# Patient Record
Sex: FEMALE | Race: WHITE | NOT HISPANIC OR LATINO | Employment: FULL TIME | URBAN - METROPOLITAN AREA
[De-identification: names, ages, dates, MRNs, and addresses within clinical notes are randomized per-mention and may not be internally consistent; named-entity substitution may affect disease eponyms.]

---

## 2017-01-27 ENCOUNTER — TELEPHONE (OUTPATIENT)
Dept: FAMILY MEDICINE | Facility: CLINIC | Age: 57
End: 2017-01-27

## 2017-01-27 DIAGNOSIS — F32.A DEPRESSION: Primary | ICD-10-CM

## 2017-01-27 NOTE — TELEPHONE ENCOUNTER
Patient called and states she is depressed and crying on phone.  States she will not harm herself and has no thought of suicide.  She would like rx for depression.  In the past she had celexa and it did not agree with her.  Talked with Dr. Cornell and she asked for a PHQ-9 and then start sertraline 50 mg and take one half tab po for one week and if doing ok increase to one tab daily and needs appointment to follow up.  Appt. Scheduled 2/3/17.  PHQ-9 score= 18

## 2017-01-28 ASSESSMENT — PATIENT HEALTH QUESTIONNAIRE - PHQ9: SUM OF ALL RESPONSES TO PHQ QUESTIONS 1-9: 18

## 2017-01-30 ENCOUNTER — TELEPHONE (OUTPATIENT)
Dept: FAMILY MEDICINE | Facility: CLINIC | Age: 57
End: 2017-01-30

## 2017-01-30 NOTE — TELEPHONE ENCOUNTER
"Patient calls reporting started sertraline 25mg 1/27. Felt poorly all day 1/28. Took again 1/28 pm and 1/29 pm. Today feeling poor - \"very hung over feeling\", headache, nausea, diarrhea. Requests alternative rx that is more mild. Hx body very sensitive to meds.   Sertraline was rx'd 1/27 via patient's phone request for med to help with depression symptoms and follow up visit scheduled for 2/3/17.   Plan: patient will RTC Wednesday 2/1 and stop med in meantime. She will call us sooner if any questions.  Alicia Brennan RN      "

## 2017-02-01 ENCOUNTER — OFFICE VISIT (OUTPATIENT)
Dept: FAMILY MEDICINE | Facility: CLINIC | Age: 57
End: 2017-02-01

## 2017-02-01 VITALS
DIASTOLIC BLOOD PRESSURE: 82 MMHG | WEIGHT: 293 LBS | HEART RATE: 52 BPM | TEMPERATURE: 98 F | SYSTOLIC BLOOD PRESSURE: 128 MMHG | OXYGEN SATURATION: 98 % | BODY MASS INDEX: 56.36 KG/M2

## 2017-02-01 DIAGNOSIS — E66.01 MORBID OBESITY DUE TO EXCESS CALORIES (H): ICD-10-CM

## 2017-02-01 DIAGNOSIS — R53.83 FATIGUE, UNSPECIFIED TYPE: ICD-10-CM

## 2017-02-01 DIAGNOSIS — R06.83 SNORING: ICD-10-CM

## 2017-02-01 DIAGNOSIS — H91.91 HEARING LOSS, RIGHT: ICD-10-CM

## 2017-02-01 DIAGNOSIS — F33.0 MAJOR DEPRESSIVE DISORDER, RECURRENT EPISODE, MILD (H): Primary | ICD-10-CM

## 2017-02-01 PROCEDURE — 36415 COLL VENOUS BLD VENIPUNCTURE: CPT | Performed by: FAMILY MEDICINE

## 2017-02-01 PROCEDURE — 99214 OFFICE O/P EST MOD 30 MIN: CPT | Performed by: FAMILY MEDICINE

## 2017-02-01 RX ORDER — DESVENLAFAXINE 50 MG/1
50 TABLET, FILM COATED, EXTENDED RELEASE ORAL DAILY
Qty: 30 TABLET | Refills: 1 | Status: SHIPPED | OUTPATIENT
Start: 2017-02-01 | End: 2017-03-23

## 2017-02-01 NOTE — PROGRESS NOTES
"Problem(s) Oriented visit        SUBJECTIVE:                                                    Donna Elmore is a 56 year old female who presents to clinic today for complaint of poor moods and exhaustion. She will commonly be awake for an hour in the middle of the night. She wakes up about 3-4 times nightly and gets up to urinate. She denies having trouble with excessive urination during the daytime hours. She does snore loudly. She is uncertain if this wakes her from sleep. Apparently her  is not aware of her having any apneic spells. She had a sleep study many years ago which was apparently normal. She tried taking sertraline and was \"completely zombie-like.\" She had diarrhea with starting the sertraline, which she stopped after only a few doses. She complains of being \"sore all over.\" She has a lot of stress with her mother who is being placed in a care facility, but has not yet made the transition. She gets support from her sister, but admits that she could benefit from speaking to a counselor.    She is concerned aout her risk for diabetes with her weight and family history, wondering how to test for this.      Problem list, Medication list, Allergies, and Medical/Social/Surgical histories reviewed in [facility] and updated as appropriate.   Additional history: as documented    ROS:  5 point ROS completed and negative except noted above, including Gen, CV, Resp, GI, MS      Histories:   Patient Active Problem List   Diagnosis     Uterine cancer (H)     SVT (supraventricular tachycardia) (H)     Major depressive disorder, recurrent episode, mild (H)     Appendicitis     Osteoarthritis of left knee     HTN (hypertension)     Obese     Past Surgical History   Procedure Laterality Date     Hysterectomy radical  3-2010     Catheter, ablation  1-2010     tachycardia ablation     D & c  3-2010     ------------other-------------  7-2011     cyst removal - back     Laparoscopic appendectomy  9/27/2013     " "Procedure: LAPAROSCOPIC APPENDECTOMY;  LAPAROSCOPIC APPENDECTOMY;  Surgeon: Noreen Brambila MD;  Location:  OR       Social History   Substance Use Topics     Smoking status: Never Smoker      Smokeless tobacco: Never Used     Alcohol Use: No     Family History   Problem Relation Age of Onset     HEART DISEASE Mother      pacemaker, heart failure     Thyroid Disease Mother      Hypertension Mother      CANCER Father      kidney     HEART DISEASE Father      Hypertension Father      Thyroid Disease Sister            OBJECTIVE:                                                    /82 mmHg  Pulse 52  Temp(Src) 98  F (36.7  C) (Oral)  Wt 158.305 kg (349 lb)  SpO2 98%  Body mass index is 56.36 kg/(m^2).   APPEARANCE: = Relaxed and in no distress  APPEARANCE: = morbidly obese  Recent/Remote Memory = Alert and Oriented x 3  Mood/Affect = Cooperative and interested   Labs Resulted Today:   Results for orders placed or performed in visit on 09/13/16   Giardia EIA O and P ([facility])   Result Value Ref Range    Ova + Parasite Exam Final report     Result 1 Comment     Giardia lamblia Ag, EIA Negative Negative    Narrative    Performed at:  [address] [place] [address], [place], [place]  [id number]  : GARRETT Mccarthy MD, Phone:  [phone]   Stool Culture ([employer])   Result Value Ref Range    Salmonella/Shigella Screen Final report     Result 1 Comment     Campylobacter Culture Final report     Result 1 Comment     E. Coli Shiga Toxin EIA Stool Negative Negative    Narrative    Performed at:  [facility] [address], [place], [place]  [id number]  : Paddy Mckeon MD, Phone:  [phone]     ASSESSMENT/PLAN:                                                    BMI:   Estimated body mass index is 56.36 kg/(m^2) as calculated from the following:    Height as of 11/5/15: 1.676 m (5' 6\").    Weight as of this encounter: 158.305 kg (349 lb).   Weight management plan: Patient referred to " endocrine and/or weight management specialty      Donna was seen today for depression and recheck medication.    Diagnoses and all orders for this visit:    Major depressive disorder, recurrent episode, mild (H)  -     desvenlafaxine succinate ER (PRISTIQ) 50 MG 24 hr tablet; Take 1 tablet (50 mg) by mouth daily  Poor tolerance of other SSRIs. Trial of Pristiq. F/U in 4 weeks. Names of counselors given.    Fatigue, unspecified type  Likely due to poor sleep. Recommend sleep study.     Morbid obesity due to excess calories (H)  -     Hemoglobin A1C ([facility])  -     VENOUS COLLECTION  -     Referral to [facility] To learn more about gastric bypass surgery.    Snoring  -     Sleep Study Referral; Future    Hearing loss, right  Refer for audiology evaluation.    35 minutes face to face time spent with patient, >50% in counseling.  There are no Patient Instructions on file for this visit.    The following health maintenance items are reviewed in Epic and correct as of today:  Health Maintenance   Topic Date Due     ADVANCE DIRECTIVE PLANNING Q5 YRS (NO INBASKET)  03/17/1979     HEPATITIS C SCREENING  03/17/1979     PAP SCREENING Q3 YR (SYSTEM ASSIGNED)  03/17/1982     COLON CANCER SCREEN (SYSTEM ASSIGNED)  03/17/2011     MAMMO SCREEN Q2 YR (SYSTEM ASSIGNED)  03/28/2013     INFLUENZA VACCINE (SYSTEM ASSIGNED)  09/01/2016     PHQ-9 Q6 MONTHS (NO INBASKET)  07/27/2017     TETANUS IMMUNIZATION (SYSTEM ASSIGNED)  03/07/2018     LIPID SCREEN Q5 YR FEMALE (SYSTEM ASSIGNED)  11/05/2020       Verito Cornell MD  [facility]  Family [facility]  [phone]    For any issues my office # is [phone]

## 2017-02-01 NOTE — MR AVS SNAPSHOT
After Visit Summary   2/1/2017    Donna Elmore    MRN: [id number]           Patient Information     Date Of Birth          1960        Visit Information        Provider Department      2/1/2017 4:15 PM Verito Cornell MD [facility]        Today's Diagnoses     Major depressive disorder, recurrent episode, mild (H)    -  1     Fatigue, unspecified type         Morbid obesity due to excess calories (H)         Snoring         Hearing loss, right            Follow-ups after your visit        Additional Services     Referral to [facility]       Referral to [facility]  Phone:  [phone]  Reason for Referral:  Bariatric surgery     Please be aware that coverage of these services is subject to the terms and limitations of your health insurance plan.  Call member services at your health plan with any benefit or coverage questions.            Sleep Study Referral       Please be aware that coverage of these services is subject to the terms and limitations of your health insurance plan.  Call member services at your health plan with any benefit or coverage questions.      Please bring the following to your appointment:    >>   List of current medications   >>   This referral request   >>   Any documents/labs given to you for this referral    [facility]  Ph [phone] (Age 15 and up)                  Future tests that were ordered for you today     Open Future Orders        Priority Expected Expires Ordered    Sleep Study Referral Routine  2/1/2018 2/1/2017            Who to contact     If you have questions or need follow up information about today's clinic visit or your schedule please contact [facility] directly at [phone].  Normal or non-critical lab and imaging results will be communicated to you by MyChart, letter or phone within 4 business days after the clinic has received the results. If you do  "not hear from us within 7 days, please contact the clinic through [employer] or phone. If you have a critical or abnormal lab result, we will notify you by phone as soon as possible.  Submit refill requests through [employer]bo or call your pharmacy and they will forward the refill request to us. Please allow 3 business days for your refill to be completed.          Additional Information About Your Visit        [employer] Information     Drobo lets you send messages to your doctor, view your test results, renew your prescriptions, schedule appointments and more. To sign up, go to [email or web address] . Click on \"Log in\" on the left side of the screen, which will take you to the Welcome page. Then click on \"Sign up Now\" on the right side of the page.     You will be asked to enter the access code listed below, as well as some personal information. Please follow the directions to create your username and password.     Your access code is: [email or web address]  Expires: 2017  5:26 PM     Your access code will  in 90 days. If you need help or a new code, please call your [facility] clinic or [phone].        Care EveryWhere ID     This is your Care EveryWhere ID. This could be used by other organizations to access your [facility] medical records  [id number]        Your Vitals Were     Pulse Temperature Pulse Oximetry             52 98  F (36.7  C) (Oral) 98%          Blood Pressure from Last 3 Encounters:   17 128/82   16 122/80   16 122/78    Weight from Last 3 Encounters:   17 158.305 kg (349 lb)   16 158.305 kg (349 lb)   16 163.749 kg (361 lb)              We Performed the Following     Hemoglobin A1C ([facility])     Referral to [facility]     VENOUS COLLECTION          Today's Medication Changes          These changes are accurate as of: 17  5:26 PM.  If you have any questions, ask your nurse or doctor.               Start taking these medicines.        " Dose/Directions    desvenlafaxine succinate ER 50 MG 24 hr tablet   Commonly known as:  PRISTIQ   Used for:  Major depressive disorder, recurrent episode, mild (H)   Started by:  Verito Cornell MD        Dose:  50 mg   Take 1 tablet (50 mg) by mouth daily   Quantity:  30 tablet   Refills:  1         Stop taking these medicines if you haven't already. Please contact your care team if you have questions.     sertraline 50 MG tablet   Commonly known as:  ZOLOFT   Stopped by:  Verito Cornell MD                Where to get your medicines      These medications were sent to [facility] [address] [facility] [address] AT [facility]  [address], [place] [address]     Phone:  [phone]    - desvenlafaxine succinate ER 50 MG 24 hr tablet             Primary Care Provider Office Phone # Fax #    Verito Cornell [phone]       [facility] [address]  [facility] [address]        Thank you!     Thank you for choosing [facility]  for your care. Our goal is always to provide you with excellent care. Hearing back from our patients is one way we can continue to improve our services. Please take a few minutes to complete the written survey that you may receive in the mail after your visit with us. Thank you!             Your Updated Medication List - Protect others around you: Learn how to safely use, store and throw away your medicines at www.disposemymeds.org.          This list is accurate as of: 2/1/17  5:26 PM.  Always use your most recent med list.                   Brand Name Dispense Instructions for use    ADVIL 200 MG tablet   Generic drug:  ibuprofen      Take 200 mg by mouth every 4 hours as needed for mild pain       ascorbic acid 1000 MG Tabs    vitamin C     Take 1,000 mg by mouth daily       desvenlafaxine succinate ER 50 MG 24 hr tablet    PRISTIQ    30 tablet    Take 1 tablet (50  mg) by mouth daily       glucosamine-chondroitin 500-400 MG Caps per capsule      Take 1 capsule by mouth 2 times daily       metoprolol 100 MG 24 hr tablet    TOPROL-XL    90 tablet    TAKE 1 TABLET BY MOUTH EVERY DAY       TUMS PO          VITAMIN D (CHOLECALCIFEROL) PO      Take 5,000 Units by mouth daily 1000units with Vitamin C 500mg - 3 tabs am & 2 tabs pm

## 2017-02-02 LAB — HBA1C MFR BLD: 6.5 % (ref 4.8–5.6)

## 2017-02-03 ENCOUNTER — TELEPHONE (OUTPATIENT)
Dept: FAMILY MEDICINE | Facility: CLINIC | Age: 57
End: 2017-02-03

## 2017-02-03 DIAGNOSIS — R73.09 OTHER ABNORMAL GLUCOSE: Primary | ICD-10-CM

## 2017-02-03 NOTE — TELEPHONE ENCOUNTER
You do indeed have mild diabetes.  There is a good chance that this can be diet controlled. Please make an appointment to meet with Linda our pharmacist, who will do diabetic teaching. I will see you back in 3-4 months to recheck hemoglobin A1c.  Patient informed & agrees & has scheduled with Linda  Future order made for A1C in 4 mo  Danielle Skinner MA February 3, 2017 3:02 PM

## 2017-02-03 NOTE — Clinical Note
[facility] [address] [place]  [address]  Phone: [phone]    February 3, 2017      Donna Elmore  [address]  [facility] [address]              Dear Donna,    The results from your recent visit showed You do indeed have mild diabetes.      There is a good chance that this can be diet controlled.   Please make an appointment to meet with Linda our pharmacist, who will do diabetic teaching.     I will see you back in 3-4 months to recheck hemoglobin A1c.        Sincerely,     Verito Cornell M.D.    Results for orders placed or performed in visit on 02/01/17   Hemoglobin A1C ([facility])   Result Value Ref Range    Hemoglobin A1C 6.5 (H) 4.8 - 5.6 %    Narrative    Performed at:  01 - [facility] [address], [place], [place]  [id number]  : Paddy Mckeon MD, Phone:  [phone]

## 2017-02-04 ASSESSMENT — PATIENT HEALTH QUESTIONNAIRE - PHQ9: SUM OF ALL RESPONSES TO PHQ QUESTIONS 1-9: 17

## 2017-02-20 ENCOUNTER — TELEPHONE (OUTPATIENT)
Dept: FAMILY MEDICINE | Facility: CLINIC | Age: 57
End: 2017-02-20

## 2017-03-23 ENCOUNTER — OFFICE VISIT (OUTPATIENT)
Dept: FAMILY MEDICINE | Facility: CLINIC | Age: 57
End: 2017-03-23

## 2017-03-23 VITALS
HEART RATE: 66 BPM | BODY MASS INDEX: 57.14 KG/M2 | SYSTOLIC BLOOD PRESSURE: 142 MMHG | TEMPERATURE: 98.6 F | DIASTOLIC BLOOD PRESSURE: 90 MMHG | OXYGEN SATURATION: 98 % | WEIGHT: 293 LBS

## 2017-03-23 DIAGNOSIS — L72.3 SEBACEOUS CYST: Primary | ICD-10-CM

## 2017-03-23 PROCEDURE — 99213 OFFICE O/P EST LOW 20 MIN: CPT | Performed by: FAMILY MEDICINE

## 2017-03-23 RX ORDER — SULFAMETHOXAZOLE/TRIMETHOPRIM 800-160 MG
1 TABLET ORAL 2 TIMES DAILY
Qty: 14 TABLET | Refills: 0 | Status: SHIPPED | OUTPATIENT
Start: 2017-03-23 | End: 2017-09-08

## 2017-03-23 NOTE — MR AVS SNAPSHOT
"              After Visit Summary   3/23/2017    oDnna Elmore    MRN: [id number]           Patient Information     Date Of Birth          1960        Visit Information        Provider Department      3/23/2017 2:15 PM Verito Cornell MD [facility]        Today's Diagnoses     Sebaceous cyst    -  1       Follow-ups after your visit        Your next 10 appointments already scheduled     Apr 07, 2017  3:00 PM CDT   Office Visit with Verito Cornell MD, RF PROCEDURE ROOM   [facility]    [address] [place] [address] [phone]           Bring a current list of meds and any records pertaining to this visit.  For Physicals, please bring immunization records and any forms needing to be filled out.  Please arrive 10 minutes early to complete paperwork.              Who to contact     If you have questions or need follow up information about today's clinic visit or your schedule please contact [facility] directly at [phone].  Normal or non-critical lab and imaging results will be communicated to you by AiMeiWe[employer]art, letter or phone within 4 business days after the clinic has received the results. If you do not hear from us within 7 days, please contact the clinic through [employer] or phone. If you have a critical or abnormal lab result, we will notify you by phone as soon as possible.  Submit refill requests through [employer] or call your pharmacy and they will forward the refill request to us. Please allow 3 business days for your refill to be completed.          Additional Information About Your Visit        Zero2IPO Information     Zero2[employer]O lets you send messages to your doctor, view your test results, renew your prescriptions, schedule appointments and more. To sign up, go to [email or web address] . Click on \"Log in\" on the left side of the screen, which will take you to the Welcome page. Then click on \"Sign up Now\" " on the right side of the page.     You will be asked to enter the access code listed below, as well as some personal information. Please follow the directions to create your username and password.     Your access code is: [email or web address]  Expires: 2017  6:26 PM     Your access code will  in 90 days. If you need help or a new code, please call your [facility] or [phone].        Care EveryWhere ID     This is your Care EveryWhere ID. This could be used by other organizations to access your [facility] medical records  [id number]        Your Vitals Were     Pulse Temperature Pulse Oximetry BMI (Body Mass Index)          66 98.6  F (37  C) 98% 57.14 kg/m2         Blood Pressure from Last 3 Encounters:   17 142/90   17 128/82   16 122/80    Weight from Last 3 Encounters:   17 (!) 160.6 kg (354 lb)   17 (!) 158.3 kg (349 lb)   16 (!) 158.3 kg (349 lb)              Today, you had the following     No orders found for display         Today's Medication Changes          These changes are accurate as of: 3/23/17 11:59 PM.  If you have any questions, ask your nurse or doctor.               Start taking these medicines.        Dose/Directions    sulfamethoxazole-trimethoprim 800-160 MG per tablet   Commonly known as:  BACTRIM DS/SEPTRA DS   Used for:  Sebaceous cyst   Started by:  Verito Cornell MD        Dose:  1 tablet   Take 1 tablet by mouth 2 times daily   Quantity:  14 tablet   Refills:  0            Where to get your medicines      These medications were sent to [facility] [address] [place], [place] - [address] AT [facility] [place]  [address], [facility] [address]     Phone:  [phone]     sulfamethoxazole-trimethoprim 800-160 MG per tablet                Primary Care Provider Office Phone # Fax #    Verito Cornell [phone]       [place] [address]  [facility]  [address]        Thank you!     Thank you for choosing [facility]  for your care. Our goal is always to provide you with excellent care. Hearing back from our patients is one way we can continue to improve our services. Please take a few minutes to complete the written survey that you may receive in the mail after your visit with us. Thank you!             Your Updated Medication List - Protect others around you: Learn how to safely use, store and throw away your medicines at www.disposemymeds.org.          This list is accurate as of: 3/23/17 11:59 PM.  Always use your most recent med list.                   Brand Name Dispense Instructions for use    ADVIL 200 MG tablet   Generic drug:  ibuprofen      Take 200 mg by mouth every 4 hours as needed for mild pain       ascorbic acid 1000 MG Tabs    vitamin C     Take 1,000 mg by mouth daily       glucosamine-chondroitin 500-400 MG Caps per capsule      Take 1 capsule by mouth 2 times daily       metoprolol 100 MG 24 hr tablet    TOPROL-XL    90 tablet    TAKE 1 TABLET BY MOUTH EVERY DAY       sulfamethoxazole-trimethoprim 800-160 MG per tablet    BACTRIM DS/SEPTRA DS    14 tablet    Take 1 tablet by mouth 2 times daily       TUMS PO          VITAMIN D (CHOLECALCIFEROL) PO      Take 5,000 Units by mouth daily 1000units with Vitamin C 500mg - 3 tabs am & 2 tabs pm

## 2017-03-23 NOTE — PROGRESS NOTES
Problem(s) Oriented visit        SUBJECTIVE:                                                    Donna Elmore is a 57 year old female who presents to clinic today for sore on her back. It has been hurting for the past few days, especially if she leans against it. She has not done any hot-packing or allowed anyone to squeeze it.       Problem list, Medication list, Allergies, and Medical/Social/Surgical histories reviewed in EPIC and updated as appropriate.   Additional history: as documented    ROS:  5 point ROS completed and negative except noted above, including Gen, CV, Resp, GI, MS      Histories:   Patient Active Problem List   Diagnosis     Uterine cancer (H)     SVT (supraventricular tachycardia) (H)     Major depressive disorder, recurrent episode, mild (H)     Appendicitis     Osteoarthritis of left knee     HTN (hypertension)     Obese     Past Surgical History:   Procedure Laterality Date     ------------OTHER-------------  7-2011    cyst removal - back     CATHETER, ABLATION  1-2010    tachycardia ablation     D & C  3-2010     HYSTERECTOMY RADICAL  3-2010     LAPAROSCOPIC APPENDECTOMY  9/27/2013    Procedure: LAPAROSCOPIC APPENDECTOMY;  LAPAROSCOPIC APPENDECTOMY;  Surgeon: Noreen Brambila MD;  Location:  OR       Social History   Substance Use Topics     Smoking status: Never Smoker     Smokeless tobacco: Never Used     Alcohol use No     Family History   Problem Relation Age of Onset     HEART DISEASE Mother      pacemaker, heart failure     Thyroid Disease Mother      Hypertension Mother      CANCER Father      kidney     HEART DISEASE Father      Hypertension Father      Thyroid Disease Sister            OBJECTIVE:                                                    /90 (BP Location: Left arm)  Pulse 66  Temp 98.6  F (37  C)  Wt (!) 160.6 kg (354 lb)  SpO2 98%  BMI 57.14 kg/m2  Body mass index is 57.14 kg/(m^2).   APPEARANCE: = Relaxed and in no distress  SKIN: midline thoracic back  with scar from previous excision and cystic feeling lesion at the same area measuring 2 cm in diameter with mild warmth and mild fluctuance  Recent/Remote Memory = Alert and Oriented x 3  Mood/Affect = Cooperative and interested   Labs Resulted Today:   Results for orders placed or performed in visit on 02/01/17   Hemoglobin A1C ([facility])   Result Value Ref Range    Hemoglobin A1C 6.5 (H) 4.8 - 5.6 %    Narrative    Performed at:  01 - [facility] [address], [place], [place]  [id number]  : Paddy Mckoen MD, Phone:  [phone]     ASSESSMENT/PLAN:                                                        Donna was seen today for derm problem and uri.    Diagnoses and all orders for this visit:    Sebaceous cyst  -     sulfamethoxazole-trimethoprim (BACTRIM DS/SEPTRA DS) 800-160 MG per tablet; Take 1 tablet by mouth 2 times daily  Recommend hot pack, then return for excision when it cools off.    There are no Patient Instructions on file for this visit.    The following health maintenance items are reviewed in Epic and correct as of today:  Health Maintenance   Topic Date Due     ADVANCE DIRECTIVE PLANNING Q5 YRS (NO INBASKET)  03/17/1979     HEPATITIS C SCREENING  03/17/1979     PAP SCREENING Q3 YR (SYSTEM ASSIGNED)  03/17/1982     COLON CANCER SCREEN (SYSTEM ASSIGNED)  03/17/2011     MAMMO SCREEN Q2 YR (SYSTEM ASSIGNED)  03/28/2013     PHQ-9 Q6 MONTHS (NO INBASKET)  08/01/2017     INFLUENZA VACCINE (SYSTEM ASSIGNED)  09/01/2017     TETANUS IMMUNIZATION (SYSTEM ASSIGNED)  03/07/2018     LIPID SCREEN Q5 YR FEMALE (SYSTEM ASSIGNED)  11/05/2020       Verito Cornell MD  [facility]  Family Practice  [facility]  [phone]    For any issues my office # is [phone]

## 2017-03-24 ENCOUNTER — TELEPHONE (OUTPATIENT)
Dept: FAMILY MEDICINE | Facility: CLINIC | Age: 57
End: 2017-03-24

## 2017-03-24 DIAGNOSIS — R05.9 COUGH: Primary | ICD-10-CM

## 2017-03-24 RX ORDER — ALBUTEROL SULFATE 0.83 MG/ML
1 SOLUTION RESPIRATORY (INHALATION) EVERY 6 HOURS PRN
Qty: 25 VIAL | Refills: 0 | Status: SHIPPED | OUTPATIENT
Start: 2017-03-24 | End: 2019-05-24

## 2017-03-24 NOTE — TELEPHONE ENCOUNTER
Patient called requesting refill on albuterol nebulizer.  She denies any SOB or difficulty breathing.  Per Dr. Kraig MONIQUE for refill, patient told that if SOB or difficulty breathing develops that she is to be seen either in clinic or urgent care.  Laura Medina

## 2017-04-04 ENCOUNTER — TELEPHONE (OUTPATIENT)
Dept: FAMILY MEDICINE | Facility: CLINIC | Age: 57
End: 2017-04-04

## 2017-04-04 DIAGNOSIS — B37.31 YEAST INFECTION OF THE VAGINA: Primary | ICD-10-CM

## 2017-04-04 RX ORDER — FLUCONAZOLE 150 MG/1
TABLET ORAL
Qty: 2 TABLET | Refills: 0 | Status: SHIPPED | OUTPATIENT
Start: 2017-04-04 | End: 2017-09-08

## 2017-04-05 NOTE — TELEPHONE ENCOUNTER
Patient calls reporting vaginal itching x 3 days. Denies any other vaginal symptoms. On Septra DS x 10 days prescribed 3/23/17 for pneumonia. Hx yeast infections with antibiotics - resolve with fluconazole.   Plan: ok per Dr. Lynch (oncall for Dr. Cornell) for fluconazole 150mg #2 tabs sig one now and may repeat in 3-5 days prn. RTC if symptoms worsen or persist. Patient agrees.  Alicia Brennan RN

## 2017-04-07 ENCOUNTER — OFFICE VISIT (OUTPATIENT)
Dept: FAMILY MEDICINE | Facility: CLINIC | Age: 57
End: 2017-04-07

## 2017-04-07 VITALS — HEART RATE: 63 BPM | OXYGEN SATURATION: 97 % | DIASTOLIC BLOOD PRESSURE: 62 MMHG | SYSTOLIC BLOOD PRESSURE: 132 MMHG

## 2017-04-07 DIAGNOSIS — J18.9 COMMUNITY ACQUIRED PNEUMONIA: Primary | ICD-10-CM

## 2017-04-07 PROCEDURE — 71020 XR CHEST 2 VW: CPT | Performed by: FAMILY MEDICINE

## 2017-04-07 PROCEDURE — 99213 OFFICE O/P EST LOW 20 MIN: CPT | Performed by: FAMILY MEDICINE

## 2017-04-07 RX ORDER — LEVOFLOXACIN 750 MG/1
750 TABLET, FILM COATED ORAL DAILY
Qty: 7 TABLET | Refills: 0 | Status: SHIPPED | OUTPATIENT
Start: 2017-04-07 | End: 2017-09-08

## 2017-04-07 NOTE — MR AVS SNAPSHOT
"              After Visit Summary   4/7/2017    Donna Elmore    MRN: [id number]           Patient Information     Date Of Birth          1960        Visit Information        Provider Department      4/7/2017 3:15 PM Verito Cornell MD [facility]        Today's Diagnoses     Community acquired pneumonia    -  1       Follow-ups after your visit        Your next 10 appointments already scheduled     Apr 20, 2017  2:00 PM CDT   New Sleep Patient with Miguel Kiran MD   [facility] ([facility])    [address] [place] [address] [id number] [phone]              Who to contact     If you have questions or need follow up information about today's clinic visit or your schedule please contact [facility] directly at [phone].  Normal or non-critical lab and imaging results will be communicated to you by "[employer]hart, letter or phone within 4 business days after the clinic has received the results. If you do not hear from us within 7 days, please contact the clinic through "[employer]hart or phone. If you have a critical or abnormal lab result, we will notify you by phone as soon as possible.  Submit refill requests through [employer] or call your pharmacy and they will forward the refill request to us. Please allow 3 business days for your refill to be completed.          Additional Information About Your Visit        MyChart Information     [employer] lets you send messages to your doctor, view your test results, renew your prescriptions, schedule appointments and more. To sign up, go to [email or web address] . Click on \"Log in\" on the left side of the screen, which will take you to the Welcome page. Then click on \"Sign up Now\" on the right side of the page.     You will be asked to enter the access code listed below, as well as some personal information. Please follow the directions to create your username and password.   "   Your access code is: [email or web address]  Expires: 2017  6:26 PM     Your access code will  in 90 days. If you need help or a new code, please call your [facility] or [phone].        Care EveryWhere ID     This is your Care EveryWhere ID. This could be used by other organizations to access your [place] medical records  [id number]        Your Vitals Were     Pulse Pulse Oximetry                63 97%           Blood Pressure from Last 3 Encounters:   17 132/62   17 142/90   17 128/82    Weight from Last 3 Encounters:   17 (!) 160.6 kg (354 lb)   17 (!) 158.3 kg (349 lb)   16 (!) 158.3 kg (349 lb)              We Performed the Following     X-ray Chest 2 vws*          Today's Medication Changes          These changes are accurate as of: 17  3:47 PM.  If you have any questions, ask your nurse or doctor.               Start taking these medicines.        Dose/Directions    levofloxacin 750 MG tablet   Commonly known as:  LEVAQUIN   Used for:  Community acquired pneumonia   Started by:  Verito Cornell MD        Dose:  750 mg   Take 1 tablet (750 mg) by mouth daily   Quantity:  7 tablet   Refills:  0            Where to get your medicines      These medications were sent to [facility] [id number] [place], [place] [address] AT [facility] [address] [place]  [address], [facility] [address]     Phone:  [phone]     levofloxacin 750 MG tablet                Primary Care Provider Office Phone # Fax #    Verito Cornell [phone]       [facility] [address]  [facility] [address]        Thank you!     Thank you for choosing [facility]  for your care. Our goal is always to provide you with excellent care. Hearing back from our patients is one way we can continue to improve our services. Please take a few minutes to complete the written survey that you may receive in the  mail after your visit with us. Thank you!             Your Updated Medication List - Protect others around you: Learn how to safely use, store and throw away your medicines at www.disposemymeds.org.          This list is accurate as of: 4/7/17  3:47 PM.  Always use your most recent med list.                   Brand Name Dispense Instructions for use    ADVIL 200 MG tablet   Generic drug:  ibuprofen      Take 200 mg by mouth every 4 hours as needed for mild pain       albuterol (2.5 MG/3ML) 0.083% neb solution     25 vial    Take 1 vial (2.5 mg) by nebulization every 6 hours as needed       ascorbic acid 1000 MG Tabs    vitamin C     Take 1,000 mg by mouth daily       fluconazole 150 MG tablet    DIFLUCAN    2 tablet    Take one tab by mouth now and may repeat in 3-5 days if needed.       glucosamine-chondroitin 500-400 MG Caps per capsule      Take 1 capsule by mouth 2 times daily       levofloxacin 750 MG tablet    LEVAQUIN    7 tablet    Take 1 tablet (750 mg) by mouth daily       metoprolol 100 MG 24 hr tablet    TOPROL-XL    90 tablet    TAKE 1 TABLET BY MOUTH EVERY DAY       sulfamethoxazole-trimethoprim 800-160 MG per tablet    BACTRIM DS/SEPTRA DS    14 tablet    Take 1 tablet by mouth 2 times daily       TUMS PO          VITAMIN D (CHOLECALCIFEROL) PO      Take 5,000 Units by mouth daily 1000units with Vitamin C 500mg - 3 tabs am & 2 tabs pm

## 2017-04-07 NOTE — PROGRESS NOTES
Problem(s) Oriented visit        SUBJECTIVE:                                                    Donna Elmore is a 57 year old female who presents to clinic today for cough. She has been on antibiotic for pneumonia for about 8 days. She was seen at Urgent Care in [place] and chest xray was done. She also had influenza and took Tamiflu. She initially felt better but now is feeling the cough worsen, more fatigue, sleeping poorly. No fever. She feels tight in the chest.       Problem list, Medication list, Allergies, and Medical/Social/Surgical histories reviewed in [facility] and updated as appropriate.   Additional history: as documented    ROS:  5 point ROS completed and negative except noted above, including Gen, CV, Resp, GI, MS      Histories:   Patient Active Problem List   Diagnosis     Uterine cancer (H)     SVT (supraventricular tachycardia) (H)     Major depressive disorder, recurrent episode, mild (H)     Appendicitis     Osteoarthritis of left knee     HTN (hypertension)     Obese     Past Surgical History:   Procedure Laterality Date     ------------OTHER-------------  7-2011    cyst removal - back     CATHETER, ABLATION  1-2010    tachycardia ablation     D & C  3-2010     HYSTERECTOMY RADICAL  3-2010     LAPAROSCOPIC APPENDECTOMY  9/27/2013    Procedure: LAPAROSCOPIC APPENDECTOMY;  LAPAROSCOPIC APPENDECTOMY;  Surgeon: Noreen Brambila MD;  Location:  OR       Social History   Substance Use Topics     Smoking status: Never Smoker     Smokeless tobacco: Never Used     Alcohol use No     Family History   Problem Relation Age of Onset     HEART DISEASE Mother      pacemaker, heart failure     Thyroid Disease Mother      Hypertension Mother      CANCER Father      kidney     HEART DISEASE Father      Hypertension Father      Thyroid Disease Sister            OBJECTIVE:                                                    /62  Pulse 63  SpO2 97%  There is no height or weight on file to calculate BMI.    APPEARANCE: = Relaxed and in no distress  Conj/Eyelids = noninjected and lids and lashes are without inflammation  PERRLA/Irises = Pupils Round Reactive to Light and Irisis without inflammation  Ears/Nose = External structures and Nares have usual shape and form  Ear canals and TM's = Canals are not inflammed and have none or little wax and the drums are not injected and have a light reflex   Lips/Teeth/Gums = No lesions seen, good dentition, and gums seem healthy  Oropharynx = No leukoplakia, No injection to the tissues, Normal Uvula  Neck = No anterior or posterior adenopathy appreciated.  Resp effort = Calm regular breathing  Breath Sounds =  Musical rhonchi in the right upper lung field. No appreciable wheeze.  Mood/Affect = Cooperative and interested  Chest xray with might middle lobe infiltrate   Labs Resulted Today:   Results for orders placed or performed in visit on 02/01/17   Hemoglobin A1C ([facility])   Result Value Ref Range    Hemoglobin A1C 6.5 (H) 4.8 - 5.6 %    Narrative    Performed at:  01 - [facility] [address], [place], [place]  [id number]  : Paddy Mckeon MD, Phone:  [phone]     ASSESSMENT/PLAN:                                                        Donna was seen today for other.    Diagnoses and all orders for this visit:    Community acquired pneumonia  -     X-ray Chest 2 vws*  Stop cefdinir, start Levaquin. Follow up if failure to improve.        The following health maintenance items are reviewed in Epic and correct as of today:  Health Maintenance   Topic Date Due     ADVANCE DIRECTIVE PLANNING Q5 YRS (NO INBASKET)  03/17/1979     HEPATITIS C SCREENING  03/17/1979     PAP SCREENING Q3 YR (SYSTEM ASSIGNED)  03/17/1982     COLON CANCER SCREEN (SYSTEM ASSIGNED)  03/17/2011     MAMMO SCREEN Q2 YR (SYSTEM ASSIGNED)  03/28/2013     PHQ-9 Q6 MONTHS (NO INBASKET)  08/01/2017     INFLUENZA VACCINE (SYSTEM ASSIGNED)  09/01/2017     TETANUS IMMUNIZATION (SYSTEM ASSIGNED)   03/07/2018     LIPID SCREEN Q5 YR FEMALE (SYSTEM ASSIGNED)  11/05/2020       Verito Cornell MD  [facility]  [phone]    For any issues my office # is [phone]

## 2017-04-12 ENCOUNTER — TELEPHONE (OUTPATIENT)
Dept: FAMILY MEDICINE | Facility: CLINIC | Age: 57
End: 2017-04-12

## 2017-04-12 NOTE — TELEPHONE ENCOUNTER
----- Message from Verito Cornell MD sent at 4/10/2017  5:18 PM CDT -----  Need to repeat xray in 2-3 weeks to make sure pneumonia went away.

## 2017-04-27 ENCOUNTER — TRANSFERRED RECORDS (OUTPATIENT)
Dept: FAMILY MEDICINE | Facility: CLINIC | Age: 57
End: 2017-04-27

## 2017-04-27 ENCOUNTER — OFFICE VISIT (OUTPATIENT)
Dept: FAMILY MEDICINE | Facility: CLINIC | Age: 57
End: 2017-04-27

## 2017-04-27 VITALS
HEART RATE: 60 BPM | DIASTOLIC BLOOD PRESSURE: 80 MMHG | BODY MASS INDEX: 57.3 KG/M2 | OXYGEN SATURATION: 99 % | WEIGHT: 293 LBS | SYSTOLIC BLOOD PRESSURE: 148 MMHG

## 2017-04-27 DIAGNOSIS — J18.9 PNEUMONIA OF RIGHT MIDDLE LOBE DUE TO INFECTIOUS ORGANISM: Primary | ICD-10-CM

## 2017-04-27 DIAGNOSIS — H91.91 HEARING LOSS, RIGHT: ICD-10-CM

## 2017-04-27 DIAGNOSIS — R91.8 OPACITY OF LUNG ON IMAGING STUDY: ICD-10-CM

## 2017-04-27 PROCEDURE — 71020 XR CHEST 2 VW: CPT | Performed by: FAMILY MEDICINE

## 2017-04-27 PROCEDURE — 99214 OFFICE O/P EST MOD 30 MIN: CPT | Performed by: FAMILY MEDICINE

## 2017-04-27 NOTE — MR AVS SNAPSHOT
After Visit Summary   4/27/2017    Donna Elmore    MRN: [id number]           Patient Information     Date Of Birth          1960        Visit Information        Provider Department      4/27/2017 8:30 AM Verito Cornell MD [facility]        Today's Diagnoses     Pneumonia of right middle lobe due to infectious organism    -  1    Hearing loss, right        Opacity of lung on imaging study           Follow-ups after your visit        Additional Services     Referral to Audiology - [facility]       Your provider has referred you to the audiology department here at [facility].    Treatment:  Evaluation & Treatment   Specialty Testing:  Hearing Aid Consultation    Please be aware that coverage of these services is subject to the terms and limitations of your health insurance plan.  Call member services at your health plan with any benefit or coverage questions.      Please bring the following to your appointment:  >>   Any x-rays, CTs or MRIs which have been performed.  Contact the facility where they were done to arrange for  prior to your scheduled appointment.  Any new CT, MRI or other procedures ordered by your specialist must be performed at a [facility] facility or coordinated by your clinic's   referral office.   >>   List of current medications  >>   This referral request   >>   Any documents/labs given to you for this referral            [facility]       [facility]  Phone: [phone]  Fax: [phone]  Reason for referral: CT CHEST WITH CONTRAST                  Your next 10 appointments already scheduled     May 26, 2017  8:00 AM CDT   New Sleep Patient with Miguel Kiran MD   [facility] ([facility])    [address] [place] [address] [phone]              Who to contact     If you have questions or need follow up  "information about today's clinic visit or your schedule please contact [facility] directly at [phone].  Normal or non-critical lab and imaging results will be communicated to you by [employer], letter or phone within 4 business days after the clinic has received the results. If you do not hear from us within 7 days, please contact the clinic through [employer] or phone. If you have a critical or abnormal lab result, we will notify you by phone as soon as possible.  Submit refill requests through [employer] or call your pharmacy and they will forward the refill request to us. Please allow 3 business days for your refill to be completed.          Additional Information About Your Visit        MyChart Information     [employer] lets you send messages to your doctor, view your test results, renew your prescriptions, schedule appointments and more. To sign up, go to [email or web address] . Click on \"Log in\" on the left side of the screen, which will take you to the Welcome page. Then click on \"Sign up Now\" on the right side of the page.     You will be asked to enter the access code listed below, as well as some personal information. Please follow the directions to create your username and password.     Your access code is: [email or web address]  Expires: 2017  6:26 PM     Your access code will  in 90 days. If you need help or a new code, please call your [place] clinic or [phone].        Care EveryWhere ID     This is your Care EveryWhere ID. This could be used by other organizations to access your [place] medical records  [id number]        Your Vitals Were     Pulse Pulse Oximetry BMI (Body Mass Index)             60 99% 57.3 kg/m2          Blood Pressure from Last 3 Encounters:   17 148/80   17 132/62   17 142/90    Weight from Last 3 Encounters:   17 (!) 161 kg (355 lb)   17 (!) 160.6 kg (354 lb)   17 (!) 158.3 kg (349 lb)              We Performed the Following  "    Referral to Audiology - [facility]     Referral to [facility]     X-ray Chest 2 vws*        Primary Care Provider Office Phone # Fax #    Verito Elvie Cornell [phone]       [facility] [address]  [facility] [address]        Thank you!     Thank you for choosing [facility]  for your care. Our goal is always to provide you with excellent care. Hearing back from our patients is one way we can continue to improve our services. Please take a few minutes to complete the written survey that you may receive in the mail after your visit with us. Thank you!             Your Updated Medication List - Protect others around you: Learn how to safely use, store and throw away your medicines at www.disposemymeds.org.          This list is accurate as of: 4/27/17 11:56 AM.  Always use your most recent med list.                   Brand Name Dispense Instructions for use    ADVIL 200 MG tablet   Generic drug:  ibuprofen      Take 200 mg by mouth every 4 hours as needed for mild pain       albuterol (2.5 MG/3ML) 0.083% neb solution     25 vial    Take 1 vial (2.5 mg) by nebulization every 6 hours as needed       ascorbic acid 1000 MG Tabs    vitamin C     Take 1,000 mg by mouth daily       fluconazole 150 MG tablet    DIFLUCAN    2 tablet    Take one tab by mouth now and may repeat in 3-5 days if needed.       glucosamine-chondroitin 500-400 MG Caps per capsule      Take 1 capsule by mouth 2 times daily       levofloxacin 750 MG tablet    LEVAQUIN    7 tablet    Take 1 tablet (750 mg) by mouth daily       metoprolol 100 MG 24 hr tablet    TOPROL-XL    90 tablet    TAKE 1 TABLET BY MOUTH EVERY DAY       PROBIOTIC DAILY PO          sulfamethoxazole-trimethoprim 800-160 MG per tablet    BACTRIM DS/SEPTRA DS    14 tablet    Take 1 tablet by mouth 2 times daily       TUMS PO          VITAMIN D (CHOLECALCIFEROL) PO      Take 5,000 Units by mouth daily 1000units with  Vitamin C 500mg - 3 tabs am & 2 tabs pm

## 2017-04-27 NOTE — PROGRESS NOTES
Problem(s) Oriented visit        SUBJECTIVE:                                                    Donna Elmore is a 57 year old female who presents to clinic today for follow up on pneumonia. She had influenza and pneumonia. She had chest xray that showed a 3 cm right mid lung airspace consolidation and is here for chest xray to verify resolution. No fever. Cough is almost entirely resolved. Her energy level is good. She has never been a smoker. History of frequent pneumonias as a child.      Problem list, Medication list, Allergies, and Medical/Social/Surgical histories reviewed in [facility] and updated as appropriate.   Additional history: as documented    ROS:  5 point ROS completed and negative except noted above, including Gen, CV, Resp, GI, MS  She complains of right ear hearing loss.    Histories:   Patient Active Problem List   Diagnosis     Uterine cancer (H)     SVT (supraventricular tachycardia) (H)     Major depressive disorder, recurrent episode, mild (H)     Appendicitis     Osteoarthritis of left knee     HTN (hypertension)     Obese     Past Surgical History:   Procedure Laterality Date     ------------OTHER-------------  7-2011    cyst removal - back     CATHETER, ABLATION  1-2010    tachycardia ablation     D & C  3-2010     HYSTERECTOMY RADICAL  3-2010     LAPAROSCOPIC APPENDECTOMY  9/27/2013    Procedure: LAPAROSCOPIC APPENDECTOMY;  LAPAROSCOPIC APPENDECTOMY;  Surgeon: Noreen Brambila MD;  Location:  OR       Social History   Substance Use Topics     Smoking status: Never Smoker     Smokeless tobacco: Never Used     Alcohol use No     Family History   Problem Relation Age of Onset     HEART DISEASE Mother      pacemaker, heart failure     Thyroid Disease Mother      Hypertension Mother      CANCER Father      kidney     HEART DISEASE Father      Hypertension Father      Thyroid Disease Sister            OBJECTIVE:                                                    /80  Pulse 60  Wt (!)  161 kg (355 lb)  SpO2 99%  BMI 57.3 kg/m2  Body mass index is 57.3 kg/(m^2).   GENERAL APPEARANCE: Alert, no acute distress  EYES: PERRL, EOM normal, conjunctiva and lids normal  HENT: Ears and TMs normal, oral mucosa and posterior oropharynx normal  NECK: No adenopathy,masses or thyromegaly  RESP: lungs clear to auscultation   CV: normal rate, regular rhythm, no murmur or gallop  NEURO: Alert, oriented, speech and mentation normal  PSYCH: mentation appears normal, affect and mood normal   Labs Resulted Today:   Results for orders placed or performed in visit on 02/01/17   Hemoglobin A1C ([facility])   Result Value Ref Range    Hemoglobin A1C 6.5 (H) 4.8 - 5.6 %    Narrative    Performed at:  01 - [facility] [address], [place], [place]  [id number]  : Paddy Mckeon MD, Phone:  [phone]     ASSESSMENT/PLAN:                                                        Donna was seen today for recheck.    Diagnoses and all orders for this visit:    Pneumonia of right middle lobe due to infectious organism/Opacity of lung on imaging study  -     X-ray Chest 2 vws*  -     [facility] to determine etiology of lesion.    Hearing loss, right  -     Referral to Audiology - [facility]    Per verbal report from radiologist, right lung lesion appears to be clusters of calcified granuloma. This was reported to the patient. Per recommendation of the radiologist, she will follow up in a year for chest CT to verify stability.               The following health maintenance items are reviewed in Epic and correct as of today:  Health Maintenance   Topic Date Due     ADVANCE DIRECTIVE PLANNING Q5 YRS (NO INBASKET)  03/17/1979     HEPATITIS C SCREENING  03/17/1979     PAP SCREENING Q3 YR (SYSTEM ASSIGNED)  03/17/1982     COLON CANCER SCREEN (SYSTEM ASSIGNED)  03/17/2011     MAMMO SCREEN Q2 YR (SYSTEM ASSIGNED)  03/28/2013     PHQ-9 Q6 MONTHS (NO INBASKET)  08/01/2017     INFLUENZA  VACCINE (SYSTEM ASSIGNED)  09/01/2017     TETANUS IMMUNIZATION (SYSTEM ASSIGNED)  03/07/2018     LIPID SCREEN Q5 YR FEMALE (SYSTEM ASSIGNED)  11/05/2020       Verito Cornell MD  [facility]  [phone]    For any issues my office # is [phone]

## 2017-07-01 ENCOUNTER — HEALTH MAINTENANCE LETTER (OUTPATIENT)
Age: 57
End: 2017-07-01

## 2017-08-30 ENCOUNTER — TELEPHONE (OUTPATIENT)
Dept: FAMILY MEDICINE | Facility: CLINIC | Age: 57
End: 2017-08-30

## 2017-08-30 ASSESSMENT — PATIENT HEALTH QUESTIONNAIRE - PHQ9: SUM OF ALL RESPONSES TO PHQ QUESTIONS 1-9: 9

## 2017-08-30 NOTE — TELEPHONE ENCOUNTER
Spoke with patient to update PHQ-9.  She will call to schedule depression f/u within next few months.  Laura Medina

## 2017-09-08 ENCOUNTER — OFFICE VISIT (OUTPATIENT)
Dept: FAMILY MEDICINE | Facility: CLINIC | Age: 57
End: 2017-09-08

## 2017-09-08 VITALS
HEART RATE: 56 BPM | BODY MASS INDEX: 56.23 KG/M2 | RESPIRATION RATE: 20 BRPM | TEMPERATURE: 97 F | WEIGHT: 293 LBS | OXYGEN SATURATION: 98 %

## 2017-09-08 DIAGNOSIS — J20.9 ACUTE BRONCHITIS, UNSPECIFIED ORGANISM: ICD-10-CM

## 2017-09-08 DIAGNOSIS — E66.01 MORBID OBESITY DUE TO EXCESS CALORIES (H): Primary | ICD-10-CM

## 2017-09-08 PROCEDURE — 99214 OFFICE O/P EST MOD 30 MIN: CPT | Performed by: FAMILY MEDICINE

## 2017-09-08 RX ORDER — AZITHROMYCIN 250 MG/1
TABLET, FILM COATED ORAL
Qty: 6 TABLET | Refills: 0 | Status: SHIPPED | OUTPATIENT
Start: 2017-09-08 | End: 2018-08-27

## 2017-09-08 NOTE — PATIENT INSTRUCTIONS
Wheat Belly: Lose the Wheat, Lose the Weight, and Find Your Path Back to Health by Johnson Brennan (Aug 30, 2011)

## 2017-09-08 NOTE — MR AVS SNAPSHOT
"              After Visit Summary   9/8/2017    Donna Elmore    MRN: [id number]           Patient Information     Date Of Birth          1960        Visit Information        Provider Department      9/8/2017 10:15 AM Dennis Narayanan MD [facility]        Today's Diagnoses     Morbid obesity due to excess calories (H)    -  1    Acute bronchitis, unspecified organism          Care Instructions      Wheat Belly: Lose the Wheat, Lose the Weight, and Find Your Path Back to Health by Johnson Brennan (Aug 30, 2011)            Follow-ups after your visit        Who to contact     If you have questions or need follow up information about today's clinic visit or your schedule please contact [facility] directly at [phone].  Normal or non-critical lab and imaging results will be communicated to you by LISNRhart, letter or phone within 4 business days after the clinic has received the results. If you do not hear from us within 7 days, please contact the clinic through LISNRhart or phone. If you have a critical or abnormal lab result, we will notify you by phone as soon as possible.  Submit refill requests through [employer] or call your pharmacy and they will forward the refill request to us. Please allow 3 business days for your refill to be completed.          Additional Information About Your Visit        MyChart Information     [employer] lets you send messages to your doctor, view your test results, renew your prescriptions, schedule appointments and more. To sign up, go to [email or web address] . Click on \"Log in\" on the left side of the screen, which will take you to the Welcome page. Then click on \"Sign up Now\" on the right side of the page.     You will be asked to enter the access code listed below, as well as some personal information. Please follow the directions to create your username and password.     Your access code is: [email or web address]  Expires: 12/7/2017 10:57 AM     Your access " code will  in 90 days. If you need help or a new code, please call your [facility] clinic or [phone].        Care EveryWhere ID     This is your Care EveryWhere ID. This could be used by other organizations to access your [facility] medical records  [id number]        Your Vitals Were     Pulse Temperature Respirations Pulse Oximetry BMI (Body Mass Index)       56 97  F (36.1  C) (Oral) 20 98% 56.23 kg/m2        Blood Pressure from Last 3 Encounters:   17 148/80   17 132/62   17 142/90    Weight from Last 3 Encounters:   17 (!) 158 kg (348 lb 6.4 oz)   17 (!) 161 kg (355 lb)   17 (!) 160.6 kg (354 lb)              Today, you had the following     No orders found for display         Today's Medication Changes          These changes are accurate as of: 17 10:57 AM.  If you have any questions, ask your nurse or doctor.               Start taking these medicines.        Dose/Directions    azithromycin 250 MG tablet   Commonly known as:  ZITHROMAX   Used for:  Acute bronchitis, unspecified organism   Started by:  Dennis Narayanan MD        Two tablets first day, then one tablet daily for four days.   Quantity:  6 tablet   Refills:  0            Where to get your medicines      These medications were sent to [facility] [id number] [place], [place] [address] AT [facility] [place] [facility]  [address], [facility] [address]     Phone:  [phone]     azithromycin 250 MG tablet                Primary Care Provider Office Phone # Fax #    Verito Elvie Cornell [phone]       [facility] [address]  [facility] [address]        Equal Access to Services     VERN PEARL AH: Andrés Howard, waezeda luqtee, qaybta kaalmajonnie preciado, dhiraj callejas. So [facility] [phone].    ATENCIÓN: Si habla español, tiene a yoder disposición servicios gratuitos de asistencia  lingüística. Miroslava al [phone].    We comply with applicable federal civil rights laws and [place] laws. We do not discriminate on the basis of race, color, national origin, age, disability sex, sexual orientation or gender identity.            Thank you!     Thank you for choosing [facility]  for your care. Our goal is always to provide you with excellent care. Hearing back from our patients is one way we can continue to improve our services. Please take a few minutes to complete the written survey that you may receive in the mail after your visit with us. Thank you!             Your Updated Medication List - Protect others around you: Learn how to safely use, store and throw away your medicines at www.disposemymeds.org.          This list is accurate as of: 9/8/17 10:57 AM.  Always use your most recent med list.                   Brand Name Dispense Instructions for use Diagnosis    ADVIL 200 MG tablet   Generic drug:  ibuprofen      Take 200 mg by mouth every 4 hours as needed for mild pain        albuterol (2.5 MG/3ML) 0.083% neb solution     25 vial    Take 1 vial (2.5 mg) by nebulization every 6 hours as needed    Cough       ascorbic acid 1000 MG Tabs    vitamin C     Take 1,000 mg by mouth daily        azithromycin 250 MG tablet    ZITHROMAX    6 tablet    Two tablets first day, then one tablet daily for four days.    Acute bronchitis, unspecified organism       glucosamine-chondroitin 500-400 MG Caps per capsule      Take 1 capsule by mouth 2 times daily        metoprolol 100 MG 24 hr tablet    TOPROL-XL    90 tablet    TAKE 1 TABLET BY MOUTH EVERY DAY    Encounter for medication refill, Essential hypertension       PROBIOTIC DAILY PO           TUMS PO      Take by mouth as needed        VITAMIN D (CHOLECALCIFEROL) PO      Take 5,000 Units by mouth daily 1000units with Vitamin C 500mg - 3 tabs am & 2 tabs pm

## 2017-09-08 NOTE — PROGRESS NOTES
"Took plain AMox from [employer] worked for a week and now coming back with a vengenous.  Rag weed is bad,.  Wonders about flonase    Swelling in the legs, was supposed to wear for three years.  Hard to tolerate due to \"fat Legs\" very pain furl around the ankles.    Tuesday saw an accupuncturist on the legs,   Dripped fluid from the needles!    Huge wakeup call around changing my life.    [facility] measured her and the stockings are actually working better.....    Problem(s) Oriented visit      ROS:  General and CV completed and negative except as noted above     HISTORY:   reports that she does not drink alcohol.   reports that she has never smoked. She has never used smokeless tobacco.    Past Medical History:   Diagnosis Date     Abnormal glucose 2/1/17     Appendicitis 9/2015     Depression      Hepatitis A      HTN (hypertension)      Obese      Osteoarthritis of left knee      Plantar fasciitis      Shingles      SVT (supraventricular tachycardia) (H) 8/15/2012     Uterine cancer (H) 3/2010     Venous dermatitis      Past Surgical History:   Procedure Laterality Date     ------------OTHER-------------  7-2011    cyst removal - back     CATHETER, ABLATION  1-2010    tachycardia ablation     D & C  3-2010     HYSTERECTOMY RADICAL  3-2010     LAPAROSCOPIC APPENDECTOMY  9/27/2013    Procedure: LAPAROSCOPIC APPENDECTOMY;  LAPAROSCOPIC APPENDECTOMY;  Surgeon: Noreen Brambila MD;  Location:  OR       EXAM:  BP: Data Unavailable   Pulse: 56    Temp: 97    Wt Readings from Last 2 Encounters:   09/08/17 (!) 158 kg (348 lb 6.4 oz)   04/27/17 (!) 161 kg (355 lb)       BMI= Body mass index is 56.23 kg/(m^2).    EXAM:  APPEARANCE: = Relaxed and in no distress  Conj/Eyelids = noninjected and lids and lashes are without inflammation  PERRLA/Irises = Pupils Round Reactive to Light and Irisis without inflammation  Ears/Nose = External structures and Nares have usual shape and form  Ear canals and TM's = Canals are not " "inflammed and have none or little wax and the drums are not injected and have a light reflex   Lips/Teeth/Gums = No lesions seen, good dentition, and gums seem healthy  Oropharynx = No leukoplakia, No injection to the tissues, Normal Uvula  Neck = No anterior or posterior adenopathy appreciated.  Resp effort = Calm regular breathing  Breath Sounds =  bilateral rhonchi  Mood/Affect = Cooperative and interested      Assessment/Plan:  Donna was seen today for edema and cough.    Diagnoses and all orders for this visit:    Morbid obesity due to excess calories (H)    Acute bronchitis, unspecified organism  -     azithromycin (ZITHROMAX) 250 MG tablet; Two tablets first day, then one tablet daily for four days.        COUNSELING:   reports that she has never smoked. She has never used smokeless tobacco.    Estimated body mass index is 56.23 kg/(m^2) as calculated from the following:    Height as of 11/5/15: 1.676 m (5' 6\").    Weight as of this encounter: 158 kg (348 lb 6.4 oz).   Weight management plan: Specific weight management program called Wheat belly discussed and follow up in 1 month in clinic to re-evaluate.    Appropriate preventive services were discussed with this patient, including applicable screening as appropriate for cardiovascular disease, diabetes, osteopenia/osteoporosis, and glaucoma.  As appropriate for age/gender, discussed screening for colorectal cancer, prostate cancer, breast cancer, and cervical cancer. Checklist reviewing preventive services available has been given to the patient.    Reviewed patients plan of care and provided an AVS. The Basic Care Plan (routine screening as documented in Health Maintenance) for Donna meets the Care Plan requirement. This Care Plan has been established and reviewed with the  Patient.      The following health maintenance items are reviewed in Epic and correct as of today:  Health Maintenance   Topic Date Due     HEPATITIS C SCREENING  03/17/1979     PAP " SCREENING Q3 YR (SYSTEM ASSIGNED)  03/17/1982     COLON CANCER SCREEN (SYSTEM ASSIGNED)  03/17/2011     MAMMO SCREEN Q2 YR (SYSTEM ASSIGNED)  03/28/2013     ADVANCE DIRECTIVE PLANNING Q5 YRS  03/17/2016     INFLUENZA VACCINE (SYSTEM ASSIGNED)  09/01/2017     PHQ-9 Q6 MONTHS  02/28/2018     TETANUS IMMUNIZATION (SYSTEM ASSIGNED)  03/07/2018     LIPID SCREEN Q5 YR FEMALE (SYSTEM ASSIGNED)  11/05/2020       Dennis Narayanan  [facility]  For any issues my office # is [phone]

## 2017-11-22 ENCOUNTER — TELEPHONE (OUTPATIENT)
Dept: FAMILY MEDICINE | Facility: CLINIC | Age: 57
End: 2017-11-22

## 2017-11-27 NOTE — TELEPHONE ENCOUNTER
We have received a refill request from some company.  This was denied with a note to have the patient call our office.  No name of the company was on the form and when tried to call the number there was no answer.

## 2017-12-07 ENCOUNTER — CARE COORDINATION (OUTPATIENT)
Dept: CARE COORDINATION | Facility: CLINIC | Age: 57
End: 2017-12-07

## 2017-12-08 NOTE — PROGRESS NOTES
Clinic Care Coordination Contact    Situation: Patient calling SHEBA CHRISTIANSON with questions about care for her mother.    Background: SHEBA CHRISTIANSON had been working with pt to help coordinate services. 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    Assessment: SHEBA CHRISTIANSON answered various questions and provided reassurance.    Plan/Recommendations: Pt denies any additional needs at this time. SHEBA CHRISTIANSON will be available if needed.    BREONICA Solorio  Clinic Care Coordinator  [phone]  [email or web address]

## 2018-01-04 ENCOUNTER — MEDICAL CORRESPONDENCE (OUTPATIENT)
Dept: FAMILY MEDICINE | Facility: CLINIC | Age: 58
End: 2018-01-04

## 2018-03-19 ENCOUNTER — OFFICE VISIT (OUTPATIENT)
Dept: FAMILY MEDICINE | Facility: CLINIC | Age: 58
End: 2018-03-19

## 2018-03-19 VITALS
WEIGHT: 293 LBS | DIASTOLIC BLOOD PRESSURE: 90 MMHG | RESPIRATION RATE: 20 BRPM | OXYGEN SATURATION: 98 % | BODY MASS INDEX: 56.98 KG/M2 | TEMPERATURE: 98.1 F | HEART RATE: 66 BPM | SYSTOLIC BLOOD PRESSURE: 156 MMHG

## 2018-03-19 DIAGNOSIS — E66.01 MORBID OBESITY (H): Primary | ICD-10-CM

## 2018-03-19 DIAGNOSIS — J98.01 ACUTE BRONCHOSPASM: ICD-10-CM

## 2018-03-19 PROCEDURE — 99213 OFFICE O/P EST LOW 20 MIN: CPT | Performed by: FAMILY MEDICINE

## 2018-03-19 RX ORDER — ALBUTEROL SULFATE 90 UG/1
2 AEROSOL, METERED RESPIRATORY (INHALATION) EVERY 6 HOURS PRN
Qty: 1 INHALER | Refills: 1 | Status: SHIPPED | OUTPATIENT
Start: 2018-03-19 | End: 2019-05-24

## 2018-03-19 RX ORDER — BUDESONIDE AND FORMOTEROL FUMARATE DIHYDRATE 80; 4.5 UG/1; UG/1
2 AEROSOL RESPIRATORY (INHALATION) 2 TIMES DAILY
Qty: 1 INHALER | Refills: 0 | COMMUNITY
Start: 2018-03-19 | End: 2019-05-24

## 2018-03-19 NOTE — MR AVS SNAPSHOT
"              After Visit Summary   3/19/2018    Donna Elmore    MRN: [id number]           Patient Information     Date Of Birth          1960        Visit Information        Provider Department      3/19/2018 3:00 PM Keanu Fitch MD [facility]        Today's Diagnoses     Morbid obesity (H)    -  1    Acute bronchospasm           Follow-ups after your visit        Who to contact     If you have questions or need follow up information about today's clinic visit or your schedule please contact [facility] directly at [phone].  Normal or non-critical lab and imaging results will be communicated to you by My[employer], letter or phone within 4 business days after the clinic has received the results. If you do not hear from us within 7 days, please contact the clinic through [employer] or phone. If you have a critical or abnormal lab result, we will notify you by phone as soon as possible.  Submit refill requests through [employer] or call your pharmacy and they will forward the refill request to us. Please allow 3 business days for your refill to be completed.          Additional Information About Your Visit        MyChart Information     [employer] lets you send messages to your doctor, view your test results, renew your prescriptions, schedule appointments and more. To sign up, go to [email or web address] . Click on \"Log in\" on the left side of the screen, which will take you to the Welcome page. Then click on \"Sign up Now\" on the right side of the page.     You will be asked to enter the access code listed below, as well as some personal information. Please follow the directions to create your username and password.     Your access code is: [email or web address]  Expires: 2018  5:21 PM     Your access code will  in 90 days. If you need help or a new code, please call your [facility] or [phone].        Care EveryWhere ID     This is your Care EveryWhere ID. This could be " used by other organizations to access your [facility] medical records  [id number]        Your Vitals Were     Pulse Temperature Respirations Pulse Oximetry BMI (Body Mass Index)       66 98.1  F (36.7  C) (Oral) 20 98% 56.98 kg/m2        Blood Pressure from Last 3 Encounters:   03/19/18 156/90   04/27/17 148/80   04/07/17 132/62    Weight from Last 3 Encounters:   03/19/18 (!) 160.1 kg (353 lb)   09/08/17 (!) 158 kg (348 lb 6.4 oz)   04/27/17 (!) 161 kg (355 lb)              Today, you had the following     No orders found for display         Today's Medication Changes          These changes are accurate as of 3/19/18  5:21 PM.  If you have any questions, ask your nurse or doctor.               These medicines have changed or have updated prescriptions.        Dose/Directions    * albuterol (2.5 MG/3ML) 0.083% neb solution   This may have changed:  Another medication with the same name was added. Make sure you understand how and when to take each.   Used for:  Cough   Changed by:  Keanu Fitch MD        Dose:  1 vial   Take 1 vial (2.5 mg) by nebulization every 6 hours as needed   Quantity:  25 vial   Refills:  0       * albuterol 108 (90 BASE) MCG/ACT Inhaler   Commonly known as:  PROAIR HFA/PROVENTIL HFA/VENTOLIN HFA   This may have changed:  You were already taking a medication with the same name, and this prescription was added. Make sure you understand how and when to take each.   Used for:  Acute bronchospasm   Changed by:  Keanu Fitch MD        Dose:  2 puff   Inhale 2 puffs into the lungs every 6 hours as needed for shortness of breath / dyspnea or wheezing   Quantity:  1 Inhaler   Refills:  1       * Notice:  This list has 2 medication(s) that are the same as other medications prescribed for you. Read the directions carefully, and ask your doctor or other care provider to review them with you.         Where to get your medicines      These medications were sent to [id number] -  [place], [place] - [address] AT [facility] [place]  [address], [facility] [address]     Phone:  [phone]     albuterol 108 (90 BASE) MCG/ACT Inhaler                Primary Care Provider Office Phone # Fax #    Verito Elvie Cornell [phone]       [facility] [address]  [facility] [address]        Equal Access to Services     NICCI PEARL : Hadii aad ku hadasho Soomaali, waaxda luqadaha, qaybta kaalmada adeegyada, waxay idiin hayaan adeeg kharash la'aan . So [facility] [phone].    ATENCIÓN: Si habla español, tiene a yoder disposición servicios gratuitos de asistencia lingüística. [facility] [phone].    We comply with applicable federal civil rights laws and [place] laws. We do not discriminate on the basis of race, color, national origin, age, disability, sex, sexual orientation, or gender identity.            Thank you!     Thank you for choosing [facility]  for your care. Our goal is always to provide you with excellent care. Hearing back from our patients is one way we can continue to improve our services. Please take a few minutes to complete the written survey that you may receive in the mail after your visit with us. Thank you!             Your Updated Medication List - Protect others around you: Learn how to safely use, store and throw away your medicines at www.disposemymeds.org.          This list is accurate as of 3/19/18  5:21 PM.  Always use your most recent med list.                   Brand Name Dispense Instructions for use Diagnosis    ADVIL 200 MG tablet   Generic drug:  ibuprofen      Take 200 mg by mouth every 4 hours as needed for mild pain        * albuterol (2.5 MG/3ML) 0.083% neb solution     25 vial    Take 1 vial (2.5 mg) by nebulization every 6 hours as needed    Cough       * albuterol 108 (90 BASE) MCG/ACT Inhaler    PROAIR HFA/PROVENTIL HFA/VENTOLIN HFA    1 Inhaler    Inhale 2 puffs into the  lungs every 6 hours as needed for shortness of breath / dyspnea or wheezing    Acute bronchospasm       ascorbic acid 1000 MG Tabs    vitamin C     Take 1,000 mg by mouth daily        azithromycin 250 MG tablet    ZITHROMAX    6 tablet    Two tablets first day, then one tablet daily for four days.    Acute bronchitis, unspecified organism       budesonide-formoterol 80-4.5 MCG/ACT Inhaler    SYMBICORT    1 Inhaler    Inhale 2 puffs into the lungs 2 times daily    Acute bronchospasm       glucosamine-chondroitin 500-400 MG Caps per capsule      Take 1 capsule by mouth 2 times daily        metoprolol succinate 100 MG 24 hr tablet    TOPROL-XL    90 tablet    TAKE 1 TABLET BY MOUTH EVERY DAY    Encounter for medication refill, Essential hypertension       PROBIOTIC DAILY PO           TUMS PO      Take by mouth as needed        VITAMIN D (CHOLECALCIFEROL) PO      Take 5,000 Units by mouth daily 1000units with Vitamin C 500mg - 3 tabs am & 2 tabs pm        * Notice:  This list has 2 medication(s) that are the same as other medications prescribed for you. Read the directions carefully, and ask your doctor or other care provider to review them with you.

## 2018-03-19 NOTE — PROGRESS NOTES
5 days of clear runny nose, cough and slight sore throat. T99 and chills. No SOB or chest pain.  Nonsmoker.  ROS: no nausea or vomiting  OBJECTIVE: /90  Pulse 66  Temp 98.1  F (36.7  C) (Oral)  Resp 20  Wt (!) 160.1 kg (353 lb)  SpO2 98%  BMI 56.98 kg/m2   NAD TM's OK. Turbinates red and swollen. Pharynx injected. No nodes. Lungs are clear, and cor RRR.      (J98.01) Acute bronchospasm  Comment:   Plan: budesonide-formoterol (SYMBICORT) 80-4.5         MCG/ACT Inhaler, albuterol (PROAIR         HFA/PROVENTIL HFA/VENTOLIN HFA) 108 (90 BASE)         MCG/ACT Inhaler

## 2018-03-20 RX ORDER — INHALER, ASSIST DEVICES
1 SPACER (EA) MISCELLANEOUS PRN
Qty: 1 EACH | Refills: 0 | Status: SHIPPED | OUTPATIENT
Start: 2018-03-20 | End: 2019-05-24

## 2018-03-20 ASSESSMENT — PATIENT HEALTH QUESTIONNAIRE - PHQ9: SUM OF ALL RESPONSES TO PHQ QUESTIONS 1-9: 14

## 2018-03-28 DIAGNOSIS — Z76.0 ENCOUNTER FOR MEDICATION REFILL: ICD-10-CM

## 2018-03-28 DIAGNOSIS — I10 ESSENTIAL HYPERTENSION: ICD-10-CM

## 2018-03-28 RX ORDER — METOPROLOL SUCCINATE 100 MG/1
100 TABLET, EXTENDED RELEASE ORAL DAILY
Qty: 90 TABLET | Refills: 0 | Status: SHIPPED | OUTPATIENT
Start: 2018-03-28 | End: 2018-06-28

## 2018-03-28 NOTE — TELEPHONE ENCOUNTER
metoprolol---last seen 3/19/18(not related), last related appointment was on 2/1/17--patient needs appointment ( I will contact)

## 2018-03-30 DIAGNOSIS — J20.9 ACUTE BRONCHITIS, UNSPECIFIED ORGANISM: Primary | ICD-10-CM

## 2018-03-30 RX ORDER — AZITHROMYCIN 250 MG/1
TABLET, FILM COATED ORAL
Qty: 6 TABLET | Refills: 0 | Status: SHIPPED | OUTPATIENT
Start: 2018-03-30 | End: 2018-08-27

## 2018-06-28 DIAGNOSIS — I10 ESSENTIAL HYPERTENSION: ICD-10-CM

## 2018-06-28 DIAGNOSIS — Z76.0 ENCOUNTER FOR MEDICATION REFILL: ICD-10-CM

## 2018-06-28 RX ORDER — METOPROLOL SUCCINATE 100 MG/1
100 TABLET, EXTENDED RELEASE ORAL DAILY
Qty: 90 TABLET | Refills: 0 | Status: SHIPPED | OUTPATIENT
Start: 2018-06-28 | End: 2018-09-27

## 2018-08-27 ENCOUNTER — OFFICE VISIT (OUTPATIENT)
Dept: FAMILY MEDICINE | Facility: CLINIC | Age: 58
End: 2018-08-27

## 2018-08-27 VITALS
SYSTOLIC BLOOD PRESSURE: 152 MMHG | WEIGHT: 293 LBS | OXYGEN SATURATION: 97 % | DIASTOLIC BLOOD PRESSURE: 90 MMHG | HEART RATE: 64 BPM | RESPIRATION RATE: 16 BRPM | BODY MASS INDEX: 56.65 KG/M2

## 2018-08-27 DIAGNOSIS — C55 MALIGNANT NEOPLASM OF UTERUS, UNSPECIFIED SITE (H): ICD-10-CM

## 2018-08-27 DIAGNOSIS — Z23 NEED FOR VACCINATION: ICD-10-CM

## 2018-08-27 DIAGNOSIS — E66.01 MORBID OBESITY (H): ICD-10-CM

## 2018-08-27 DIAGNOSIS — Z11.59 ENCOUNTER FOR HCV SCREENING TEST FOR LOW RISK PATIENT: ICD-10-CM

## 2018-08-27 DIAGNOSIS — E11.9 TYPE 2 DIABETES MELLITUS WITHOUT COMPLICATION, WITHOUT LONG-TERM CURRENT USE OF INSULIN (H): ICD-10-CM

## 2018-08-27 DIAGNOSIS — F33.0 MAJOR DEPRESSIVE DISORDER, RECURRENT EPISODE, MILD (H): ICD-10-CM

## 2018-08-27 DIAGNOSIS — Z12.11 SPECIAL SCREENING FOR MALIGNANT NEOPLASMS, COLON: Primary | ICD-10-CM

## 2018-08-27 LAB
% GRANULOCYTES: 75.2 % (ref 42.2–75.2)
HCT VFR BLD AUTO: 43.5 % (ref 35–46)
HEMOGLOBIN: 14.3 G/DL (ref 11.8–15.5)
LYMPHOCYTES NFR BLD AUTO: 18 % (ref 20.5–51.1)
MCH RBC QN AUTO: 26.9 PG (ref 27–31)
MCHC RBC AUTO-ENTMCNC: 33 G/DL (ref 33–37)
MCV RBC AUTO: 81.5 FL (ref 80–100)
MONOCYTES NFR BLD AUTO: 6.8 % (ref 1.7–9.3)
PLATELET # BLD AUTO: 220 K/UL (ref 140–450)
RBC # BLD AUTO: 5.33 X10/CMM (ref 3.7–5.2)
WBC # BLD AUTO: 6.6 X10/CMM (ref 3.8–11)

## 2018-08-27 PROCEDURE — 90471 IMMUNIZATION ADMIN: CPT | Performed by: FAMILY MEDICINE

## 2018-08-27 PROCEDURE — 36415 COLL VENOUS BLD VENIPUNCTURE: CPT | Performed by: FAMILY MEDICINE

## 2018-08-27 PROCEDURE — 90750 HZV VACC RECOMBINANT IM: CPT | Performed by: FAMILY MEDICINE

## 2018-08-27 PROCEDURE — 90472 IMMUNIZATION ADMIN EACH ADD: CPT | Performed by: FAMILY MEDICINE

## 2018-08-27 PROCEDURE — 85025 COMPLETE CBC W/AUTO DIFF WBC: CPT | Performed by: FAMILY MEDICINE

## 2018-08-27 PROCEDURE — 99214 OFFICE O/P EST MOD 30 MIN: CPT | Mod: 25 | Performed by: FAMILY MEDICINE

## 2018-08-27 PROCEDURE — 90714 TD VACC NO PRESV 7 YRS+ IM: CPT | Performed by: FAMILY MEDICINE

## 2018-08-27 NOTE — PROGRESS NOTES
Problem(s) Oriented visit        SUBJECTIVE:                                                    Donna Elmore is a 58 year old female who presents to clinic today for the following health issues :        1. Special screening for malignant neoplasms, colon  due  - GASTROENTEROLOGY ADULT REF PROCEDURE ONLY  - MAMMO - Diagnostic Digital Bilateral ([facility]); Future    2. Morbid obesity (H)  The patient has had a history of ongoing obesity.  Reviewed the weigth curves.   Their current BMI is:  Body mass index is 56.65 kg/(m^2).  Reviewed previous attempts at weight loss which have not been successful in producing prolonged weigth loss.   Discussed current eating and exercise habits.     Reviewed weights in chart:  Wt Readings from Last 10 Encounters:   08/27/18 (!) 159.2 kg (351 lb)   03/19/18 (!) 160.1 kg (353 lb)   09/08/17 (!) 158 kg (348 lb 6.4 oz)   04/27/17 (!) 161 kg (355 lb)   03/23/17 (!) 160.6 kg (354 lb)   02/01/17 (!) 158.3 kg (349 lb)   09/12/16 (!) 158.3 kg (349 lb)   03/23/16 (!) 163.7 kg (361 lb)   03/10/16 (!) 161.5 kg (356 lb)   03/07/16 (!) 161.5 kg (356 lb)         3.Type 2 diabetes mellitus without complication, without long-term current use of insulin (H)  Diabetes  she  reports no new symptoms.  No significnat or regular episodes of hypo or hyperglycemia  Medication compliance: compliant most of the time  Diabetic diet compliance: compliant most of the time  Diabetic ROS: no polyuria or polydipsia, no chest pain, dyspnea or TIA's, no numbness, tingling or pain in extremities    Home blood sugar monitoring: are performed regularly, Review of patients self blood glucose monitoring shows fasting most always < 130 and post prandial average under 170.  As a direct cause of their history of diabetes they have the following Diabetic complications: none    Most  recent A1C: Smoking: BP:   Lab Results   Component Value Date    A1C 6.5 02/01/2017    History   Smoking Status     Never Smoker    Smokeless Tobacco     Never Used    BP Readings from Last 1 Encounters:   08/27/18 152/90        Kidney studies:  Creatinine   Date Value Ref Range Status   09/12/2016 0.81 0.57 - 1.00 mg/dL Final   10/20/2015 0.69 0.57 - 1.00 mg/dL Final   10/20/2014 0.86 0.57 - 1.00 mg/dL Final   ]    GFR Estimate   Date Value Ref Range Status   09/27/2013 88 >60 mL/min/1.7m2 Final                No components found for: MICORALBUMINLC      GFR Estimate If Black   Date Value Ref Range Status   09/27/2013 >90 >60 mL/min/1.7m2 Final              Statin and ASA:  Current Outpatient Prescriptions   Medication     ascorbic acid (VITAMIN C) 1000 MG TABS     Calcium Carbonate Antacid (TUMS PO)     glucosamine-chondroitin 500-400 MG CAPS     ibuprofen (ADVIL) 200 MG tablet     metoprolol succinate (TOPROL-XL) 100 MG 24 hr tablet     VITAMIN D, CHOLECALCIFEROL, PO     albuterol (2.5 MG/3ML) 0.083% neb solution     albuterol (PROAIR HFA/PROVENTIL HFA/VENTOLIN HFA) 108 (90 BASE) MCG/ACT Inhaler     budesonide-formoterol (SYMBICORT) 80-4.5 MCG/ACT Inhaler     Probiotic Product (PROBIOTIC DAILY PO)     Spacer/Aero-Holding Chambers (AEROCHAMBER MINI CHAMBER) AMANDEEP     No current facility-administered medications for this visit.        4. Major depressive disorder, recurrent episode, mild (H)  Depression:  Has known history of depression.  Has been on medication for this.  The patient does not report any significant side effects of this medication.  The prior symptoms leading to the original diagnosis and decision to start medication therapy are better.     Appetite is stable.  Sleeping patterns are stable.  No reported thoughts of suicide or homicide.    Last 3 PHQ9 results:  PHQ-9 SCORE 2/1/2017 8/30/2017 3/19/2018 8/27/2018   Total Score 17 9 14 10       - Comp. Metabolic Panel (14) ([facility])  - Lipid Panel ([facility])  - CBC with Diff/Plt ([facility])  - TSH ([facility])    5. Malignant neoplasm of uterus, unspecified site (H)  Check cbc    6.  Encounter for HCV screening test for low risk patient  due  - HCV Antibody ([facility])         Problem list, Medication list, Allergies, and Medical/Social/Surgical histories reviewed in EPIC and updated as appropriate.   Additional history: as documented    ROS:  General and Resp. completed and negative except as noted above    Histories:   Patient Active Problem List   Diagnosis     Uterine cancer (H)     SVT (supraventricular tachycardia) (H)     Major depressive disorder, recurrent episode, mild (H)     Osteoarthritis of left knee     HTN (hypertension)     Morbid obesity (H)     Diabetes mellitus, type 2 (H)     Past Surgical History:   Procedure Laterality Date     ------------OTHER-------------  7-2011    cyst removal - back     CATHETER, ABLATION  1-2010    tachycardia ablation     D & C  3-2010     HYSTERECTOMY RADICAL  3-2010     LAPAROSCOPIC APPENDECTOMY  9/27/2013    Procedure: LAPAROSCOPIC APPENDECTOMY;  LAPAROSCOPIC APPENDECTOMY;  Surgeon: Noreen Brambila MD;  Location:  OR       Social History   Substance Use Topics     Smoking status: Never Smoker     Smokeless tobacco: Never Used     Alcohol use No     Family History   Problem Relation Age of Onset     HEART DISEASE Mother      pacemaker, heart failure     Thyroid Disease Mother      Hypertension Mother      Cancer Father      kidney     HEART DISEASE Father      Hypertension Father      Thyroid Disease Sister            OBJECTIVE:                                                    /90  Pulse 64  Resp 16  Wt (!) 159.2 kg (351 lb)  SpO2 97%  BMI 56.65 kg/m2  Body mass index is 56.65 kg/(m^2).   APPEARANCE: = Relaxed and in no distress  Conj/Eyelids = noninjected and lids and lashes are without inflammation  PERRLA/Irises = Pupils Round Reactive to Light and Irisis without inflammation  Neck = No anterior or posterior adenopathy appreciated.  Thyroid = Not enlarged and no masses felt  Resp effort = Calm regular breathing  Breath Sounds =  Good air movement with no rales or rhonchi in any lung fields  Heart Rate, Rhythm, & sounds (no Murm)  = Regular rate and rhythm with no S3, S4, or murmur appreciated.  Carotid Art's = Pulses full and equal and no bruits appreciated  Abdomen = Soft, nontender, no masses, & bowel sounds in all quadrants  Liver/Spleen = Normal span and no splenomegaly noted  Digits and Nails = FROM in all finger joints, no nail dystrophy  Ext (edema) = No pretibial edema noted or elsewhere  Musculsktl =  Strength and ROM of major joints are within normal limits  SKIN = absent significant rashes or lesions   Recent/Remote Memory = Alert and Oriented x 3  Mood/Affect = Cooperative and interested     ASSESSMENT/PLAN:                                                        Donna was seen today for recheck medication.    Diagnoses and all orders for this visit:    Special screening for malignant neoplasms, colon  -     GASTROENTEROLOGY ADULT REF PROCEDURE ONLY  -     MAMMO - Diagnostic Digital Bilateral ([facility]); Future    Morbid obesity (H)    Major depressive disorder, recurrent episode, mild (H)    Type 2 diabetes mellitus without complication, without long-term current use of insulin (H)  -     Comp. Metabolic Panel (14) ([facility])  -     Lipid Panel ([facility])  -     CBC with Diff/Plt ([facility])  -     TSH ([facility])    Malignant neoplasm of uterus, unspecified site (H)    Encounter for HCV screening test for low risk patient  -     HCV Antibody ([facility])    Other orders  -     Cancel: HIV 1/0/2 Rflx ([facility])            The following health maintenance items are reviewed in Epic and correct as of today:  Health Maintenance   Topic Date Due     DEPRESSION ACTION PLAN Q1 YR  03/17/1979     HIV SCREEN (SYSTEM ASSIGNED)  03/17/1979     HEPATITIS C SCREENING  03/17/1979     PAP SCREENING Q3 YR (SYSTEM ASSIGNED)  03/17/1982     COLON CANCER SCREEN (SYSTEM ASSIGNED)  03/17/2011     MAMMO SCREEN Q2 YR (SYSTEM ASSIGNED)  03/28/2013      ADVANCE DIRECTIVE PLANNING Q5 YRS  03/17/2016     TETANUS IMMUNIZATION (SYSTEM ASSIGNED)  03/07/2018     INFLUENZA VACCINE (1) 09/01/2018     PHQ-9 Q6 MONTHS  02/27/2019     LIPID SCREEN Q5 YR FEMALE (SYSTEM ASSIGNED)  11/05/2020       Dennis Narayanan MD  [facility]  [phone]    For any issues my office # is [phone]

## 2018-08-27 NOTE — MR AVS SNAPSHOT
After Visit Summary   8/27/2018    Donna Elmore    MRN: [id number]           Patient Information     Date Of Birth          1960        Visit Information        Provider Department      8/27/2018 9:30 AM Dennis Narayanan MD [facility]        Today's Diagnoses     Special screening for malignant neoplasms, colon    -  1    BMI of 56        Major depressive disorder, recurrent episode, mild (H)        Type 2 diabetes mellitus without complication, without long-term current use of insulin (H)        Malignant neoplasm of uterus, unspecified site (H)        Encounter for HCV screening test for low risk patient        Need for vaccination          Care Instructions    Cooking lessons with friends     Boxing or other exercise.    Finish the wheat belly.          Follow-ups after your visit        Additional Services     GASTROENTEROLOGY ADULT REF PROCEDURE ONLY       Last Lab Result: Creatinine (mg/dL)       Date                     Value                 09/12/2016               0.81             ----------  Body mass index is 56.65 kg/(m^2).     Needed:  No  Language:  English    Patient will be contacted to schedule procedure.     Please be aware that coverage of these services is subject to the terms and limitations of your health insurance plan.  Call member services at your health plan with any benefit or coverage questions.  Any procedures must be performed at a [facility] facility OR coordinated by your clinic's referral office.    Please bring the following with you to your appointment:    (1) Any X-Rays, CTs or MRIs which have been performed.  Contact the facility where they were done to arrange for  prior to your scheduled appointment.    (2) List of current medications   (3) This referral request   (4) Any documents/labs given to you for this referral                  Future tests that were ordered for you today     Open Future Orders        Priority  "Expected Expires Ordered    MAMMO - Diagnostic Digital Bilateral ([facility]) Routine  2019            Who to contact     If you have questions or need follow up information about today's clinic visit or your schedule please contact [facility] directly at [phone].  Normal or non-critical lab and imaging results will be communicated to you by MyChart, letter or phone within 4 business days after the clinic has received the results. If you do not hear from us within 7 days, please contact the clinic through MyChart or phone. If you have a critical or abnormal lab result, we will notify you by phone as soon as possible.  Submit refill requests through [employer] or call your pharmacy and they will forward the refill request to us. Please allow 3 business days for your refill to be completed.          Additional Information About Your Visit        MyChart Information     [employer] lets you send messages to your doctor, view your test results, renew your prescriptions, schedule appointments and more. To sign up, go to [email or web address] . Click on \"Log in\" on the left side of the screen, which will take you to the Welcome page. Then click on \"Sign up Now\" on the right side of the page.     You will be asked to enter the access code listed below, as well as some personal information. Please follow the directions to create your username and password.     Your access code is: [email or web address]  Expires: 2018 10:37 AM     Your access code will  in 90 days. If you need help or a new code, please call your [facility] clinic or [phone].        Care EveryWhere ID     This is your Care EveryWhere ID. This could be used by other organizations to access your [facility] medical records  [id number]        Your Vitals Were     Pulse Respirations Pulse Oximetry BMI (Body Mass Index)          64 16 97% 56.65 kg/m2         Blood Pressure from Last 3 Encounters:   18 152/90 "   03/19/18 156/90   04/27/17 148/80    Weight from Last 3 Encounters:   08/27/18 (!) 159.2 kg (351 lb)   03/19/18 (!) 160.1 kg (353 lb)   09/08/17 (!) 158 kg (348 lb 6.4 oz)              We Performed the Following     CBC with Diff/Plt ([facility])     Comp. Metabolic Panel (14) ([facility])     GASTROENTEROLOGY ADULT REF PROCEDURE ONLY     HCV Antibody ([facility])     Hemoglobin A1C ([facility])     Lipid Panel ([facility])     TDAP VACCINE (BOOSTRIX)     TSH ([facility])     ZOSTER VACCINE RECOMBINANT ADJUVANTED IM NJX        Primary Care Provider Office Phone # Fax #    Dennis Narayanan [phone] [address]  [facility] [address]        Equal Access to Services     [facility] Center[facility] : Hadii aad nolan hadasho Soomaali, waaxda luqadaha, qaybta kaalmada adeegyada, dhiraj britt . So [facility] [phone].    ATENCIÓN: Si habla español, tiene a yoder disposición servicios gratuitos de asistencia lingüística. Llame al [phone].    We comply with applicable federal civil rights laws and [place] laws. We do not discriminate on the basis of race, color, national origin, age, disability, sex, sexual orientation, or gender identity.            Thank you!     Thank you for choosing [facility]  for your care. Our goal is always to provide you with excellent care. Hearing back from our patients is one way we can continue to improve our services. Please take a few minutes to complete the written survey that you may receive in the mail after your visit with us. Thank you!             Your Updated Medication List - Protect others around you: Learn how to safely use, store and throw away your medicines at www.disposemymeds.org.          This list is accurate as of 8/27/18 10:37 AM.  Always use your most recent med list.                   Brand Name Dispense Instructions for use Diagnosis    ADVIL 200 MG tablet   Generic drug:  ibuprofen      Take 200 mg by mouth every 4 hours as  needed for mild pain        AEROCHAMBER MINI CHAMBER Ana     1 each    1 Units as needed    Acute bronchospasm       * albuterol (2.5 MG/3ML) 0.083% neb solution     25 vial    Take 1 vial (2.5 mg) by nebulization every 6 hours as needed    Cough       * albuterol 108 (90 Base) MCG/ACT inhaler    PROAIR HFA/PROVENTIL HFA/VENTOLIN HFA    1 Inhaler    Inhale 2 puffs into the lungs every 6 hours as needed for shortness of breath / dyspnea or wheezing    Acute bronchospasm       ascorbic acid 1000 MG Tabs    vitamin C     Take 1,000 mg by mouth daily        budesonide-formoterol 80-4.5 MCG/ACT Inhaler    SYMBICORT    1 Inhaler    Inhale 2 puffs into the lungs 2 times daily    Acute bronchospasm       glucosamine-chondroitin 500-400 MG Caps per capsule      Take 1 capsule by mouth 2 times daily        metoprolol succinate 100 MG 24 hr tablet    TOPROL-XL    90 tablet    Take 1 tablet (100 mg) by mouth daily    Encounter for medication refill, Essential hypertension       PROBIOTIC DAILY PO           TUMS PO      Take by mouth as needed        VITAMIN D (CHOLECALCIFEROL) PO      Take 5,000 Units by mouth daily 1000units with Vitamin C 500mg - 3 tabs am & 2 tabs pm        * Notice:  This list has 2 medication(s) that are the same as other medications prescribed for you. Read the directions carefully, and ask your doctor or other care provider to review them with you.

## 2018-08-27 NOTE — LETTER
[facility] [address] [place]  [address]  Phone: [phone]    August 28, 2018      Donna Elmore  [address]  [facility] [address]              Dear Donna,    The results from your recent visit showed I am writing to report that your included test results are within expected ranges. I do not suggest that we make any changes at this time.        Sincerely,     Dennis Narayanan M.D.    Results for orders placed or performed in visit on 08/27/18   HCV Antibody ([facility])   Result Value Ref Range    Hep C Virus Ab <0.1 0.0 - 0.9 s/co ratio    Narrative    Performed at:  01 - [facility] [address], [place], [place]  [id number]  : Sai Bailey MD, Phone:  [phone]   Comp. Metabolic Panel (14) ([facility])   Result Value Ref Range    Glucose 122 (H) 65 - 99 mg/dL    Urea Nitrogen 15 6 - 24 mg/dL    Creatinine 0.97 0.57 - 1.00 mg/dL    eGFR If NonAfricn Am 65 >59 mL/min/1.73    eGFR If Africn Am 75 >59 mL/min/1.73    BUN/Creatinine Ratio 15 9 - 23    Sodium 145 (H) 134 - 144 mmol/L    Potassium 4.8 3.5 - 5.2 mmol/L    Chloride 105 96 - 106 mmol/L    Total CO2 26 20 - 29 mmol/L    Calcium 9.3 8.7 - 10.2 mg/dL    Protein Total 7.1 6.0 - 8.5 g/dL    Albumin 4.4 3.5 - 5.5 g/dL    Globulin, Total 2.7 1.5 - 4.5 g/dL    A/G Ratio 1.6 1.2 - 2.2    Bilirubin Total 0.6 0.0 - 1.2 mg/dL    Alkaline Phosphatase 83 39 - 117 IU/L    AST 17 0 - 40 IU/L    ALT 25 0 - 32 IU/L    Narrative    Performed at:  01 - [facility]  [address] [place], [place]  [id number]  : Sai Bailey MD, Phone:  [phone]   Lipid Panel ([facility])   Result Value Ref Range    Cholesterol 146 100 - 199 mg/dL    Triglycerides 59 0 - 149 mg/dL    HDL Cholesterol 56 >39 mg/dL    VLDL Cholesterol Chad 12 5 - 40 mg/dL    LDL Cholesterol Calculated 78 0 - 99 mg/dL    LDL/HDL Ratio 1.4 0.0 - 3.2 ratio    Narrative    Performed at:  01 - [facility]  [address], [place]   [id number]  : Sai Bailey MD, Phone:  [phone]   CBC with Diff/Plt ([facility])   Result Value Ref Range    WBC x10/cmm 6.6 3.8 - 11.0 x10/cmm    % Lymphocytes 18.0 (A) 20.5 - 51.1 %    % Monocytes 6.8 1.7 - 9.3 %    % Granulocytes 75.2 42.2 - 75.2 %    RBC x10/cmm 5.33 (A) 3.7 - 5.2 x10/cmm    Hemoglobin 14.3 11.8 - 15.5 g/dl    Hematocrit 43.5 35 - 46 %    MCV 81.5 80 - 100 fL    MCH 26.9 (A) 27.0 - 31.0 pg    MCHC 33.0 33.0 - 37.0 g/dL    Platelet Count 220 140 - 450 K/uL   TSH ([facility])   Result Value Ref Range    TSH 3.990 0.450 - 4.500 uIU/mL    Narrative    Performed at:  01 - [facility] [address], [place], [place]  [id number]  : Sai Bailey MD, Phone:  [phone]   Hemoglobin A1C ([facility])   Result Value Ref Range    Hemoglobin A1C 6.2 (H) 4.8 - 5.6 %    Narrative    Performed at:  01 - [facility] [address], [place], [place]  [id number]  : Sai Bailey MD, Phone:  [phone]

## 2018-08-28 LAB
ALBUMIN SERPL-MCNC: 4.4 G/DL (ref 3.5–5.5)
ALBUMIN/GLOB SERPL: 1.6 {RATIO} (ref 1.2–2.2)
ALP SERPL-CCNC: 83 IU/L (ref 39–117)
ALT SERPL-CCNC: 25 IU/L (ref 0–32)
AST SERPL-CCNC: 17 IU/L (ref 0–40)
BILIRUB SERPL-MCNC: 0.6 MG/DL (ref 0–1.2)
BUN SERPL-MCNC: 15 MG/DL (ref 6–24)
BUN/CREATININE RATIO: 15 (ref 9–23)
CALCIUM SERPL-MCNC: 9.3 MG/DL (ref 8.7–10.2)
CHLORIDE SERPLBLD-SCNC: 105 MMOL/L (ref 96–106)
CHOLEST SERPL-MCNC: 146 MG/DL (ref 100–199)
CREAT SERPL-MCNC: 0.97 MG/DL (ref 0.57–1)
EGFR IF AFRICN AM: 75 ML/MIN/1.73
EGFR IF NONAFRICN AM: 65 ML/MIN/1.73
GLOBULIN, TOTAL: 2.7 G/DL (ref 1.5–4.5)
GLUCOSE SERPL-MCNC: 122 MG/DL (ref 65–99)
HBA1C MFR BLD: 6.2 % (ref 4.8–5.6)
HCV AB SERPL QL IA: <0.1 S/CO RATIO (ref 0–0.9)
HDLC SERPL-MCNC: 56 MG/DL
LDL/HDL RATIO: 1.4 RATIO (ref 0–3.2)
LDLC SERPL CALC-MCNC: 78 MG/DL (ref 0–99)
POTASSIUM SERPL-SCNC: 4.8 MMOL/L (ref 3.5–5.2)
PROT SERPL-MCNC: 7.1 G/DL (ref 6–8.5)
SODIUM SERPL-SCNC: 145 MMOL/L (ref 134–144)
TOTAL CO2: 26 MMOL/L (ref 20–29)
TRIGL SERPL-MCNC: 59 MG/DL (ref 0–149)
TSH BLD-ACNC: 3.99 UIU/ML (ref 0.45–4.5)
VLDLC SERPL CALC-MCNC: 12 MG/DL (ref 5–40)

## 2018-08-28 ASSESSMENT — PATIENT HEALTH QUESTIONNAIRE - PHQ9: SUM OF ALL RESPONSES TO PHQ QUESTIONS 1-9: 10

## 2018-08-28 NOTE — PROGRESS NOTES
Dear Donna,   I am writing to report that your included test results are within expected ranges. I do not suggest that we make any changes at this time.    Dennis Narayanan M.D.

## 2018-09-27 DIAGNOSIS — I10 ESSENTIAL HYPERTENSION: ICD-10-CM

## 2018-09-27 DIAGNOSIS — Z76.0 ENCOUNTER FOR MEDICATION REFILL: ICD-10-CM

## 2018-09-27 RX ORDER — METOPROLOL SUCCINATE 100 MG/1
100 TABLET, EXTENDED RELEASE ORAL DAILY
Qty: 90 TABLET | Refills: 1 | Status: SHIPPED | OUTPATIENT
Start: 2018-09-27 | End: 2019-03-26

## 2019-03-26 DIAGNOSIS — Z76.0 ENCOUNTER FOR MEDICATION REFILL: ICD-10-CM

## 2019-03-26 DIAGNOSIS — I10 ESSENTIAL HYPERTENSION: ICD-10-CM

## 2019-03-26 RX ORDER — METOPROLOL SUCCINATE 100 MG/1
TABLET, EXTENDED RELEASE ORAL
Qty: 90 TABLET | Refills: 0 | Status: SHIPPED | OUTPATIENT
Start: 2019-03-26 | End: 2019-06-25

## 2019-03-26 NOTE — TELEPHONE ENCOUNTER
METOPROLOL  Last OV & Labs 8/27/18    BP Readings from Last 3 Encounters:   08/27/18 152/90   03/19/18 156/90   04/27/17 148/80     Last Comprehensive Metabolic Panel:  Sodium   Date Value Ref Range Status   08/27/2018 145 (H) 134 - 144 mmol/L Final     Potassium   Date Value Ref Range Status   08/27/2018 4.8 3.5 - 5.2 mmol/L Final     Chloride   Date Value Ref Range Status   08/27/2018 105 96 - 106 mmol/L Final     Carbon Dioxide   Date Value Ref Range Status   09/27/2013 29 20 - 32 mmol/L Final     Anion Gap   Date Value Ref Range Status   09/27/2013 3 (L) 6 - 17 mmol/L Final     Glucose   Date Value Ref Range Status   08/27/2018 122 (H) 65 - 99 mg/dL Final     Urea Nitrogen   Date Value Ref Range Status   08/27/2018 15 6 - 24 mg/dL Final     BUN/Creatinine Ratio   Date Value Ref Range Status   08/27/2018 15 9 - 23 Final     Creatinine   Date Value Ref Range Status   08/27/2018 0.97 0.57 - 1.00 mg/dL Final     GFR Estimate   Date Value Ref Range Status   09/27/2013 88 >60 mL/min/1.7m2 Final     Calcium   Date Value Ref Range Status   08/27/2018 9.3 8.7 - 10.2 mg/dL Final

## 2019-05-24 ENCOUNTER — OFFICE VISIT (OUTPATIENT)
Dept: FAMILY MEDICINE | Facility: CLINIC | Age: 59
End: 2019-05-24

## 2019-05-24 VITALS
SYSTOLIC BLOOD PRESSURE: 125 MMHG | DIASTOLIC BLOOD PRESSURE: 85 MMHG | HEART RATE: 65 BPM | WEIGHT: 293 LBS | HEIGHT: 66 IN | BODY MASS INDEX: 47.09 KG/M2

## 2019-05-24 DIAGNOSIS — S60.561A INSECT BITE OF RIGHT HAND, INITIAL ENCOUNTER: Primary | ICD-10-CM
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
## 2019-05-24 PROBLEM — E11.9 DIABETES MELLITUS, TYPE 2 (H): Status: RESOLVED | Noted: 2018-08-27 | Resolved: 2019-05-24

## 2019-05-24 PROCEDURE — 99213 OFFICE O/P EST LOW 20 MIN: CPT | Performed by: FAMILY MEDICINE

## 2019-05-24 RX ORDER — SULFAMETHOXAZOLE AND TRIMETHOPRIM 400; 80 MG/1; MG/1
1 TABLET ORAL 2 TIMES DAILY
Qty: 14 TABLET | Refills: 0 | Status: SHIPPED | OUTPATIENT
Start: 2019-05-24 | End: 2019-08-09

## 2019-05-24 ASSESSMENT — PATIENT HEALTH QUESTIONNAIRE - PHQ9: SUM OF ALL RESPONSES TO PHQ QUESTIONS 1-9: 0

## 2019-05-24 ASSESSMENT — MIFFLIN-ST. JEOR: SCORE: 2207.02

## 2019-05-24 NOTE — PROGRESS NOTES
"SUBJECTIVE:   Chief Complaint   Patient presents with     Derm Problem     Donna Elmore is a 59 year old female who presents for evaluation of and area of redness, tenderness, swelling and warmth of the skin that developed on the  right, dorsal hand.  Patient has had pain, red streaks and skin erythema (reddened skin) for >1 week.    Precipitating event was bug bite, .    Therapies tried: topical antibiotic.    Past Medical History:   Diagnosis Date     Abnormal glucose 2/1/17    \"mild DM\" per 2/1/17 lab note-Dr Cornell     Appendicitis 9/2015     Depression      Hepatitis A      HTN (hypertension)      Obese      Osteoarthritis of left knee      Plantar fasciitis      Shingles      SVT (supraventricular tachycardia) (H) 8/15/2012    1/20/2010 SVT ablation     Uterine cancer (H) 3/2010    + washings     Venous dermatitis        ALLERGIES:  Penicillins and Percocet [oxycodone-acetaminophen]      Current Outpatient Medications on File Prior to Visit:  albuterol (2.5 MG/3ML) 0.083% neb solution Take 1 vial (2.5 mg) by nebulization every 6 hours as needed (Patient not taking: Reported on 3/19/2018)   albuterol (PROAIR HFA/PROVENTIL HFA/VENTOLIN HFA) 108 (90 BASE) MCG/ACT Inhaler Inhale 2 puffs into the lungs every 6 hours as needed for shortness of breath / dyspnea or wheezing (Patient not taking: Reported on 8/27/2018)   ascorbic acid (VITAMIN C) 1000 MG TABS Take 1,000 mg by mouth daily   budesonide-formoterol (SYMBICORT) 80-4.5 MCG/ACT Inhaler Inhale 2 puffs into the lungs 2 times daily   Calcium Carbonate Antacid (TUMS PO) Take by mouth as needed    glucosamine-chondroitin 500-400 MG CAPS Take 1 capsule by mouth 2 times daily   ibuprofen (ADVIL) 200 MG tablet Take 200 mg by mouth every 4 hours as needed for mild pain   metoprolol succinate ER (TOPROL-XL) 100 MG 24 hr tablet TAKE 1 TABLET(100 MG) BY MOUTH DAILY   Probiotic Product (PROBIOTIC DAILY PO)    Spacer/Aero-Holding Chambers (AEROCHAMBER MINI CHAMBER) " "AMANDEEP 1 Units as needed (Patient not taking: Reported on 8/27/2018)   VITAMIN D, CHOLECALCIFEROL, PO Take 5,000 Units by mouth daily 1000units with Vitamin C 500mg - 3 tabs am & 2 tabs pm     No current facility-administered medications on file prior to visit.     Social History     Tobacco Use     Smoking status: Never Smoker     Smokeless tobacco: Never Used   Substance Use Topics     Alcohol use: No     Family History   Problem Relation Age of Onset     Heart Disease Mother         pacemaker, heart failure     Thyroid Disease Mother      Hypertension Mother      Cancer Father         kidney     Heart Disease Father      Hypertension Father      Thyroid Disease Sister      ROS:  CONSTITUTIONAL:NEGATIVE for fever, chills, change in weight  MUSCULOSKELETAL: NEGATIVE for significant arthralgias or myalgia    OBJECTIVE:  /85 (BP Location: Left arm, Patient Position: Sitting, Cuff Size: Adult Large)   Pulse 65   Ht 1.676 m (5' 6\")   Wt (!) 161 kg (355 lb)   BMI 57.30 kg/m      Skin area involved is 1 cm by 1 cm on the right,  Dorsal  wrist.   The skin appear erythematous, moderately swollen, with tenderness and warmth to the touch. Central ulceration with surrounding erythema  GENERAL APPEARANCE: healthy, alert and no distress  NECK: supple, non-tender to palpation, no adenopathy noted     ASSESSMENT:  Insect bite of right hand, initial encounter    - sulfamethoxazole-trimethoprim (BACTRIM/SEPTRA) 400-80 MG tablet; Take 1 tablet by mouth 2 times daily  Dispense: 14 tablet; Refill: 0     Use Tylenol or ibuprofen for pain or fever.    Call me in one week for progress report    "

## 2019-06-25 DIAGNOSIS — I10 ESSENTIAL HYPERTENSION: ICD-10-CM

## 2019-06-25 DIAGNOSIS — Z76.0 ENCOUNTER FOR MEDICATION REFILL: ICD-10-CM

## 2019-06-25 RX ORDER — METOPROLOL SUCCINATE 100 MG/1
TABLET, EXTENDED RELEASE ORAL
Qty: 90 TABLET | Refills: 0 | Status: SHIPPED | OUTPATIENT
Start: 2019-06-25 | End: 2019-08-09

## 2019-06-25 NOTE — TELEPHONE ENCOUNTER
Refill medication  Toporol XL 100mg  LOV 03/26/2019  Labs 08/27/2018  Elizabeth La MA on 6/25/2019 at 8:30 AM

## 2019-08-09 ENCOUNTER — OFFICE VISIT (OUTPATIENT)
Dept: FAMILY MEDICINE | Facility: CLINIC | Age: 59
End: 2019-08-09

## 2019-08-09 VITALS
WEIGHT: 293 LBS | SYSTOLIC BLOOD PRESSURE: 120 MMHG | DIASTOLIC BLOOD PRESSURE: 62 MMHG | HEART RATE: 52 BPM | OXYGEN SATURATION: 98 % | BODY MASS INDEX: 54.23 KG/M2

## 2019-08-09 DIAGNOSIS — I10 ESSENTIAL HYPERTENSION: ICD-10-CM

## 2019-08-09 DIAGNOSIS — E11.9 TYPE 2 DIABETES MELLITUS WITHOUT COMPLICATION, WITHOUT LONG-TERM CURRENT USE OF INSULIN (H): Primary | ICD-10-CM

## 2019-08-09 PROCEDURE — 99213 OFFICE O/P EST LOW 20 MIN: CPT | Performed by: FAMILY MEDICINE

## 2019-08-09 RX ORDER — METOPROLOL SUCCINATE 100 MG/1
100 TABLET, EXTENDED RELEASE ORAL DAILY
Qty: 90 TABLET | Refills: 3 | Status: SHIPPED | OUTPATIENT
Start: 2019-08-09 | End: 2020-09-22

## 2019-08-26 NOTE — PROGRESS NOTES
CHIEF COMPLAINT:   Chief Complaint   Patient presents with     Forms     FMLA        Patient with stable hx of dm2, psvt, htn and needing to have medications reviewed, renewed and some work-related paperwork completed.   Feeling well    ROS otherwise negative including sleep, neuro, CV, skin or GI     /62   Pulse 52   Wt (!) 152.4 kg (336 lb)   SpO2 98%   BMI 54.23 kg/m      GENERAL: healthy, alert and no distress  EYES: Eyes grossly normal to inspection, PERRL and conjunctivae and sclerae normal  RESP: lungs clear to auscultation - no rales, rhonchi or wheezes  CV: regular rate and rhythm, normal S1 S2, no S3 or S4, no murmur, click or rub, no peripheral edema and peripheral pulses strong  MS: no gross musculoskeletal defects noted, no edema  SKIN: no suspicious lesions or rashes  NEURO: Normal strength and tone, mentation intact and speech normal  PSYCH: mentation appears normal, affect normal/bright    ASSESSMENT:  1. Essential hypertension  bp at goal   Paperwork filled out  - metoprolol succinate ER (TOPROL-XL) 100 MG 24 hr tablet; Take 1 tablet (100 mg) by mouth daily  Dispense: 90 tablet; Refill: 3    2. Type 2 diabetes mellitus without complication, without long-term current use of insulin (H)  Labs updated for future  - Lipid Panel ([facility]); Future  - Basic Metabolic Panel (8) ([facility]); Future  - Hemoglobin A1C ([facility]); Future

## 2019-11-05 ENCOUNTER — TELEPHONE (OUTPATIENT)
Dept: FAMILY MEDICINE | Facility: CLINIC | Age: 59
End: 2019-11-05

## 2019-12-19 DIAGNOSIS — Z13.6 SCREENING FOR CARDIOVASCULAR CONDITION: ICD-10-CM

## 2019-12-19 DIAGNOSIS — I10 ESSENTIAL HYPERTENSION: Primary | ICD-10-CM

## 2019-12-19 DIAGNOSIS — E11.9 TYPE 2 DIABETES MELLITUS WITHOUT COMPLICATION, WITHOUT LONG-TERM CURRENT USE OF INSULIN (H): ICD-10-CM

## 2019-12-19 DIAGNOSIS — R73.09 ABNORMAL GLUCOSE TOLERANCE TEST: ICD-10-CM

## 2019-12-19 DIAGNOSIS — Z23 FLU VACCINE NEED: ICD-10-CM

## 2019-12-19 LAB
% GRANULOCYTES: 74.1 % (ref 42.2–75.2)
HCT VFR BLD AUTO: 43.8 % (ref 35–46)
HEMOGLOBIN: 14 G/DL (ref 11.8–15.5)
LYMPHOCYTES NFR BLD AUTO: 19.8 % (ref 20.5–51.1)
MCH RBC QN AUTO: 26.8 PG (ref 27–31)
MCHC RBC AUTO-ENTMCNC: 32 G/DL (ref 33–37)
MCV RBC AUTO: 83.9 FL (ref 80–100)
MONOCYTES NFR BLD AUTO: 6.1 % (ref 1.7–9.3)
PLATELET # BLD AUTO: 234 K/UL (ref 140–450)
RBC # BLD AUTO: 5.22 X10/CMM (ref 3.7–5.2)
WBC # BLD AUTO: 6.1 X10/CMM (ref 3.8–11)

## 2019-12-19 PROCEDURE — 82570 ASSAY OF URINE CREATININE: CPT | Performed by: FAMILY MEDICINE

## 2019-12-19 PROCEDURE — 82044 UR ALBUMIN SEMIQUANTITATIVE: CPT | Performed by: FAMILY MEDICINE

## 2019-12-19 PROCEDURE — 85025 COMPLETE CBC W/AUTO DIFF WBC: CPT | Performed by: FAMILY MEDICINE

## 2019-12-19 PROCEDURE — 90471 IMMUNIZATION ADMIN: CPT | Performed by: FAMILY MEDICINE

## 2019-12-19 PROCEDURE — 36415 COLL VENOUS BLD VENIPUNCTURE: CPT | Performed by: FAMILY MEDICINE

## 2019-12-19 PROCEDURE — 90674 CCIIV4 VAC NO PRSV 0.5 ML IM: CPT | Performed by: FAMILY MEDICINE

## 2019-12-19 NOTE — PROGRESS NOTES
fasting labs- needs cpx soon with pap & pt states she is not Diabetic- comp, lipid, cbc, a1c, BOSSMAN Skinner MA December 19, 2019 9:17 AM              Donna Elmore      1.  Has the patient received the information for the influenza vaccine? YES    2.  Does the patient have any of the following contraindications?     Allergy to eggs? No     Allergic reaction to previous influenza vaccines? No     Any other problems to previous influenza vaccines? No     Paralyzed by Guillain-Sharon syndrome? No     Currently pregnant? NO     Current moderate or severe illness? No     Allergy to contact lens solution? No    3.  The vaccine has been administered in the usual fashion and the patient was instructed to wait 20 minutes before leaving the building in the event of an allergic reaction: YES    Vaccination given by Danielle Skinner MA December 19, 2019 9:17 AM  .  Recorded by Danielle Skinner MA

## 2019-12-19 NOTE — LETTER
[facility] [address] [place]  [address]  Phone: [phone]    December 23, 2019      Donna Elmore  [address]  [facility] [address]              Dear Donna,    I am writing to report that your included test results are within expected ranges. I do not suggest that we make any changes at this time.        Sincerely,     Dennis Narayanan M.D.    Results for orders placed or performed in visit on 12/19/19   Microalbumin ([facility])     Status: None   Result Value Ref Range    Albumin mg/L 10 mg/L    Urine Creatinine Mg/Dl 100 mg/dL    A/C Ratio mg/g <30 mg/g    Interpretation normal    Hemoglobin A1C ([facility])     Status: Abnormal   Result Value Ref Range    Hemoglobin A1C 5.8 (H) 4.8 - 5.6 %   Comp. Metabolic Panel (14) ([facility])     Status: Abnormal   Result Value Ref Range    Glucose 98 65 - 99 mg/dL    Urea Nitrogen 20 6 - 24 mg/dL    Creatinine 0.79 0.57 - 1.00 mg/dL    eGFR If NonAfricn Am 83 >59 mL/min/1.73    eGFR If Africn Am 95 >59 mL/min/1.73    BUN/Creatinine Ratio 25 (H) 9 - 23    Sodium 141 134 - 144 mmol/L    Potassium 4.7 3.5 - 5.2 mmol/L    Chloride 102 96 - 106 mmol/L    Total CO2 27 20 - 29 mmol/L    Calcium 9.0 8.7 - 10.2 mg/dL    Protein Total 6.3 6.0 - 8.5 g/dL    Albumin 4.1 3.5 - 5.5 g/dL    Globulin, Total 2.2 1.5 - 4.5 g/dL    A/G Ratio 1.9 1.2 - 2.2    Bilirubin Total 0.5 0.0 - 1.2 mg/dL    Alkaline Phosphatase 69 39 - 117 IU/L    AST 11 0 - 40 IU/L    ALT 18 0 - 32 IU/L   Lipid Panel ([facility])     Status: None   Result Value Ref Range    Cholesterol 142 100 - 199 mg/dL    Triglycerides 42 0 - 149 mg/dL    HDL Cholesterol 59 >39 mg/dL    VLDL Cholesterol Chad 8 5 - 40 mg/dL    LDL Cholesterol Calculated 75 0 - 99 mg/dL    LDL/HDL Ratio 1.3 0.0 - 3.2 ratio   CBC with Diff/Plt ([facility])     Status: Abnormal   Result Value Ref Range    WBC x10/cmm 6.1 3.8 - 11.0 x10/cmm    % Lymphocytes 19.8 (A) 20.5 - 51.1 %    % Monocytes 6.1 1.7 - 9.3 %    % Granulocytes  74.1 42.2 - 75.2 %    RBC x10/cmm 5.22 (A) 3.7 - 5.2 x10/cmm    Hemoglobin 14.0 11.8 - 15.5 g/dl    Hematocrit 43.8 35 - 46 %    MCV 83.9 80 - 100 fL    MCH 26.8 (A) 27.0 - 31.0 pg    MCHC 32.0 (A) 33.0 - 37.0 g/dL    Platelet Count 234 140 - 450 K/uL

## 2019-12-20 LAB
A/C RATIO MG/G: <30 MG/G
ALBUMIN (URINE) MG/L: 10 MG/L
ALBUMIN SERPL-MCNC: 4.1 G/DL (ref 3.5–5.5)
ALBUMIN/GLOB SERPL: 1.9 {RATIO} (ref 1.2–2.2)
ALP SERPL-CCNC: 69 IU/L (ref 39–117)
ALT SERPL-CCNC: 18 IU/L (ref 0–32)
AST SERPL-CCNC: 11 IU/L (ref 0–40)
BILIRUB SERPL-MCNC: 0.5 MG/DL (ref 0–1.2)
BUN SERPL-MCNC: 20 MG/DL (ref 6–24)
BUN/CREATININE RATIO: 25 (ref 9–23)
CALCIUM SERPL-MCNC: 9 MG/DL (ref 8.7–10.2)
CHLORIDE SERPLBLD-SCNC: 102 MMOL/L (ref 96–106)
CHOLEST SERPL-MCNC: 142 MG/DL (ref 100–199)
CREAT SERPL-MCNC: 0.79 MG/DL (ref 0.57–1)
EGFR IF AFRICN AM: 95 ML/MIN/1.73
EGFR IF NONAFRICN AM: 83 ML/MIN/1.73
GLOBULIN, TOTAL: 2.2 G/DL (ref 1.5–4.5)
GLUCOSE SERPL-MCNC: 98 MG/DL (ref 65–99)
HDLC SERPL-MCNC: 59 MG/DL
INTERPRETATION: NORMAL
LDL/HDL RATIO: 1.3 RATIO (ref 0–3.2)
LDLC SERPL CALC-MCNC: 75 MG/DL (ref 0–99)
POTASSIUM SERPL-SCNC: 4.7 MMOL/L (ref 3.5–5.2)
PROT SERPL-MCNC: 6.3 G/DL (ref 6–8.5)
SODIUM SERPL-SCNC: 141 MMOL/L (ref 134–144)
TOTAL CO2: 27 MMOL/L (ref 20–29)
TRIGL SERPL-MCNC: 42 MG/DL (ref 0–149)
URINE CREATININE MG/DL - QUEST: 100 MG/DL
VLDLC SERPL CALC-MCNC: 8 MG/DL (ref 5–40)

## 2019-12-21 LAB — HBA1C MFR BLD: 5.8 % (ref 4.8–5.6)

## 2020-02-26 ENCOUNTER — OFFICE VISIT (OUTPATIENT)
Dept: CARDIOLOGY | Facility: CLINIC | Age: 60
End: 2020-02-26
Payer: COMMERCIAL

## 2020-02-26 VITALS
WEIGHT: 293 LBS | DIASTOLIC BLOOD PRESSURE: 84 MMHG | HEIGHT: 66 IN | HEART RATE: 52 BPM | SYSTOLIC BLOOD PRESSURE: 131 MMHG | OXYGEN SATURATION: 97 % | BODY MASS INDEX: 47.09 KG/M2

## 2020-02-26 DIAGNOSIS — I47.10 SVT (SUPRAVENTRICULAR TACHYCARDIA) (H): ICD-10-CM

## 2020-02-26 DIAGNOSIS — R60.0 LOCALIZED EDEMA: ICD-10-CM

## 2020-02-26 DIAGNOSIS — I10 ESSENTIAL HYPERTENSION: ICD-10-CM

## 2020-02-26 DIAGNOSIS — I10 BENIGN ESSENTIAL HYPERTENSION: Primary | ICD-10-CM

## 2020-02-26 PROCEDURE — 93000 ELECTROCARDIOGRAM COMPLETE: CPT | Performed by: INTERNAL MEDICINE

## 2020-02-26 PROCEDURE — 99204 OFFICE O/P NEW MOD 45 MIN: CPT | Performed by: INTERNAL MEDICINE

## 2020-02-26 RX ORDER — MULTIVITAMIN WITH IRON
1 TABLET ORAL DAILY
COMMUNITY

## 2020-02-26 RX ORDER — FUROSEMIDE 20 MG
20 TABLET ORAL DAILY
Qty: 30 TABLET | Refills: 1 | Status: SHIPPED | OUTPATIENT
Start: 2020-02-26 | End: 2020-10-06

## 2020-02-26 ASSESSMENT — MIFFLIN-ST. JEOR: SCORE: 2098.16

## 2020-02-26 NOTE — PROGRESS NOTES
"HPI and Plan:   See dictation    Orders Placed This Encounter   Procedures     US Venous Competency Bilateral     EKG 12-lead complete w/read - Clinics (performed today)       Orders Placed This Encounter   Medications     magnesium 250 MG tablet     Sig: Take 1 tablet by mouth daily     furosemide (LASIX) 20 MG tablet     Sig: Take 1 tablet (20 mg) by mouth daily     Dispense:  30 tablet     Refill:  1       There are no discontinued medications.      Encounter Diagnoses   Name Primary?     Benign essential hypertension Yes     Essential hypertension      SVT (supraventricular tachycardia) (H)      Localized edema        CURRENT MEDICATIONS:  Current Outpatient Medications   Medication Sig Dispense Refill     ascorbic acid (VITAMIN C) 1000 MG TABS Take 1,000 mg by mouth daily       furosemide (LASIX) 20 MG tablet Take 1 tablet (20 mg) by mouth daily 30 tablet 1     glucosamine-chondroitin 500-400 MG CAPS Take 1 capsule by mouth 2 times daily       ibuprofen (ADVIL) 200 MG tablet Take 200 mg by mouth every 4 hours as needed for mild pain       magnesium 250 MG tablet Take 1 tablet by mouth daily       metoprolol succinate ER (TOPROL-XL) 100 MG 24 hr tablet Take 1 tablet (100 mg) by mouth daily 90 tablet 3     Probiotic Product (PROBIOTIC DAILY PO)        VITAMIN D, CHOLECALCIFEROL, PO Take 5,000 Units by mouth daily 1000units with Vitamin C 500mg - 3 tabs am & 2 tabs pm       Calcium Carbonate Antacid (TUMS PO) Take by mouth as needed          ALLERGIES     Allergies   Allergen Reactions     Penicillins GI Disturbance     Percocet [Oxycodone-Acetaminophen]        PAST MEDICAL HISTORY:  Past Medical History:   Diagnosis Date     Abnormal glucose 2/1/17    \"mild DM\" per 2/1/17 lab note-Dr Cornell     Appendicitis 9/2015     Depression      Hepatitis A      HTN (hypertension)      Obese      Osteoarthritis of left knee      Plantar fasciitis      Shingles      SVT (supraventricular tachycardia) (H) 8/15/2012    " 1/20/2010 SVT ablation     Uterine cancer (H) 3/2010    + washings     Venous dermatitis        PAST SURGICAL HISTORY:  Past Surgical History:   Procedure Laterality Date     ------------OTHER-------------  7-2011    cyst removal - back     CATHETER, ABLATION  1-2010    tachycardia ablation     D & C  3-2010     HYSTERECTOMY RADICAL  3-2010     LAPAROSCOPIC APPENDECTOMY  9/27/2013    Procedure: LAPAROSCOPIC APPENDECTOMY;  LAPAROSCOPIC APPENDECTOMY;  Surgeon: Noreen Brambila MD;  Location:  OR       FAMILY HISTORY:  Family History   Problem Relation Age of Onset     Heart Disease Mother         pacemaker, heart failure     Thyroid Disease Mother      Hypertension Mother      Cancer Father         kidney     Heart Disease Father      Hypertension Father      Thyroid Disease Sister        SOCIAL HISTORY:  Social History     Socioeconomic History     Marital status:      Spouse name: None     Number of children: None     Years of education: None     Highest education level: None   Occupational History     None   Social Needs     Financial resource strain: None     Food insecurity:     Worry: None     Inability: None     Transportation needs:     Medical: None     Non-medical: None   Tobacco Use     Smoking status: Never Smoker     Smokeless tobacco: Never Used   Substance and Sexual Activity     Alcohol use: No     Drug use: No     Sexual activity: None   Lifestyle     Physical activity:     Days per week: None     Minutes per session: None     Stress: None   Relationships     Social connections:     Talks on phone: None     Gets together: None     Attends Sabianism service: None     Active member of club or organization: None     Attends meetings of clubs or organizations: None     Relationship status: None     Intimate partner violence:     Fear of current or ex partner: None     Emotionally abused: None     Physically abused: None     Forced sexual activity: None   Other Topics Concern      Service  "Not Asked     Blood Transfusions Not Asked     Caffeine Concern Yes     Comment: 2-3 Diet Coke      Occupational Exposure Not Asked     Hobby Hazards Not Asked     Sleep Concern Yes     Stress Concern Yes     Weight Concern Yes     Special Diet Not Asked     Back Care No     Exercise No     Bike Helmet Not Asked     Seat Belt Yes     Self-Exams Not Asked     Parent/sibling w/ CABG, MI or angioplasty before 65F 55M? Not Asked   Social History Narrative     None       Review of Systems:  Skin:  Negative       Eyes:  Positive for glasses    ENT:  Positive for tinnitus;hearing loss    Respiratory:  Negative       Cardiovascular:  chest pain;cyanosis;syncope or near-syncope;fatigue;lightheadedness;dizziness;exercise intolerance heaviness;edema;Positive for;palpitations    Gastroenterology: Negative      Genitourinary:  Positive for urinary frequency    Musculoskeletal:  Positive for joint pain    Neurologic:  Negative      Psychiatric:  Positive for excessive stress;sleep disturbances;anxiety;depression    Heme/Lymph/Imm:  Negative      Endocrine:  Negative        Physical Exam:  Vitals: /84 (BP Location: Left arm, Patient Position: Sitting, Cuff Size: Adult Large)   Pulse 52   Ht 1.676 m (5' 6\")   Wt (!) 150.1 kg (331 lb)   SpO2 97%   BMI 53.42 kg/m      Constitutional:  cooperative;in no acute distress morbidly obese      Skin:  warm and dry to the touch, no apparent skin lesions or masses noted          Head:  normocephalic        Eyes:           Lymph:      ENT:  no pallor or cyanosis, dentition good        Neck:  carotid pulses are full and equal bilaterally        Respiratory:  clear to auscultation;normal symmetry         Cardiac: regular rhythm;no murmurs, gallops or rubs detected                  pulses below the femoral arteries are diminished                                      GI:  abdomen soft;no bruits obese      Extremities and Muscular Skeletal:      1+;bilateral LE edema     varicose " veins    Neurological:  no gross motor deficits;affect appropriate        Psych:  Alert and Oriented x 3          CC  Dennis Narayanan MD  [address] [place] [address]

## 2020-02-26 NOTE — LETTER
2/26/2020      Dennis Narayanan MD  [address]  [facility] [place] [id number]      RE: Donna Elmore       Dear Colleague,    I had the pleasure of seeing Donna Elmore in the [facility].    Service Date: 02/26/2020      HISTORY OF PRESENT ILLNESS:  Ms. Elmore is a very pleasant 59-year-old female who I last saw back in 2015.  She has a history of hypertension, SVT and morbid obesity.  She is coming into clinic today because she has been experiencing significant swelling of her lower extremities.  She has been actively working on weight loss ever since I have met her, but it looks like she has had some fluctuations, but for the most part she is down about 20 pounds since I have seen her.  She has been having a lot of issues with her knees that have limited her activity, but she is attending therapy and pool therapy for this and is seeing some progress.        The swelling has been an issue now that has been kind of chronic and progressive for her.  She is currently not on any diuretic.  She has tried compression socks in the past, but they have been very difficult to get on and tight around the ankle.  She is researching a [facility] facility that may have another option for custom compression socks for her, but she was told to follow up with Cardiology to assess the etiology of her lower extremity edema.        Ms. Elmore has been tested for sleep apnea in the past when she was heavier and she did not have this.  Her last echocardiogram was from 2013 and she had normal LV and RV function.  She again had issues with SVT but nothing really since I saw her last.  She is maintained on Toprol 100 mg.        We did perform an electrocardiogram in office today.  This demonstrates a sinus bradycardia.  There is some sinus arrhythmia noted.  Her QT corrected interval is normal in ST segments are normal.      PHYSICAL EXAMINATION:   VITAL:  On exam, her blood pressure  is 131/84, pulse of 52, weight is 331 with a body mass index of 53.   CARDIOVASCULAR:  Tones are regular today with distant heart tones.  I do not appreciate a murmur, gallop or rub.   LUNGS:  Clear but diminished posteriorly, especially in the base.   ABDOMEN:  Obese and soft.   EXTREMITIES:  Demonstrate 1 to 2+ pitting edema to mid shin with some erythema stasis changes noted to both lower extremities.  She also has varicose veins noted, right greater than left.      SUMMARY:  Ms. Elmore is a very pleasant 59-year-old female with an underlying history of hypertension, supraventricular tachycardia and morbid obesity.  She has lower extremity edema that is likely multifactorial.  I would recommend that we do a venous insufficiency study on her given evidence of varicose veins and I would also encourage her to follow up with the [facility] for custom-made compression socks.  An alternative if this is unavailable to her would be ACE wraps if she cannot tolerate the compression socks and/or traveler socks, which are of lighter compression.        She is following and has been following a low-sodium diet, which she instituted when she was caregiving for her mother who had heart failure.        She will continue with physical therapy and exercise.  I also offered her low-dose Lasix to potentiate the fluid retention loss from her lower extremities.  She does have normal kidney function which was evaluated in 12/2018.   She is requesting this, so I gave her a prescription for furosemide 20 mg to take in the a.m.  I cautioned her about the increased diuresis and I also would like her to have a basic metabolic panel within 7-10 days after starting to ensure her electrolytes remain within normal range.  She will have this done at her clinic in [place].  I did write her a lab prescription for this.        We will follow up with results of the venous insufficiency studies and I may have her follow  up with one of our vascular specialists, Dr. Rich Vega, if this is abnormal.        Please feel free to contact me with any questions you have in regards to her care.      cc:      Dennis Narayanan MD    [facility]   [address] [place] [address]         RADHA RICHARDSON DO             D: 2020   T: 2020   MT:       Name:     DAYLIN CARTER   MRN:      [id number]        Account:      [id number]   :      1960           Service Date: 2020      Document: [id number]         Outpatient Encounter Medications as of 2020   Medication Sig Dispense Refill     ascorbic acid (VITAMIN C) 1000 MG TABS Take 1,000 mg by mouth daily       furosemide (LASIX) 20 MG tablet Take 1 tablet (20 mg) by mouth daily 30 tablet 1     glucosamine-chondroitin 500-400 MG CAPS Take 1 capsule by mouth 2 times daily       ibuprofen (ADVIL) 200 MG tablet Take 200 mg by mouth every 4 hours as needed for mild pain       magnesium 250 MG tablet Take 1 tablet by mouth daily       metoprolol succinate ER (TOPROL-XL) 100 MG 24 hr tablet Take 1 tablet (100 mg) by mouth daily 90 tablet 3     Probiotic Product (PROBIOTIC DAILY PO)        VITAMIN D, CHOLECALCIFEROL, PO Take 5,000 Units by mouth daily 1000units with Vitamin C 500mg - 3 tabs am & 2 tabs pm       Calcium Carbonate Antacid (TUMS PO) Take by mouth as needed        No facility-administered encounter medications on file as of 2020.        Again, thank you for allowing me to participate in the care of your patient.      Sincerely,    Radha Richardson DO     [facility]

## 2020-02-26 NOTE — PROGRESS NOTES
Service Date: 02/26/2020      HISTORY OF PRESENT ILLNESS:  Ms. Elmore is a very pleasant 59-year-old female who I last saw back in 2015.  She has a history of hypertension, SVT and morbid obesity.  She is coming into clinic today because she has been experiencing significant swelling of her lower extremities.  She has been actively working on weight loss ever since I have met her, but it looks like she has had some fluctuations, but for the most part she is down about 20 pounds since I have seen her.  She has been having a lot of issues with her knees that have limited her activity, but she is attending therapy and pool therapy for this and is seeing some progress.        The swelling has been an issue now that has been kind of chronic and progressive for her.  She is currently not on any diuretic.  She has tried compression socks in the past, but they have been very difficult to get on and tight around the ankle.  She is researching a [facility] facility that may have another option for custom compression socks for her, but she was told to follow up with Cardiology to assess the etiology of her lower extremity edema.        Ms. Elmore has been tested for sleep apnea in the past when she was heavier and she did not have this.  Her last echocardiogram was from 2013 and she had normal LV and RV function.  She again had issues with SVT but nothing really since I saw her last.  She is maintained on Toprol 100 mg.        We did perform an electrocardiogram in office today.  This demonstrates a sinus bradycardia.  There is some sinus arrhythmia noted.  Her QT corrected interval is normal in ST segments are normal.      PHYSICAL EXAMINATION:   VITAL:  On exam, her blood pressure is 131/84, pulse of 52, weight is 331 with a body mass index of 53.   CARDIOVASCULAR:  Tones are regular today with distant heart tones.  I do not appreciate a murmur, gallop or rub.   LUNGS:  Clear but diminished posteriorly,  especially in the base.   ABDOMEN:  Obese and soft.   EXTREMITIES:  Demonstrate 1 to 2+ pitting edema to mid shin with some erythema stasis changes noted to both lower extremities.  She also has varicose veins noted, right greater than left.      SUMMARY:  Ms. Elmore is a very pleasant 59-year-old female with an underlying history of hypertension, supraventricular tachycardia and morbid obesity.  She has lower extremity edema that is likely multifactorial.  I would recommend that we do a venous insufficiency study on her given evidence of varicose veins and I would also encourage her to follow up with the [facility] for custom-made compression socks.  An alternative if this is unavailable to her would be ACE wraps if she cannot tolerate the compression socks and/or traveler socks, which are of lighter compression.        She is following and has been following a low-sodium diet, which she instituted when she was caregiving for her mother who had heart failure.        She will continue with physical therapy and exercise.  I also offered her low-dose Lasix to potentiate the fluid retention loss from her lower extremities.  She does have normal kidney function which was evaluated in 12/2018.   She is requesting this, so I gave her a prescription for furosemide 20 mg to take in the a.m.  I cautioned her about the increased diuresis and I also would like her to have a basic metabolic panel within 7-10 days after starting to ensure her electrolytes remain within normal range.  She will have this done at her clinic in [place].  I did write her a lab prescription for this.        We will follow up with results of the venous insufficiency studies and I may have her follow up with one of our vascular specialists, Dr. Rich Vega, if this is abnormal.        Please feel free to contact me with any questions you have in regards to her care.      cc:      Dennis Narayanan MD    [facility]    [address] [place] [address]         ALEIDA RADER DO             D: 2020   T: 2020   MT: JO      Name:     DAYLIN CARTER   MRN:      [id number]        Account:      [id number]   :      1960           Service Date: 2020      Document: [id number]

## 2020-02-26 NOTE — LETTER
"2/26/2020    Dennis Narayanan MD  [address] [place] [address]    RE: Donna Elmore       Dear Colleague,    I had the pleasure of seeing Donna Elmore in the [facility].    HPI and Plan:   See dictation    Orders Placed This Encounter   Procedures     US Venous Competency Bilateral     EKG 12-lead complete w/read - Clinics (performed today)       Orders Placed This Encounter   Medications     magnesium 250 MG tablet     Sig: Take 1 tablet by mouth daily     furosemide (LASIX) 20 MG tablet     Sig: Take 1 tablet (20 mg) by mouth daily     Dispense:  30 tablet     Refill:  1       There are no discontinued medications.      Encounter Diagnoses   Name Primary?     Benign essential hypertension Yes     Essential hypertension      SVT (supraventricular tachycardia) (H)      Localized edema        CURRENT MEDICATIONS:  Current Outpatient Medications   Medication Sig Dispense Refill     ascorbic acid (VITAMIN C) 1000 MG TABS Take 1,000 mg by mouth daily       furosemide (LASIX) 20 MG tablet Take 1 tablet (20 mg) by mouth daily 30 tablet 1     glucosamine-chondroitin 500-400 MG CAPS Take 1 capsule by mouth 2 times daily       ibuprofen (ADVIL) 200 MG tablet Take 200 mg by mouth every 4 hours as needed for mild pain       magnesium 250 MG tablet Take 1 tablet by mouth daily       metoprolol succinate ER (TOPROL-XL) 100 MG 24 hr tablet Take 1 tablet (100 mg) by mouth daily 90 tablet 3     Probiotic Product (PROBIOTIC DAILY PO)        VITAMIN D, CHOLECALCIFEROL, PO Take 5,000 Units by mouth daily 1000units with Vitamin C 500mg - 3 tabs am & 2 tabs pm       Calcium Carbonate Antacid (TUMS PO) Take by mouth as needed          ALLERGIES     Allergies   Allergen Reactions     Penicillins GI Disturbance     Percocet [Oxycodone-Acetaminophen]        PAST MEDICAL HISTORY:  Past Medical History:   Diagnosis Date     Abnormal glucose 2/1/17    \"mild DM\" per 2/1/17 lab note- " [facility]     Appendicitis 9/2015     Depression      Hepatitis A      HTN (hypertension)      Obese      Osteoarthritis of left knee      Plantar fasciitis      Shingles      SVT (supraventricular tachycardia) (H) 8/15/2012    1/20/2010 SVT ablation     Uterine cancer (H) 3/2010    + washings     Venous dermatitis        PAST SURGICAL HISTORY:  Past Surgical History:   Procedure Laterality Date     ------------OTHER-------------  7-2011    cyst removal - back     CATHETER, ABLATION  1-2010    tachycardia ablation     D & C  3-2010     HYSTERECTOMY RADICAL  3-2010     LAPAROSCOPIC APPENDECTOMY  9/27/2013    Procedure: LAPAROSCOPIC APPENDECTOMY;  LAPAROSCOPIC APPENDECTOMY;  Surgeon: Noreen Brambila MD;  Location: [facility] OR       FAMILY HISTORY:  Family History   Problem Relation Age of Onset     Heart Disease Mother         pacemaker, heart failure     Thyroid Disease Mother      Hypertension Mother      Cancer Father         kidney     Heart Disease Father      Hypertension Father      Thyroid Disease Sister        SOCIAL HISTORY:  Social History     Socioeconomic History     Marital status:      Spouse name: None     Number of children: None     Years of education: None     Highest education level: None   Occupational History     None   Social Needs     Financial resource strain: None     Food insecurity:     Worry: None     Inability: None     Transportation needs:     Medical: None     Non-medical: None   Tobacco Use     Smoking status: Never Smoker     Smokeless tobacco: Never Used   Substance and Sexual Activity     Alcohol use: No     Drug use: No     Sexual activity: None   Lifestyle     Physical activity:     Days per week: None     Minutes per session: None     Stress: None   Relationships     Social connections:     Talks on phone: None     Gets together: None     Attends Worship service: None     Active member of club or organization: None     Attends meetings of clubs or organizations: None      "Relationship status: None     Intimate partner violence:     Fear of current or ex partner: None     Emotionally abused: None     Physically abused: None     Forced sexual activity: None   Other Topics Concern      Service Not Asked     Blood Transfusions Not Asked     Caffeine Concern Yes     Comment: 2-3 Diet Coke      Occupational Exposure Not Asked     Hobby Hazards Not Asked     Sleep Concern Yes     Stress Concern Yes     Weight Concern Yes     Special Diet Not Asked     Back Care No     Exercise No     Bike Helmet Not Asked     Seat Belt Yes     Self-Exams Not Asked     Parent/sibling w/ CABG, MI or angioplasty before 65F 55M? Not Asked   Social History Narrative     None       Review of Systems:  Skin:  Negative       Eyes:  Positive for glasses    ENT:  Positive for tinnitus;hearing loss    Respiratory:  Negative       Cardiovascular:  chest pain;cyanosis;syncope or near-syncope;fatigue;lightheadedness;dizziness;exercise intolerance heaviness;edema;Positive for;palpitations    Gastroenterology: Negative      Genitourinary:  Positive for urinary frequency    Musculoskeletal:  Positive for joint pain    Neurologic:  Negative      Psychiatric:  Positive for excessive stress;sleep disturbances;anxiety;depression    Heme/Lymph/Imm:  Negative      Endocrine:  Negative        Physical Exam:  Vitals: /84 (BP Location: Left arm, Patient Position: Sitting, Cuff Size: Adult Large)   Pulse 52   Ht 1.676 m (5' 6\")   Wt (!) 150.1 kg (331 lb)   SpO2 97%   BMI 53.42 kg/m       Constitutional:  cooperative;in no acute distress morbidly obese      Skin:  warm and dry to the touch, no apparent skin lesions or masses noted          Head:  normocephalic        Eyes:           Lymph:      ENT:  no pallor or cyanosis, dentition good        Neck:  carotid pulses are full and equal bilaterally        Respiratory:  clear to auscultation;normal symmetry         Cardiac: regular rhythm;no murmurs, gallops or rubs " detected                  pulses below the femoral arteries are diminished                                      GI:  abdomen soft;no bruits obese      Extremities and Muscular Skeletal:      1+;bilateral LE edema     varicose veins    Neurological:  no gross motor deficits;affect appropriate        Psych:  Alert and Oriented x 3          CC  Dennis Narayanan MD  [address] [place] [address]                    Thank you for allowing me to participate in the care of your patient.      Sincerely,     Radha Richardson, [facility]    cc:   Dennis Narayanan MD  [address] [place] [address]

## 2020-07-15 ENCOUNTER — OFFICE VISIT (OUTPATIENT)
Dept: FAMILY MEDICINE | Facility: CLINIC | Age: 60
End: 2020-07-15

## 2020-07-15 VITALS
WEIGHT: 293 LBS | HEIGHT: 66 IN | SYSTOLIC BLOOD PRESSURE: 128 MMHG | HEART RATE: 74 BPM | OXYGEN SATURATION: 94 % | BODY MASS INDEX: 47.09 KG/M2 | RESPIRATION RATE: 16 BRPM | DIASTOLIC BLOOD PRESSURE: 78 MMHG | TEMPERATURE: 98.4 F

## 2020-07-15 DIAGNOSIS — F33.0 MAJOR DEPRESSIVE DISORDER, RECURRENT EPISODE, MILD (H): Primary | ICD-10-CM

## 2020-07-15 PROCEDURE — 36415 COLL VENOUS BLD VENIPUNCTURE: CPT | Performed by: NURSE PRACTITIONER

## 2020-07-15 PROCEDURE — 84479 ASSAY OF THYROID (T3 OR T4): CPT | Mod: 90 | Performed by: NURSE PRACTITIONER

## 2020-07-15 PROCEDURE — 84443 ASSAY THYROID STIM HORMONE: CPT | Mod: 90 | Performed by: NURSE PRACTITIONER

## 2020-07-15 PROCEDURE — 99214 OFFICE O/P EST MOD 30 MIN: CPT | Performed by: NURSE PRACTITIONER

## 2020-07-15 PROCEDURE — 84436 ASSAY OF TOTAL THYROXINE: CPT | Mod: 90 | Performed by: NURSE PRACTITIONER

## 2020-07-15 RX ORDER — ESCITALOPRAM OXALATE 10 MG/1
TABLET ORAL
Qty: 60 TABLET | Refills: 0 | Status: SHIPPED | OUTPATIENT
Start: 2020-07-15 | End: 2020-07-29 | Stop reason: ALTCHOICE

## 2020-07-15 ASSESSMENT — ANXIETY QUESTIONNAIRES
GAD7 TOTAL SCORE: 15
5. BEING SO RESTLESS THAT IT IS HARD TO SIT STILL: MORE THAN HALF THE DAYS
IF YOU CHECKED OFF ANY PROBLEMS ON THIS QUESTIONNAIRE, HOW DIFFICULT HAVE THESE PROBLEMS MADE IT FOR YOU TO DO YOUR WORK, TAKE CARE OF THINGS AT HOME, OR GET ALONG WITH OTHER PEOPLE: EXTREMELY DIFFICULT
1. FEELING NERVOUS, ANXIOUS, OR ON EDGE: NEARLY EVERY DAY
2. NOT BEING ABLE TO STOP OR CONTROL WORRYING: MORE THAN HALF THE DAYS
6. BECOMING EASILY ANNOYED OR IRRITABLE: MORE THAN HALF THE DAYS
3. WORRYING TOO MUCH ABOUT DIFFERENT THINGS: MORE THAN HALF THE DAYS
7. FEELING AFRAID AS IF SOMETHING AWFUL MIGHT HAPPEN: SEVERAL DAYS

## 2020-07-15 ASSESSMENT — PATIENT HEALTH QUESTIONNAIRE - PHQ9
5. POOR APPETITE OR OVEREATING: NEARLY EVERY DAY
SUM OF ALL RESPONSES TO PHQ QUESTIONS 1-9: 16

## 2020-07-15 ASSESSMENT — MIFFLIN-ST. JEOR: SCORE: 2183.88

## 2020-07-15 NOTE — LETTER
July 20, 2020      Donna Elmore  [address] [facility] [address]        Angelique Thompson,       Your thyroid labs came back within normal range. Have you had the opportunity to make an appointment with a therapist? Please let me know if you need assistance with this process or if you have any questions for me. [phone].     Resulted Orders   Thyroid Panel With TSH ([facility])   Result Value Ref Range    TSH 3.490 0.450 - 4.500 uIU/mL    T4 Total 8.8 4.5 - 12.0 ug/dL    T3 Uptake 27 24 - 39 %    Free Thyroxine Index 2.4 1.2 - 4.9    Narrative    Performed at:  01 - [facility] [address], [place], [place]  [id number]  : Sai Bailey MD, Phone:  [phone]       If you have any questions or concerns, please call the clinic at the number listed above.       Sincerely,        Chrissy Viera NP
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
## 2020-07-15 NOTE — PATIENT INSTRUCTIONS
"[facility]   [facility]  [address] [place] [address]  [address]  [place] [address]    [phone]    Patient Education     Tips for Sleep Hygiene  \"Sleep hygiene\" means having good sleep habits.Follow these tips to sleep better at night:     Get on a schedule. Go to bed and get up at about the same time every day.    Listen to your body. Only try to sleep when you actually feel tired or sleepy.    Be patient. If you haven't been able to get to sleep after about 30 minutes or more, get up and do something calming or boring until you feel sleepy. Then return to bed and try again.    Don't have caffeine (coffee, tea, cola drinks, chocolate and some medicines), alcohol or nicotine (cigarettes). These can make it harder for you to fall asleep and stay asleep.    Use your bed for sleeping only. That means no TV, computer or homework in bed, especially during the evening and before bedtime.    Don't nap during the day. If you must nap, make sure it is for less than 20 minutes.    Create sleep rituals that remind your body it is time to sleep. Examples include breathing exercises, stretching or reading a book.    Avoid all electronic media (smart phone, computer, tablet) within 2 hours of bed time. The \"blue light\" in these devices activates the part of the brain that keeps you awake.    Dim the lights at night.    Get early morning sources of light (walk in the sunshine) to help set sleep patterns at night.    Try a bath or shower before bed. Having a warm bath 1 to 2 hours before bedtime can help you feel sleepy. Hot baths can make you alert, so be mindful of the temperature.    Don't watch the clock. Checking the clock during the night can wake you up. It can also lead to negative thoughts such as, \"I will never fall asleep,\" which can increase anxiety and sleeplessness.    Use a sleep diary. Track your sleep schedule to know your sleep patterns and to see where you can improve.    Get regular exercise " every day. Try not to do heavy exercise in the 4 hours before bedtime.    Eat a healthy, balanced diet.    Try eating a light, healthy snack before bed, but avoid eating a heavy meal.    Create the right sleeping area. A cool, dark, quiet room is best. If needed, try earplugs, fans and blackout curtains.    Keep your daytime routine the same even if you have a bad night sleep. Avoiding activities the next day can make it harder to sleep.  For informational purposes only. Not to replace the advice of your health care provider.   Copyright   2013 [employer]. All rights reserved. [employer] [id number] - 01/16.  For informational purposes only. Not to replace the advice of your health care provider.  Copyright   2018 [employer]. All rights reserved.

## 2020-07-16 LAB
FT4I SERPL CALC-MCNC: 2.4 (ref 1.2–4.9)
T3RU NFR SERPL: 27 % (ref 24–39)
T4 TOTAL: 8.8 UG/DL (ref 4.5–12)
TSH BLD-ACNC: 3.49 UIU/ML (ref 0.45–4.5)

## 2020-07-16 ASSESSMENT — ANXIETY QUESTIONNAIRES: GAD7 TOTAL SCORE: 15

## 2020-07-20 ENCOUNTER — TELEPHONE (OUTPATIENT)
Dept: FAMILY MEDICINE | Facility: CLINIC | Age: 60
End: 2020-07-20

## 2020-07-20 NOTE — TELEPHONE ENCOUNTER
Called patient with lab results. Informed her of normal thyroid labs. Asked patient if she has made an appointment yet with therapist and she says no she has not tried yet.   Informed her to call if she has any problems in scheduling.

## 2020-07-29 ENCOUNTER — VIRTUAL VISIT (OUTPATIENT)
Dept: FAMILY MEDICINE | Facility: CLINIC | Age: 60
End: 2020-07-29

## 2020-07-29 DIAGNOSIS — F33.0 MAJOR DEPRESSIVE DISORDER, RECURRENT EPISODE, MILD (H): Primary | ICD-10-CM

## 2020-07-29 PROCEDURE — 99214 OFFICE O/P EST MOD 30 MIN: CPT | Mod: 95 | Performed by: NURSE PRACTITIONER

## 2020-07-29 NOTE — LETTER
My Depression Action Plan  Name: Donna Elmore   Date of Birth 1960  Date: 7/29/2020    My doctor: Dennis Narayanan   My clinic: [facility] [facility] [address] [place] [address] [phone]          GREEN    ZONE   Good Control    What it looks like:     Things are going generally well. You have normal ups and downs. You may even feel depressed from time to time, but bad moods usually last less than a day.   What you need to do:  1. Continue to care for yourself (see self care plan)  2. Check your depression survival kit and update it as needed  3. Follow your physician s recommendations including any medication.  4. Do not stop taking medication unless you consult with your physician first.           YELLOW         ZONE Getting Worse    What it looks like:     Depression is starting to interfere with your life.     It may be hard to get out of bed; you may be starting to isolate yourself from others.    Symptoms of depression are starting to last most all day and this has happened for several days.     You may have suicidal thoughts but they are not constant.   What you need to do:     1. Call your care team. Your response to treatment will improve if you keep your care team informed of your progress. Yellow periods are signs an adjustment may need to be made.     2. Continue your self-care.  Just get dressed and ready for the day.  Don't give yourself time to talk yourself out of it.    3. Talk to someone in your support network.    4. Open up your Depression Self-Care Plan/Wellness Kit.           RED    ZONE Medical Alert - Get Help    What it looks like:     Depression is seriously interfering with your life.     You may experience these or other symptoms: You can t get out of bed most days, can t work or engage in other necessary activities, you have trouble taking care of basic hygiene, or basic responsibilities, thoughts of suicide or death that will not go  away, self-injurious behavior.     What you need to do:  1. Call your care team and request a same-day appointment. If they are not available (weekends or after hours) call your local crisis line, emergency room or 911.            Depression Self-Care Plan / Wellness Kit    Self-Care for Depression  Here s the deal. Your body and mind are really not as separate as most people think.  What you do and think affects how you feel and how you feel influences what you do and think. This means if you do things that people who feel good do, it will help you feel better.  Sometimes this is all it takes.  There is also a place for medication and therapy depending on how severe your depression is, so be sure to consult with your medical provider and/ or Behavioral Health Consultant if your symptoms are worsening or not improving.     In order to better manage my stress, I will:    Exercise  Get some form of exercise, every day. This will help reduce pain and release endorphins, the  feel good  chemicals in your brain. This is almost as good as taking antidepressants!  This is not the same as joining a gym and then never going! (they count on that by the way ) It can be as simple as just going for a walk or doing some gardening, anything that will get you moving.      Hygiene   Maintain good hygiene (get out of bed in the morning, make your bed, brush your teeth, take a shower, and get dressed like you were going to work, even if you are unemployed).  If your clothes don't fit try to get ones that do.    Diet  Strive to eat foods that are good for me, drink plenty of water, and avoid excessive sugar, caffeine, alcohol, and other mood-altering substances.  Some foods that are helpful in depression are: complex carbohydrates, B vitamins, flaxseed, fish or fish oil, fresh fruits and vegetables.    Psychotherapy  Agree to participate in Individual Therapy (if recommended).    Medication  If prescribed medications, I agree to take  them.  Missing doses can result in serious side effects.  I understand that drinking alcohol, or other illicit drug use, may cause potential side effects.  I will not stop my medication abruptly without first discussing it with my provider.    Staying Connected With Others  Stay in touch with my friends, family members, and my primary care provider/team.    Use your imagination  Be creative.  We all have a creative side; it doesn t matter if it s oil painting, sand castles, or mud pies! This will also kick up the endorphins.    Witness Beauty  (AKA stop and smell the roses) Take a look outside, even in mid-winter. Notice colors, textures. Watch the squirrels and birds.     Service to others  Be of service to others.  There is always someone else in need.  By helping others we can  get out of ourselves  and remember the really important things.  This also provides opportunities for practicing all the other parts of the program.    Humor  Laugh and be silly!  Adjust your TV habits for less news and crime-drama and more comedy.    Control your stress  Try breathing deep, massage therapy, biofeedback, and meditation. Find time to relax each day.     Crisis Text Line  http://www.crisistextline.org    The Crisis Text Line serves anyone, in any type of crisis, providing access to free, 24/7 support and information via the medium people already use and trust:    Here's how it works:  1.  Text 172-681 from anywhere in the [place], anytime, about any type of crisis.  2.  A live, trained Crisis Counselor receives the text and responds quickly.  3.  The volunteer Crisis Counselor will help you move from a 'hot moment to a cool moment'.    My support system    Clinic Contact:  Phone number:    Contact 1:  Phone number:    Contact 2:  Phone number:    Confucianist/:  Phone number:    Therapist:  Phone number:    Local crisis center:    Phone number:    Other community support:  Phone number:

## 2020-07-29 NOTE — PROGRESS NOTES
Problem(s) Oriented visit        SUBJECTIVE:                                                    Donna Elmore is a 60 year old female who presents to clinic today for the following health issues :    Patient was seen on 07/15/2020 for depression, prescribed 5 mg or escitalopram with the plan to increase to 10 mg. Pt reports she is still taking half a tablet (5 mg) of the medication because it has been causing extreme fatigue. She reports increasing to 10 mg caused too much sedation. She describes the side effect as intolerable and would like to try another medication.     Donna continues to have swelling in her lower legs she reports she is addressing this with her cardiologist. She reports having a visits scheduled with her MD.       Problem list, Medication list, Allergies, and Medical/Social/Surgical histories reviewed in EPIC and updated as appropriate.   Additional history: as documented    ROS:  5 point ROS completed and negative except noted above, including Gen, CV, Resp, GI, MS    Histories:   Patient Active Problem List   Diagnosis     Uterine cancer (H)     SVT (supraventricular tachycardia) (H)     Major depressive disorder, recurrent episode, mild (H)     Osteoarthritis of left knee     HTN (hypertension)     Morbid obesity (H)     Past Surgical History:   Procedure Laterality Date     ------------OTHER-------------  7-2011    cyst removal - back     CATHETER, ABLATION  1-2010    tachycardia ablation     D & C  3-2010     HYSTERECTOMY RADICAL  3-2010     LAPAROSCOPIC APPENDECTOMY  9/27/2013    Procedure: LAPAROSCOPIC APPENDECTOMY;  LAPAROSCOPIC APPENDECTOMY;  Surgeon: Noreen Brambila MD;  Location:  OR       Social History     Tobacco Use     Smoking status: Never Smoker     Smokeless tobacco: Never Used   Substance Use Topics     Alcohol use: No     Family History   Problem Relation Age of Onset     Heart Disease Mother         pacemaker, heart failure     Thyroid Disease Mother      Hypertension  Mother      Cancer Father         kidney     Heart Disease Father      Hypertension Father      Thyroid Disease Sister          Current Outpatient Medications   Medication Sig Dispense Refill     ascorbic acid (VITAMIN C) 1000 MG TABS Take 1,000 mg by mouth daily       Calcium Carbonate Antacid (TUMS PO) Take by mouth as needed        ELDERBERRY PO        glucosamine-chondroitin 500-400 MG CAPS Take 1 capsule by mouth 2 times daily       ibuprofen (ADVIL) 200 MG tablet Take 200 mg by mouth every 4 hours as needed for mild pain       magnesium 250 MG tablet Take 1 tablet by mouth daily       metoprolol succinate ER (TOPROL-XL) 100 MG 24 hr tablet Take 1 tablet (100 mg) by mouth daily 90 tablet 3     Multiple Vitamins-Minerals (AIRBORNE PO)        Probiotic Product (PROBIOTIC DAILY PO)        sertraline (ZOLOFT) 50 MG tablet Take 1 tablet (50 mg) by mouth daily 30 tablet 3     VITAMIN D, CHOLECALCIFEROL, PO Take 5,000 Units by mouth daily 1000units with Vitamin C 500mg - 3 tabs am & 2 tabs pm       Zinc 50 MG CAPS        furosemide (LASIX) 20 MG tablet Take 1 tablet (20 mg) by mouth daily (Patient not taking: Reported on 7/15/2020) 30 tablet 1       OBJECTIVE:                                                    There were no vitals taken for this visit.  There is no height or weight on file to calculate BMI.   APPEARANCE: = healthy, alert and mild distress  Conj/Eyelids = noninjected and lids and lashes are without inflammation  Ears/Nose = External structures and Nares have usual shape and form  Resp effort = Calm regular breathing  SKIN = absent significant rashes or lesions   Recent/Remote Memory = Alert and Oriented x 3  Mood/Affect = Cooperative and interested     ASSESSMENT/PLAN:                                                        Donna was seen today for depression.    Diagnoses and all orders for this visit:    Major depressive disorder, recurrent episode, mild (H)  -     DEPRESSION ACTION PLAN (DAP)  -      sertraline (ZOLOFT) 50 MG tablet; Take 1 tablet (50 mg) by mouth daily    We discussed the length of time for a proper drug trial and discussed she is likely to experience some side-effects for the first three to four weeks, after this time period most side effects resolve, please call with intolerable side effects or side effects which have lasted for five weeks or longer. Provided education about risks, benefits, common side effects, and alternative treatment options, this plan was created with patient using shared decision making and patient has given informed consent for treatment.       Patient needs assistance with ADLs: none identified today  Patient needs assistance with iADLs: none identified today    The following health maintenance items are reviewed in Epic and correct as of today:  Health Maintenance   Topic Date Due     PREVENTIVE CARE VISIT  1960     DIABETIC FOOT EXAM  1960     ADVANCE CARE PLANNING  1960     EYE EXAM  1960     COLORECTAL CANCER SCREENING  03/17/1971     HIV SCREENING  03/17/1976     PNEUMOCOCCAL IMMUNIZATION 19-64 MEDIUM RISK (1 of 1 - PPSV23) 03/17/1980     PAP  03/17/1982     MAMMO SCREENING  03/28/2013     ZOSTER IMMUNIZATION (2 of 2) 10/22/2018     A1C  06/19/2020     INFLUENZA VACCINE (1) 09/01/2020     LIPID  12/19/2020     MICROALBUMIN  12/19/2020     PHQ-9  01/15/2021     BMP  08/13/2021     DTAP/TDAP/TD IMMUNIZATION (3 - Td) 08/27/2028     HEPATITIS C SCREENING  Completed     DEPRESSION ACTION PLAN  Completed     HEPATITIS B IMMUNIZATION  Completed     IPV IMMUNIZATION  Aged Out     MENINGITIS IMMUNIZATION  Aged Out       CONSULTATION/REFERRAL to Cardiologist   Regular exercise    Chrissy Viera NP  [facility]  Family Practice  [facility]  [phone]    For any issues my office # is [phone]

## 2020-07-29 NOTE — TELEPHONE ENCOUNTER
"Patient left message with update on escitalopram ordered at 7/15/20 visit with Chrissy Viera CNP. Reports struggling with sedation on this med. Did not titrate up on dose yet, still taking 5mg (1/2 tab) before bed and it is \"knocking me out\". Hard time getting out of be in AM - some days in bed till noon. States was already very tired with the depression. Wonders if needs different med.   Plan: routed to Chrissy Viera CNP for review.  Alicia Brennan RN       "

## 2020-08-03 ENCOUNTER — TELEPHONE (OUTPATIENT)
Dept: CARDIOLOGY | Facility: CLINIC | Age: 60
End: 2020-08-03

## 2020-08-03 DIAGNOSIS — R60.0 LOCALIZED EDEMA: ICD-10-CM

## 2020-08-03 DIAGNOSIS — I10 BENIGN ESSENTIAL HYPERTENSION: ICD-10-CM

## 2020-08-03 DIAGNOSIS — R06.02 SOB (SHORTNESS OF BREATH): Primary | ICD-10-CM

## 2020-08-03 NOTE — TELEPHONE ENCOUNTER
Pt called to report that she has been experiencing some SOB, a cough, and some swelling in her ankles.  Pt has appt scheduled with Dr. Richardson on 8/26/20.  Pt's mother also was a pt of Dr. Richardson and had CHF.   Pt reports that she thinks her cough may be more of an allergy because it comes and goes.   Pt reports that she slept in her recliner over the weekend because she was having issues with SOB.   Pt reports that she was prescribed lasix 20mg daily when she last saw Dr. Richardson on 2/26/20, but she never started taking that medication.   Pt said that she just had the lasix filled. Informed pt that she should take the lasix today.  Pt last had an echo in 2013 and last BMP was on 12/19/19.   Pt's symptoms will be reviewed with Dr. Richardson and see what her recommendations are.

## 2020-08-04 NOTE — TELEPHONE ENCOUNTER
Reviewed pt's symptoms with Dr. Richardson and she recommended that pt have an echo and a BMP done. Pt should have BMP done 1 week after starting the lasix 20mg daily.   Dr. Richardson would also like pt to have the bilateral lower extremity US for venous competency completed- pt is checking with insurance about coverage.   Pt reports that she weighed herself this morning and she realized that she has gained 14 lbs since the end of June to the end of July.   Pt will continue taking the lasix daily and will update us if her weight does not decrease or if her symptoms worsen.   Scheduling will call pt to get echo and BMP set up for sometime next week.   Pt gave verbal understanding.

## 2020-08-05 ENCOUNTER — VIRTUAL VISIT (OUTPATIENT)
Dept: FAMILY MEDICINE | Facility: OTHER | Age: 60
End: 2020-08-05

## 2020-08-05 DIAGNOSIS — Z20.822 SUSPECTED 2019 NOVEL CORONAVIRUS INFECTION: Primary | ICD-10-CM

## 2020-08-05 NOTE — PROGRESS NOTES
"Date: 2020 13:06:39  Clinician: Eliel Carson  Clinician NPI: [id number]  Patient: Donna Elmore  Patient : 1960  Patient Address: [address] [place] [id number]  Patient Phone: [phone]  Visit Protocol: URI  Patient Summary:  Donna is a 60 year old ( : 1960 ) female who initiated a Visit for COVID-19 (Coronavirus) evaluation and screening. When asked the question \"Please sign me up to receive news, health information and promotions. \", Donna responded \"No\".    Donna states her symptoms started gradually 2-3 weeks ago.   Her symptoms consist of a cough, rhinitis, malaise, a headache, myalgia, facial pain or pressure, and wheezing. She is experiencing mild difficulty breathing with activities but can speak normally in full sentences.   Symptom details     Nasal secretions: The color of her mucus is clear.    Cough: Donna coughs every 5-10 minutes and her cough is more bothersome at night. Phlegm comes into her throat when she coughs. She believes her cough is caused by post-nasal drip. The color of the phlegm is clear.     Wheezing: Donna has not ever been diagnosed with asthma. Additional wheezing details as reported by the patient (free text): I have wheezing that causes me to cough.       Facial pain or pressure: The facial pain or pressure feels worse when bending over or leaning forward.     Headache: She states the headache is mild (1-3 on a 10 point pain scale).      Donna denies having nasal congestion, vomiting, chills, nausea, sore throat, teeth pain, ageusia, diarrhea, ear pain, anosmia, and fever. She also denies taking antibiotic medication in the past month, double sickening (worsening symptoms after initial improvement), and having recent facial or sinus surgery in the past 60 days.   Precipitating events  She has not recently been exposed to someone with influenza. Donna has been in close contact with the following high risk individuals: people with asthma, heart " disease or diabetes.   Pertinent COVID-19 (Coronavirus) information  In the past 14 days, Donna has not worked in a congregate living setting.   She does not work or volunteer as healthcare worker or a  and does not work or volunteer in a healthcare facility.   Donna also has not lived in a congregate living setting in the past 14 days. She does not live with a healthcare worker.   Donna has not had a close contact with a laboratory-confirmed COVID-19 patient within 14 days of symptom onset.   Since December 2019, Donna and has not had upper respiratory infection or influenza-like illness. Has not been diagnosed with lab-confirmed COVID-19 test   Pertinent medical history  Donna typically gets a yeast infection when she takes antibiotics. She has used fluconazole (Diflucan) to treat previous yeast infections. 2 doses of fluconazole (Diflucan) has typically been needed for symptoms to resolve in the past.  Donna does not need a return to work/school note.   Weight: 351 lbs   Donna does not smoke or use smokeless tobacco.   Additional information as reported by the patient (free text): I have had no fevers, but been excessively tired and sleeping lots which I thought was from depression and sleep issues. My cough is triggered by the noise from wheezing. It's not like bronchitis or flu or pneumonia, which I've had all of these during the past 4 years.   My sister is  and we have been together often. My  has diabetes and is cancer survivor from 5 years ago. I'm concerned about possibly exposing them if what I have is more than an allergy or cold.   Weight: 351 lbs    MEDICATIONS: sertraline oral, furosemide oral, metoprolol succinate oral, ALLERGIES: NKDA  Clinician Response:  Dear Donna,  Based on the information provided, you have viral bronchitis, also known as a chest cold. This is a cough that occurs when a cold or other virus settles into your chest. The cough may be dry or you could  notice you are coughing up some phlegm.  It is not unusual for a cough to last 3 weeks or more. Treatment focuses on controlling your symptoms as much as possible while you recover.  Medication information  Because you have a viral infection, antibiotics will not help you get better. Treating a viral infection with antibiotics could actually make you feel worse.  I am prescribing:       Benzonatate (Tessalon Perles) 100 mg oral capsule. Take 1-2 capsules by mouth 3 times per day as needed for your cough. There are no refills with this prescription.      Atrovent HFA 17 mcg/actuation aerosol inhaler. Inhale 2 puffs 4 times per day for 5 days (maximum dose: 12 inhalations in 24 hours). There are no refills with this prescription.     Self care  Steps you can take to be as comfortable as possible:     Rest.    Drink plenty of fluids.    Take a warm shower to loosen congestion    Use a cool-mist humidifier.    Take a spoonful of honey to reduce your cough.     When to seek care  Please be seen in a clinic or urgent care if any of the following occur:   New symptoms develop, or symptoms become worse   Call ahead before going to the clinic or urgent care.  Additional treatment plan   Your symptoms show that you may have coronavirus (COVID-19). This illness can cause fever, cough and trouble breathing. Many people get a mild case and get better on their own. Some people can get very sick.  What should I do?  We would like to test you for this virus.   1. Please call [phone] to schedule your visit. Explain that you were referred by [facility] to have a COVID-19 test. Be ready to share your [facility] visit ID number.  The following will serve as your written order for this COVID Test, ordered by me, for the indication of suspected COVID [Z20.828]: The test will be ordered in [employer]Box, our electronic health record, after you are scheduled. It will show as ordered and authorized by Holden Hardin MD.  Order: COVID-19 (Coronavirus) PCR  "for SYMPTOMATIC testing from [facility].      2. When it's time for your COVID test:  Stay at least 6 feet away from others. (If someone will drive you to your test, stay in the backseat, as far away from the  as you can.)   Cover your mouth and nose with a mask, tissue or washcloth.  Go straight to the testing site. Don't make any stops on the way there or back.      3.Starting now: Stay home and away from others (self-isolate) until:   You've had no fever---and no medicine that reduces fever---for one full day (24 hours). And...   Your other symptoms have gotten better. For example, your cough or breathing has improved. And...   At least 10 days have passed since your symptoms started.       During this time, don't leave the house except for testing or medical care.   Stay in your own room, even for meals. Use your own bathroom if you can.   Stay away from others in your home. No hugging, kissing or shaking hands. No visitors.  Don't go to work, school or anywhere else.    Clean \"high touch\" surfaces often (doorknobs, counters, handles, etc.). Use a household cleaning spray or wipes. You'll find a full list of  on the EPA website: www.epa.gov/pesticide-registration/list-n-disinfectants-use-against-sars-cov-2.   Cover your mouth and nose with a mask, tissue or washcloth to avoid spreading germs.  Wash your hands and face often. Use soap and water.  Caregivers in these groups are at risk for severe illness due to COVID-19:  o People 65 years and older  o People who live in a nursing home or long-term care facility  o People with chronic disease (lung, heart, cancer, diabetes, kidney, liver, immunologic)  o People who have a weakened immune system, including those who:   Are in cancer treatment  Take medicine that weakens the immune system, such as corticosteroids  Had a bone marrow or organ transplant  Have an immune deficiency  Have poorly controlled HIV or AIDS  Are obese (body mass index of 40 or " higher)  Smoke regularly   o Caregivers should wear gloves while washing dishes, handling laundry and cleaning bedrooms and bathrooms.  o Use caution when washing and drying laundry: Don't shake dirty laundry, and use the warmest water setting that you can.  o For more tips, go to www.cdc.gov/coronavirus/2019-ncov/downloads/10Things.pdf.    4.Sign up for [employer]. We know it's scary to hear that you might have COVID-19. We want to track your symptoms to make sure you're okay over the next 2 weeks. Please look for an email from [employer]---this is a free, online program that we'll use to keep in touch. To sign up, follow the link in the email. Learn more at [email or web address]  How can I take care of myself?   Get lots of rest. Drink extra fluids (unless a doctor has told you not to).   Take Tylenol (acetaminophen) for fever or pain. If you have liver or kidney problems, ask your family doctor if it's okay to take Tylenol.   Adults can take either:    650 mg (two 325 mg pills) every 4 to 6 hours, or...   1,000 mg (two 500 mg pills) every 8 hours as needed.    Note: Don't take more than 3,000 mg in one day. Acetaminophen is found in many medicines (both prescribed and over-the-counter medicines). Read all labels to be sure you don't take too much.   For children, check the Tylenol bottle for the right dose. The dose is based on the child's age or weight.    If you have other health problems (like cancer, heart failure, an organ transplant or severe kidney disease): Call your specialty clinic if you don't feel better in the next 2 days.       Know when to call 911. Emergency warning signs include:    Trouble breathing or shortness of breath Pain or pressure in the chest that doesn't go away Feeling confused like you haven't felt before, or not being able to wake up Bluish-colored lips or face.  Where can I get more information?    [facility] -- About COVID-19: [email or web address]    CDC -- What to Do If You're Sick: www.cdc.gov/coronavirus/2019-ncov/about/steps-when-sick.html   CDC -- Ending Home Isolation: www.cdc.gov/coronavirus/2019-ncov/hcp/disposition-in-home-patients.html   CDC -- Caring for Someone: www.cdc.gov/coronavirus/2019-ncov/if-you-are-sick/care-for-someone.html   [facility] -- Interim Guidance for Hospital Discharge to Home: www.health.[facility].[place]./diseases/coronavirus/hcp/hospdischarge.pdf   [facility] clinical trials (COVID-19 research studies): clinicalaffairs.[facility]-clinical-trials    Below are the COVID-19 hotlines at the [place] Department of Health ([facility]). Interpreters are available.    For health questions: Call 281-109-0024 or 1-381.690.4003 (7 a.m. to 7 p.m.) For questions about schools and childcare: Call 217-396-8666 or 1-176.461.5743 (7 a.m. to 7 p.m.)    COVID-19 (Coronavirus) General Information  Because there is currently no vaccine to prevent infection, the best way to protect yourself is to avoid being exposed to this virus. Common symptoms of COVID-19 include but are not limited to fever, cough, and shortness of breath. These symptoms appear 2-14 days after you are exposed to the virus that causes COVID-19. Click here for more information from the CDC on how to protect yourself.  If you are sick with COVID-19 or suspect you are infected with the virus that causes COVID-19, follow the steps here from the CDC to help prevent the disease from spreading to people in your home and community.  Click here for general information from the CDC on testing.  If you develop any of these emergency warning signs for COVID-19, get medical attention immediately:     Trouble breathing    Persistent pain or pressure in the chest    New confusion or inability to arouse    Bluish lips or face      Call your doctor or clinic before going in. Call [phone] if you have a medical emergency and notify the  you have or think you may have COVID-19.  For more detailed and  up to date information on COVID-19 (Coronavirus), please visit the CDC website.   Diagnosis: COVID-19  Diagnosis ICD: U07.1  Prescription: Atrovent HFA 17 mcg/actuation inhalation HFA aerosol inhaler 1 200 inhalation aerosol with adapter, 5 days supply. Inhale 2 puffs 4 times per day for 5 days. Refills: 0, Refill as needed: no, Allow substitutions: yes  Prescription: benzonatate (Tessalon Perles) 100 mg oral capsule 30 capsule, 5 days supply. Take 1-2 capsules by mouth 3 times per day as needed. Refills: 0, Refill as needed: no, Allow substitutions: yes  Pharmacy: [id number] - [phone] - [address],  [place] [address]

## 2020-08-13 ENCOUNTER — HOSPITAL ENCOUNTER (OUTPATIENT)
Dept: CARDIOLOGY | Facility: CLINIC | Age: 60
Discharge: HOME | End: 2020-08-13
Attending: INTERNAL MEDICINE | Admitting: INTERNAL MEDICINE
Payer: COMMERCIAL

## 2020-08-13 DIAGNOSIS — R06.02 SOB (SHORTNESS OF BREATH): ICD-10-CM

## 2020-08-13 DIAGNOSIS — I10 BENIGN ESSENTIAL HYPERTENSION: ICD-10-CM

## 2020-08-13 DIAGNOSIS — R60.0 LOCALIZED EDEMA: ICD-10-CM

## 2020-08-13 LAB
ANION GAP SERPL CALCULATED.3IONS-SCNC: 2 MMOL/L (ref 3–14)
BUN SERPL-MCNC: 17 MG/DL (ref 7–30)
CALCIUM SERPL-MCNC: 8.3 MG/DL (ref 8.5–10.1)
CHLORIDE SERPL-SCNC: 108 MMOL/L (ref 94–109)
CO2 SERPL-SCNC: 28 MMOL/L (ref 20–32)
CREAT SERPL-MCNC: 0.84 MG/DL (ref 0.52–1.04)
GFR SERPL CREATININE-BSD FRML MDRD: 76 ML/MIN/{1.73_M2}
GLUCOSE SERPL-MCNC: 115 MG/DL (ref 70–99)
POTASSIUM SERPL-SCNC: 4 MMOL/L (ref 3.4–5.3)
SODIUM SERPL-SCNC: 138 MMOL/L (ref 133–144)

## 2020-08-13 PROCEDURE — 40000264 ECHOCARDIOGRAM COMPLETE

## 2020-08-13 PROCEDURE — 80048 BASIC METABOLIC PNL TOTAL CA: CPT | Performed by: INTERNAL MEDICINE

## 2020-08-13 PROCEDURE — 93306 TTE W/DOPPLER COMPLETE: CPT | Mod: 26 | Performed by: INTERNAL MEDICINE

## 2020-08-13 PROCEDURE — 25500064 ZZH RX 255 OP 636: Performed by: INTERNAL MEDICINE

## 2020-08-13 PROCEDURE — 36415 COLL VENOUS BLD VENIPUNCTURE: CPT | Performed by: INTERNAL MEDICINE

## 2020-08-13 RX ADMIN — HUMAN ALBUMIN MICROSPHERES AND PERFLUTREN 3 ML: 10; .22 INJECTION, SOLUTION INTRAVENOUS at 14:04

## 2020-08-25 ENCOUNTER — TELEPHONE (OUTPATIENT)
Dept: CARDIOLOGY | Facility: CLINIC | Age: 60
End: 2020-08-25

## 2020-08-25 NOTE — TELEPHONE ENCOUNTER
Wellness Screening Tool    Symptom Screening:    Do you have one of the following NEW symptoms:      Fever (subjective or >100.0)?  No    New cough? No    Shortness of breath? No    Chills? No    New loss of taste or smell? No    Generalized body aches? No    New persistent headache? No    New sore throat? No    Nausea, vomiting or diarrhea? No    Within the past 3 weeks, have you been exposed to someone with a known positive illness below?      COVID - 19 (known or suspected) No    Chicken pox?  No    Measles? No    Pertussis? No    Have you had a positive COVID-19 diagnostic test (nasal swab test) in the last 14 days or are you currently   on self-quarantine restrictions (i.e.travel restriction, exposure, etc?) No        Patient notified of visitor restriction: Yes  Patient informed to wear a mask: Yes    Patient's appointment status: Patient will be seen in clinic as scheduled on 8/26

## 2020-08-26 ENCOUNTER — OFFICE VISIT (OUTPATIENT)
Dept: CARDIOLOGY | Facility: CLINIC | Age: 60
End: 2020-08-26
Payer: COMMERCIAL

## 2020-08-26 VITALS
HEIGHT: 66 IN | SYSTOLIC BLOOD PRESSURE: 125 MMHG | OXYGEN SATURATION: 95 % | BODY MASS INDEX: 47.09 KG/M2 | HEART RATE: 67 BPM | DIASTOLIC BLOOD PRESSURE: 80 MMHG | WEIGHT: 293 LBS

## 2020-08-26 DIAGNOSIS — R60.0 LOCALIZED EDEMA: Primary | ICD-10-CM

## 2020-08-26 DIAGNOSIS — I10 BENIGN ESSENTIAL HYPERTENSION: ICD-10-CM

## 2020-08-26 PROCEDURE — 99213 OFFICE O/P EST LOW 20 MIN: CPT | Performed by: INTERNAL MEDICINE

## 2020-08-26 ASSESSMENT — MIFFLIN-ST. JEOR: SCORE: 2224.25

## 2020-08-26 NOTE — LETTER
8/26/2020    Dennis Narayanan MD  [address] [facility] [address]    RE: Donna Elmore       Dear Colleague,    I had the pleasure of seeing Donna Elmore in the [facility].    HPI and Plan:   See dictation    No orders of the defined types were placed in this encounter.      Orders Placed This Encounter   Medications     ipratropium (ATROVENT HFA) 17 MCG/ACT inhaler     Sig: Inhale 2 puffs into the lungs every 6 hours PRN       There are no discontinued medications.      Encounter Diagnoses   Name Primary?     Localized edema Yes     Benign essential hypertension        CURRENT MEDICATIONS:  Current Outpatient Medications   Medication Sig Dispense Refill     ascorbic acid (VITAMIN C) 1000 MG TABS Take 1,000 mg by mouth daily       Calcium Carbonate Antacid (TUMS PO) Take by mouth as needed        ELDERBERRY PO        furosemide (LASIX) 20 MG tablet Take 1 tablet (20 mg) by mouth daily 30 tablet 1     glucosamine-chondroitin 500-400 MG CAPS Take 1 capsule by mouth daily        ibuprofen (ADVIL) 200 MG tablet Take 200 mg by mouth every 4 hours as needed for mild pain       ipratropium (ATROVENT HFA) 17 MCG/ACT inhaler Inhale 2 puffs into the lungs every 6 hours PRN       magnesium 250 MG tablet Take 1 tablet by mouth daily       metoprolol succinate ER (TOPROL-XL) 100 MG 24 hr tablet Take 1 tablet (100 mg) by mouth daily 90 tablet 3     Multiple Vitamins-Minerals (AIRBORNE PO)        Probiotic Product (PROBIOTIC DAILY PO)        sertraline (ZOLOFT) 50 MG tablet Take 1 tablet (50 mg) by mouth daily (Patient taking differently: Take 25 mg by mouth daily ) 30 tablet 3     VITAMIN D, CHOLECALCIFEROL, PO Take 5,000 Units by mouth daily 1000units with Vitamin C 500mg - 3 tabs am & 2 tabs pm       Zinc 50 MG CAPS          ALLERGIES     Allergies   Allergen Reactions     Penicillins GI Disturbance     Percocet [Oxycodone-Acetaminophen]        PAST MEDICAL HISTORY:  Past  "Medical History:   Diagnosis Date     Abnormal glucose 2/1/17    \"mild DM\" per 2/1/17 lab note-Dr Cornell     Appendicitis 9/2015     Depression      Hepatitis A      HTN (hypertension)      Obese      Osteoarthritis of left knee      Plantar fasciitis      Shingles      SVT (supraventricular tachycardia) (H) 8/15/2012    1/20/2010 SVT ablation     Uterine cancer (H) 3/2010    + washings     Venous dermatitis        PAST SURGICAL HISTORY:  Past Surgical History:   Procedure Laterality Date     ------------OTHER-------------  7-2011    cyst removal - back     CATHETER, ABLATION  1-2010    tachycardia ablation     D & C  3-2010     HYSTERECTOMY RADICAL  3-2010     LAPAROSCOPIC APPENDECTOMY  9/27/2013    Procedure: LAPAROSCOPIC APPENDECTOMY;  LAPAROSCOPIC APPENDECTOMY;  Surgeon: Noreen Brambila MD;  Location:  OR       FAMILY HISTORY:  Family History   Problem Relation Age of Onset     Heart Disease Mother         pacemaker, heart failure     Thyroid Disease Mother      Hypertension Mother      Cancer Father         kidney     Heart Disease Father      Hypertension Father      Thyroid Disease Sister        SOCIAL HISTORY:  Social History     Socioeconomic History     Marital status:      Spouse name: None     Number of children: None     Years of education: None     Highest education level: None   Occupational History     None   Social Needs     Financial resource strain: None     Food insecurity     Worry: None     Inability: None     Transportation needs     Medical: None     Non-medical: None   Tobacco Use     Smoking status: Never Smoker     Smokeless tobacco: Never Used   Substance and Sexual Activity     Alcohol use: No     Drug use: No     Sexual activity: None   Lifestyle     Physical activity     Days per week: None     Minutes per session: None     Stress: None   Relationships     Social connections     Talks on phone: None     Gets together: None     Attends Jewish service: None     Active member " "of club or organization: None     Attends meetings of clubs or organizations: None     Relationship status: None     Intimate partner violence     Fear of current or ex partner: None     Emotionally abused: None     Physically abused: None     Forced sexual activity: None   Other Topics Concern      Service Not Asked     Blood Transfusions Not Asked     Caffeine Concern Yes     Comment: 2-3 Diet Coke      Occupational Exposure Not Asked     Hobby Hazards Not Asked     Sleep Concern Yes     Stress Concern Yes     Weight Concern Yes     Special Diet Not Asked     Back Care No     Exercise No     Bike Helmet Not Asked     Seat Belt Yes     Self-Exams Not Asked     Parent/sibling w/ CABG, MI or angioplasty before 65F 55M? Not Asked   Social History Narrative     None       Review of Systems:  Skin:  Negative       Eyes:  Positive for glasses    ENT:  Positive for hearing loss    Respiratory:  Positive for dyspnea on exertion viral bronchitis a month ago   Cardiovascular:  Negative;palpitations;syncope or near-syncope;cyanosis;dizziness;lightheadedness Positive for;edema;fatigue chest pain, comes and goes  Gastroenterology: Negative      Genitourinary:  Positive for urinary frequency;nocturia    Musculoskeletal:         Neurologic:  Negative      Psychiatric:  Positive for depression    Heme/Lymph/Imm:  Negative easy bruising    Endocrine:  Negative        Physical Exam:  Vitals: /80 (BP Location: Right arm)   Pulse 67   Ht 1.676 m (5' 6\")   Wt (!) 163.2 kg (359 lb 14.4 oz)   SpO2 95%   BMI 58.09 kg/m      Constitutional:  cooperative;in no acute distress morbidly obese      Skin:  warm and dry to the touch, no apparent skin lesions or masses noted          Head:  normocephalic        Eyes:           Lymph:      ENT:  no pallor or cyanosis, dentition good        Neck:  carotid pulses are full and equal bilaterally        Respiratory:  clear to auscultation;normal symmetry         Cardiac: regular " rhythm;no murmurs, gallops or rubs detected                  pulses below the femoral arteries are diminished                                      GI:  abdomen soft;no bruits obese      Extremities and Muscular Skeletal:      1+;bilateral LE edema     varicose veins    Neurological:  no gross motor deficits;affect appropriate        Psych:  Alert and Oriented x 3          CC  Dennis Narayanan MD  [address] [place] [address]                    Thank you for allowing me to participate in the care of your patient.      Sincerely,     Radha Richardson,      [facility] [place]    cc:   Dennis Narayanan MD  [address] [place] [address]

## 2020-08-26 NOTE — PROGRESS NOTES
Service Date: 08/26/2020      REFERRING PHYSICIAN:  Dennis Narayanan MD       HISTORY OF PRESENT ILLNESS:  Ms. Elmore is a very pleasant, 60-year-old female with a history of hypertension, SVT and morbid obesity.  She also suffers from varicose veins and lower extremity swelling.  She comes back to clinic today with concerns about persistent lower extremity swelling.  I had recommended back in February that she undergo venous insufficiency studies and possibly see one of our Vascular doctors due to the high likelihood that she has venous insufficiency.  Unfortunately, her insurance would not cover even the diagnostic study for this for some reason, and so she never ended up getting the venous ultrasound.  She did, however, go and purchase compression socks, which she has been wearing.  She struggles with them because of the tight crease around her ankles.  She did have custom compression socks made, but still struggles with keeping them up due to her proportionate leg issues.  She has a pair of compression socks on today, which she feels like is working somewhat for her, but again struggles with the crease at the bottom of her ankles and some blueness and pain associated with that when she wears them too long.  She did undergo an echocardiogram on 08/13, and I reviewed this with her.  I reassured her her heart function looks very normal, including her right heart function, making the likelihood of her lower extremity swelling heart related very, very low.  She seems to be doing well from an arrhythmia perspective on her current regimen.  We talked a little bit about her weight issues and her increased stress levels with her mother passing, her sister who had a house fire and then, of course, the COVID era pandemic all contributing to her increasing anxiety and stress levels.  Lastly, she had some complaints about some twinges in her chest that occurred at rest and were fleeting in nature.  It did not sound exertion  related, and after her description, I did not feel this was concerning at all for heart disease, but we did talk about things to look out for that would suggest possibly evaluating for this.      PHYSICAL EXAMINATION:  On exam today, her blood pressure is 125/80, pulse of 67.  Her weight today is 359 with a body mass index of 58.  She is wearing compression socks today.  She has about 1+ edema around her ankles.  Her physical exam is otherwise unremarkable and unchanged.      In summary, Ms. Elmore is a very pleasant, 60-year-old female with a history of hypertension, SVT, morbid obesity, venous insufficiency with chronic lower extremity edema.  From a heart perspective, she seems to be doing well.  Her echocardiogram looks completely normal.  Low likelihood of a cardiac etiology to her lower extremity edema.  She has visible varicose veins and likely venous insufficiency.  Her insurance company is not covering her diagnostic test to confirm venous insufficiency.  She is wearing compression socks that have partially alleviated her symptoms.  I gave her some other ideas to help to reduce the swelling as well.  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  Utilizing an underneath-the-desk either cycle or foot pump may be helpful to utilize her muscles to improve her vascular return.  Also, we talked about daily exercise, and a 10 minute walk to start to establish the habit and eventually increasing the duration of her exercise would be useful.  She will continue to use Lasix as well to help to alleviate some of the lower extremity swelling.  She did have a basic metabolic panel it looks like on 08/13 after having used Lasix on a daily basis.  Her electrolyte panel continues to be normal.  She did not need any medication refills today.  I will be happy to continue to follow her annually or as needed.        Please feel free to contact me with any questions you have in regard to her care.      cc:   Dennis  ANNALEE Narayanan MD   [facility] [address] [place] [address]         ALEIDA RADER DO             D: 2020   T: 2020   MT: joel      Name:     DAYLIN CARTER   MRN:      [id number]        Account:      [id number]   :      1960           Service Date: 2020      Document: [id number]

## 2020-08-26 NOTE — LETTER
8/26/2020      Dennis Narayanan MD  [address]  [facility] [address]      RE: Donna Elmore       Dear Colleague,    I had the pleasure of seeing Donna Elmore in the [facility].    Service Date: 08/26/2020      REFERRING PHYSICIAN:  Dennis Narayanan MD       HISTORY OF PRESENT ILLNESS:  Ms. Elmore is a very pleasant, 60-year-old female with a history of hypertension, SVT and morbid obesity.  She also suffers from varicose veins and lower extremity swelling.  She comes back to clinic today with concerns about persistent lower extremity swelling.  I had recommended back in February that she undergo venous insufficiency studies and possibly see one of our Vascular doctors due to the high likelihood that she has venous insufficiency.  Unfortunately, her insurance would not cover even the diagnostic study for this for some reason, and so she never ended up getting the venous ultrasound.  She did, however, go and purchase compression socks, which she has been wearing.  She struggles with them because of the tight crease around her ankles.  She did have custom compression socks made, but still struggles with keeping them up due to her proportionate leg issues.  She has a pair of compression socks on today, which she feels like is working somewhat for her, but again struggles with the crease at the bottom of her ankles and some blueness and pain associated with that when she wears them too long.  She did undergo an echocardiogram on 08/13, and I reviewed this with her.  I reassured her her heart function looks very normal, including her right heart function, making the likelihood of her lower extremity swelling heart related very, very low.  She seems to be doing well from an arrhythmia perspective on her current regimen.  We talked a little bit about her weight issues and her increased stress levels with her mother passing, her sister who had a house fire and then, of  course, the COVID era pandemic all contributing to her increasing anxiety and stress levels.  Lastly, she had some complaints about some twinges in her chest that occurred at rest and were fleeting in nature.  It did not sound exertion related, and after her description, I did not feel this was concerning at all for heart disease, but we did talk about things to look out for that would suggest possibly evaluating for this.      PHYSICAL EXAMINATION:  On exam today, her blood pressure is 125/80, pulse of 67.  Her weight today is 359 with a body mass index of 58.  She is wearing compression socks today.  She has about 1+ edema around her ankles.  Her physical exam is otherwise unremarkable and unchanged.      In summary, Ms. Elmore is a very pleasant, 60-year-old female with a history of hypertension, SVT, morbid obesity, venous insufficiency with chronic lower extremity edema.  From a heart perspective, she seems to be doing well.  Her echocardiogram looks completely normal.  Low likelihood of a cardiac etiology to her lower extremity edema.  She has visible varicose veins and likely venous insufficiency.  Her insurance company is not covering her diagnostic test to confirm venous insufficiency.  She is wearing compression socks that have partially alleviated her symptoms.  I gave her some other ideas to help to reduce the swelling as well.  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  Utilizing an underneath-the-desk either cycle or foot pump may be helpful to utilize her muscles to improve her vascular return.  Also, we talked about daily exercise, and a 10 minute walk to start to establish the habit and eventually increasing the duration of her exercise would be useful.  She will continue to use Lasix as well to help to alleviate some of the lower extremity swelling.  She did have a basic metabolic panel it looks like on 08/13 after having used Lasix on a daily basis.  Her electrolyte panel  continues to be normal.  She did not need any medication refills today.  I will be happy to continue to follow her annually or as needed.        Please feel free to contact me with any questions you have in regard to her care.      cc:   Dennis Narayanan MD   [facility]    [address] [place] [address]         RADHA RADER DO             D: 2020   T: 2020   MT: joel      Name:     DAYLIN CARTER   MRN:      [id number]        Account:      [id number]   :      1960           Service Date: 2020      Document: [id number]         Outpatient Encounter Medications as of 2020   Medication Sig Dispense Refill     ascorbic acid (VITAMIN C) 1000 MG TABS Take 1,000 mg by mouth daily       Calcium Carbonate Antacid (TUMS PO) Take by mouth as needed        ELDERBERRY PO        furosemide (LASIX) 20 MG tablet Take 1 tablet (20 mg) by mouth daily 30 tablet 1     glucosamine-chondroitin 500-400 MG CAPS Take 1 capsule by mouth daily        ibuprofen (ADVIL) 200 MG tablet Take 200 mg by mouth every 4 hours as needed for mild pain       ipratropium (ATROVENT HFA) 17 MCG/ACT inhaler Inhale 2 puffs into the lungs every 6 hours PRN       magnesium 250 MG tablet Take 1 tablet by mouth daily       metoprolol succinate ER (TOPROL-XL) 100 MG 24 hr tablet Take 1 tablet (100 mg) by mouth daily 90 tablet 3     Multiple Vitamins-Minerals (AIRBORNE PO)        Probiotic Product (PROBIOTIC DAILY PO)        sertraline (ZOLOFT) 50 MG tablet Take 1 tablet (50 mg) by mouth daily (Patient taking differently: Take 25 mg by mouth daily ) 30 tablet 3     VITAMIN D, CHOLECALCIFEROL, PO Take 5,000 Units by mouth daily 1000units with Vitamin C 500mg - 3 tabs am & 2 tabs pm       Zinc 50 MG CAPS        No facility-administered encounter medications on file as of 2020.        Again, thank you for allowing me to participate in the care of your patient.      Sincerely,    Radha  Antonietta Richardson,      [facility]

## 2020-08-26 NOTE — PROGRESS NOTES
"HPI and Plan:   See dictation    No orders of the defined types were placed in this encounter.      Orders Placed This Encounter   Medications     ipratropium (ATROVENT HFA) 17 MCG/ACT inhaler     Sig: Inhale 2 puffs into the lungs every 6 hours PRN       There are no discontinued medications.      Encounter Diagnoses   Name Primary?     Localized edema Yes     Benign essential hypertension        CURRENT MEDICATIONS:  Current Outpatient Medications   Medication Sig Dispense Refill     ascorbic acid (VITAMIN C) 1000 MG TABS Take 1,000 mg by mouth daily       Calcium Carbonate Antacid (TUMS PO) Take by mouth as needed        ELDERBERRY PO        furosemide (LASIX) 20 MG tablet Take 1 tablet (20 mg) by mouth daily 30 tablet 1     glucosamine-chondroitin 500-400 MG CAPS Take 1 capsule by mouth daily        ibuprofen (ADVIL) 200 MG tablet Take 200 mg by mouth every 4 hours as needed for mild pain       ipratropium (ATROVENT HFA) 17 MCG/ACT inhaler Inhale 2 puffs into the lungs every 6 hours PRN       magnesium 250 MG tablet Take 1 tablet by mouth daily       metoprolol succinate ER (TOPROL-XL) 100 MG 24 hr tablet Take 1 tablet (100 mg) by mouth daily 90 tablet 3     Multiple Vitamins-Minerals (AIRBORNE PO)        Probiotic Product (PROBIOTIC DAILY PO)        sertraline (ZOLOFT) 50 MG tablet Take 1 tablet (50 mg) by mouth daily (Patient taking differently: Take 25 mg by mouth daily ) 30 tablet 3     VITAMIN D, CHOLECALCIFEROL, PO Take 5,000 Units by mouth daily 1000units with Vitamin C 500mg - 3 tabs am & 2 tabs pm       Zinc 50 MG CAPS          ALLERGIES     Allergies   Allergen Reactions     Penicillins GI Disturbance     Percocet [Oxycodone-Acetaminophen]        PAST MEDICAL HISTORY:  Past Medical History:   Diagnosis Date     Abnormal glucose 2/1/17    \"mild DM\" per 2/1/17 lab note-Dr Cornell     Appendicitis 9/2015     Depression      Hepatitis A      HTN (hypertension)      Obese      Osteoarthritis of left knee  "     Plantar fasciitis      Shingles      SVT (supraventricular tachycardia) (H) 8/15/2012    1/20/2010 SVT ablation     Uterine cancer (H) 3/2010    + washings     Venous dermatitis        PAST SURGICAL HISTORY:  Past Surgical History:   Procedure Laterality Date     ------------OTHER-------------  7-2011    cyst removal - back     CATHETER, ABLATION  1-2010    tachycardia ablation     D & C  3-2010     HYSTERECTOMY RADICAL  3-2010     LAPAROSCOPIC APPENDECTOMY  9/27/2013    Procedure: LAPAROSCOPIC APPENDECTOMY;  LAPAROSCOPIC APPENDECTOMY;  Surgeon: Noreen Brambila MD;  Location:  OR       FAMILY HISTORY:  Family History   Problem Relation Age of Onset     Heart Disease Mother         pacemaker, heart failure     Thyroid Disease Mother      Hypertension Mother      Cancer Father         kidney     Heart Disease Father      Hypertension Father      Thyroid Disease Sister        SOCIAL HISTORY:  Social History     Socioeconomic History     Marital status:      Spouse name: None     Number of children: None     Years of education: None     Highest education level: None   Occupational History     None   Social Needs     Financial resource strain: None     Food insecurity     Worry: None     Inability: None     Transportation needs     Medical: None     Non-medical: None   Tobacco Use     Smoking status: Never Smoker     Smokeless tobacco: Never Used   Substance and Sexual Activity     Alcohol use: No     Drug use: No     Sexual activity: None   Lifestyle     Physical activity     Days per week: None     Minutes per session: None     Stress: None   Relationships     Social connections     Talks on phone: None     Gets together: None     Attends Uats[employer] service: None     Active member of club or organization: None     Attends meetings of clubs or organizations: None     Relationship status: None     Intimate partner violence     Fear of current or ex partner: None     Emotionally abused: None     Physically  "abused: None     Forced sexual activity: None   Other Topics Concern      Service Not Asked     Blood Transfusions Not Asked     Caffeine Concern Yes     Comment: 2-3 Diet Coke      Occupational Exposure Not Asked     Hobby Hazards Not Asked     Sleep Concern Yes     Stress Concern Yes     Weight Concern Yes     Special Diet Not Asked     Back Care No     Exercise No     Bike Helmet Not Asked     Seat Belt Yes     Self-Exams Not Asked     Parent/sibling w/ CABG, MI or angioplasty before 65F 55M? Not Asked   Social History Narrative     None       Review of Systems:  Skin:  Negative       Eyes:  Positive for glasses    ENT:  Positive for hearing loss    Respiratory:  Positive for dyspnea on exertion viral bronchitis a month ago   Cardiovascular:  Negative;palpitations;syncope or near-syncope;cyanosis;dizziness;lightheadedness Positive for;edema;fatigue chest pain, comes and goes  Gastroenterology: Negative      Genitourinary:  Positive for urinary frequency;nocturia    Musculoskeletal:         Neurologic:  Negative      Psychiatric:  Positive for depression    Heme/Lymph/Imm:  Negative easy bruising    Endocrine:  Negative        Physical Exam:  Vitals: /80 (BP Location: Right arm)   Pulse 67   Ht 1.676 m (5' 6\")   Wt (!) 163.2 kg (359 lb 14.4 oz)   SpO2 95%   BMI 58.09 kg/m      Constitutional:  cooperative;in no acute distress morbidly obese      Skin:  warm and dry to the touch, no apparent skin lesions or masses noted          Head:  normocephalic        Eyes:           Lymph:      ENT:  no pallor or cyanosis, dentition good        Neck:  carotid pulses are full and equal bilaterally        Respiratory:  clear to auscultation;normal symmetry         Cardiac: regular rhythm;no murmurs, gallops or rubs detected                  pulses below the femoral arteries are diminished                                      GI:  abdomen soft;no bruits obese      Extremities and Muscular Skeletal:      " 1+;bilateral LE edema     varicose veins    Neurological:  no gross motor deficits;affect appropriate        Psych:  Alert and Oriented x 3          CC  Dennis Narayanan MD  [address] [place] [address]

## 2020-09-22 DIAGNOSIS — I10 ESSENTIAL HYPERTENSION: ICD-10-CM

## 2020-09-22 RX ORDER — METOPROLOL SUCCINATE 100 MG/1
TABLET, EXTENDED RELEASE ORAL
Qty: 90 TABLET | Refills: 3 | Status: SHIPPED | OUTPATIENT
Start: 2020-09-22 | End: 2021-08-23

## 2020-09-22 NOTE — TELEPHONE ENCOUNTER
METOPROLOL  LOV (DEPRESSION) 7/15/2020  LOV (RELATED) 8/9/19  LAST LABS (CARDIOLOGY) 8/13/2020    DUE FOR MED CHECK    BP Readings from Last 3 Encounters:   08/26/20 125/80   07/15/20 128/78   02/26/20 131/84       Last Comprehensive Metabolic Panel:  Sodium   Date Value Ref Range Status   08/13/2020 138 133 - 144 mmol/L Final     Potassium   Date Value Ref Range Status   08/13/2020 4.0 3.4 - 5.3 mmol/L Final     Chloride   Date Value Ref Range Status   08/13/2020 108 94 - 109 mmol/L Final     Carbon Dioxide   Date Value Ref Range Status   08/13/2020 28 20 - 32 mmol/L Final     Anion Gap   Date Value Ref Range Status   08/13/2020 2 (L) 3 - 14 mmol/L Final     Glucose   Date Value Ref Range Status   08/13/2020 115 (H) 70 - 99 mg/dL Final     Urea Nitrogen   Date Value Ref Range Status   08/13/2020 17 7 - 30 mg/dL Final     Creatinine   Date Value Ref Range Status   08/13/2020 0.84 0.52 - 1.04 mg/dL Final     GFR Estimate   Date Value Ref Range Status   08/13/2020 76 >60 mL/min/[1.73_m2] Final     Comment:     Non  GFR Calc  Starting 12/18/2018, serum creatinine based estimated GFR (eGFR) will be   calculated using the Chronic Kidney Disease Epidemiology Collaboration   (CKD-EPI) equation.       Calcium   Date Value Ref Range Status   08/13/2020 8.3 (L) 8.5 - 10.1 mg/dL Final

## 2020-10-06 DIAGNOSIS — I10 ESSENTIAL HYPERTENSION: ICD-10-CM

## 2020-10-06 DIAGNOSIS — I47.10 SVT (SUPRAVENTRICULAR TACHYCARDIA) (H): ICD-10-CM

## 2020-10-06 DIAGNOSIS — I10 BENIGN ESSENTIAL HYPERTENSION: ICD-10-CM

## 2020-10-06 DIAGNOSIS — R60.0 LOCALIZED EDEMA: ICD-10-CM

## 2020-10-06 RX ORDER — FUROSEMIDE 20 MG
20 TABLET ORAL DAILY
Qty: 90 TABLET | Refills: 3 | Status: SHIPPED | OUTPATIENT
Start: 2020-10-06 | End: 2021-11-17

## 2020-12-01 ENCOUNTER — TELEPHONE (OUTPATIENT)
Dept: CARDIOLOGY | Facility: CLINIC | Age: 60
End: 2020-12-01

## 2020-12-01 DIAGNOSIS — I10 ESSENTIAL HYPERTENSION: Primary | ICD-10-CM

## 2020-12-01 NOTE — TELEPHONE ENCOUNTER
Received call from pt regarding swelling. Pt reports that she has experienced more swelling in her legs and she is wondering if she can increase her lasix to 40 mg daily. Pt does not think the 20mg daily is doing enough for her fluid overload.   --------------------------------------------------------------------------------------------------------------------------------------------------------    Spoke with Dr. Richardson about this and she recommended that pt go up to 40mg daily and see how that goes.   Dr. Richardson would like pt to have a BMP in 1 week after starting the increased dose. Informed pt that she should increase potassium in her diet while she is on the increased dose.     Informed pt about the increase and she will have labs done at her PCP's office.   Pt gave verbal understanding.

## 2020-12-09 DIAGNOSIS — F33.0 MAJOR DEPRESSIVE DISORDER, RECURRENT EPISODE, MILD (H): ICD-10-CM

## 2020-12-14 ENCOUNTER — OFFICE VISIT (OUTPATIENT)
Dept: FAMILY MEDICINE | Facility: CLINIC | Age: 60
End: 2020-12-14

## 2020-12-14 VITALS
WEIGHT: 293 LBS | DIASTOLIC BLOOD PRESSURE: 86 MMHG | TEMPERATURE: 97.9 F | BODY MASS INDEX: 61.43 KG/M2 | SYSTOLIC BLOOD PRESSURE: 134 MMHG | HEART RATE: 73 BPM | OXYGEN SATURATION: 96 %

## 2020-12-14 DIAGNOSIS — Z12.11 SCREENING FOR COLON CANCER: ICD-10-CM

## 2020-12-14 DIAGNOSIS — Z12.31 ENCOUNTER FOR SCREENING MAMMOGRAM FOR MALIGNANT NEOPLASM OF BREAST: ICD-10-CM

## 2020-12-14 DIAGNOSIS — I10 ESSENTIAL HYPERTENSION: ICD-10-CM

## 2020-12-14 DIAGNOSIS — E11.9 TYPE 2 DIABETES MELLITUS WITHOUT COMPLICATION, WITHOUT LONG-TERM CURRENT USE OF INSULIN (H): Primary | ICD-10-CM

## 2020-12-14 DIAGNOSIS — E66.01 MORBID OBESITY (H): ICD-10-CM

## 2020-12-14 DIAGNOSIS — F33.0 MAJOR DEPRESSIVE DISORDER, RECURRENT EPISODE, MILD (H): ICD-10-CM

## 2020-12-14 DIAGNOSIS — Z23 ENCOUNTER FOR IMMUNIZATION: ICD-10-CM

## 2020-12-14 DIAGNOSIS — R60.0 BILATERAL LOWER EXTREMITY EDEMA: ICD-10-CM

## 2020-12-14 PROCEDURE — 90472 IMMUNIZATION ADMIN EACH ADD: CPT | Performed by: FAMILY MEDICINE

## 2020-12-14 PROCEDURE — 93050 ART PRESSURE WAVEFORM ANALYS: CPT | Performed by: FAMILY MEDICINE

## 2020-12-14 PROCEDURE — 90686 IIV4 VACC NO PRSV 0.5 ML IM: CPT | Performed by: FAMILY MEDICINE

## 2020-12-14 PROCEDURE — 90732 PPSV23 VACC 2 YRS+ SUBQ/IM: CPT | Performed by: FAMILY MEDICINE

## 2020-12-14 PROCEDURE — 36415 COLL VENOUS BLD VENIPUNCTURE: CPT | Performed by: FAMILY MEDICINE

## 2020-12-14 PROCEDURE — 90471 IMMUNIZATION ADMIN: CPT | Mod: 59 | Performed by: FAMILY MEDICINE

## 2020-12-14 PROCEDURE — 99214 OFFICE O/P EST MOD 30 MIN: CPT | Mod: 25 | Performed by: FAMILY MEDICINE

## 2020-12-14 PROCEDURE — 82044 UR ALBUMIN SEMIQUANTITATIVE: CPT | Performed by: FAMILY MEDICINE

## 2020-12-14 PROCEDURE — 99207 PR FOOT EXAM NO CHARGE: CPT | Performed by: FAMILY MEDICINE

## 2020-12-14 RX ORDER — GABAPENTIN 100 MG/1
CAPSULE ORAL
COMMUNITY
Start: 2020-11-12 | End: 2021-09-03

## 2020-12-14 RX ORDER — CHLORTHALIDONE 25 MG/1
25 TABLET ORAL DAILY
Qty: 30 TABLET | Refills: 1 | Status: SHIPPED | OUTPATIENT
Start: 2020-12-14 | End: 2021-09-03

## 2020-12-14 NOTE — LETTER
[facility] [address] [place]  [address]  Phone: [phone]    December 16, 2020      Donna Elmore  [address]  [facility] [address]              Dear Donna,     The only significant finding on your labs is that the A1c has crossed back into the diabetes range.  We discussed this possibility the other day.  Let's go over options in more detail at your upcoming follow up appointment.  No other changes are needed at this time and we will keep the current plan in place.  I would like to discuss Metformin at your appointment and the potential benefit it may have with respect to blood sugar control, weight and insulin resistance.     It was great meeting you.       Sincerely,     Sreedhar Parks MD     Results for orders placed or performed in visit on 12/14/20   Thyroid [place] Profile ([facility])     Status: None   Result Value Ref Range    TSH 3.030 0.450 - 4.500 uIU/mL    Narrative    Performed at:  [id number]  [employer] [place] [address]  [id number]  : Sai Bailey MD, Phone:  [phone]   Comp. Metabolic Panel (14) ([facility])     Status: Abnormal   Result Value Ref Range    Glucose 136 (H) 65 - 99 mg/dL    Urea Nitrogen 21 6 - 24 mg/dL    Creatinine 0.66 0.57 - 1.00 mg/dL    eGFR If NonAfricn Am 97 >59 mL/min/1.73    eGFR If Africn Am 112 >59 mL/min/1.73    BUN/Creatinine Ratio 32 (H) 9 - 23    Sodium 142 134 - 144 mmol/L    Potassium 4.8 3.5 - 5.2 mmol/L    Chloride 103 96 - 106 mmol/L    Total CO2 29 20 - 29 mmol/L    Calcium 9.0 8.7 - 10.2 mg/dL    Protein Total 6.2 6.0 - 8.5 g/dL    Albumin 4.0 3.8 - 4.9 g/dL    Globulin, Total 2.2 1.5 - 4.5 g/dL    A/G Ratio 1.8 1.2 - 2.2    Bilirubin Total 0.4 0.0 - 1.2 mg/dL    Alkaline Phosphatase 75 39 - 117 IU/L    AST 11 0 - 40 IU/L    ALT 17 0 - 32 IU/L    Narrative    Performed at:  01 - [facility]  [address], [place]  [id number]  : Sai Bailey MD, Phone:  [phone]    Hemoglobin A1C ([facility])     Status: Abnormal   Result Value Ref Range    Hemoglobin A1C 6.8 (H) 4.8 - 5.6 %    Narrative    Performed at:  01 - [facility] [address], [place], [place]  [id number]  : Sai Bailey MD, Phone:  [phone]   Microalbumin ([facility])     Status: None   Result Value Ref Range    Albumin mg/L 30 mg/L    Urine Creatinine Mg/Dl 200 mg/dL    A/C Ratio mg/g <30 mg/g    Interpretation Normal

## 2020-12-14 NOTE — PROGRESS NOTES
SUBJECTIVE:                                                    Donna Elmore is a 60 year old female who presents to clinic today for evaluation.  She has had a difficult year with multiple stressors.  Previously she had lost about 40 pounds with weight watchers but weight has significantly increased again.    Wt Readings from Last 4 Encounters:   12/14/20 (!) 172.6 kg (380 lb 9.6 oz)   08/26/20 (!) 163.2 kg (359 lb 14.4 oz)   07/15/20 (!) 159.2 kg (351 lb)   02/26/20 (!) 150.1 kg (331 lb)     She saw Dr Richardson recently for follow up.  Leg edema has significantly worsened.  She had a normal echo.  Was started on furosemide without improvement.  Dose then doubled, still no significant improvement.  Was out on steroid burst for presumed lumbar radiculopathy and gabapentin.  Still on gabapentin.  Back pain has resolved.      Mood is lower than usual due to weight and concerns about her health.  No suicidal thoughts.  On sertraline    A1c 6.5 a few years ago and had improved.    Lab Results   Component Value Date    A1C 5.8 12/19/2019    A1C 6.2 08/27/2018    A1C 6.5 02/01/2017     HTN: very elevated today but improved on recheck.        Problem list, Medication list, Allergies, and Medical/Social/Surgical histories reviewed in [facility] and updated as appropriate.   Additional history: as documented    ROS:    A 10 system review was completed and is as noted in HPI and otherwise negative.      Histories:   Patient Active Problem List   Diagnosis     Uterine cancer (H)     SVT (supraventricular tachycardia) (H)     Major depressive disorder, recurrent episode, mild (H)     Osteoarthritis of left knee     HTN (hypertension)     Morbid obesity (H)     Type 2 diabetes mellitus without complication, without long-term current use of insulin (H)     Past Surgical History:   Procedure Laterality Date     ------------OTHER-------------  7-2011    cyst removal - back     CATHETER, ABLATION  1-2010    tachycardia ablation     D &  C  3-2010     HYSTERECTOMY RADICAL  3-2010     LAPAROSCOPIC APPENDECTOMY  9/27/2013    Procedure: LAPAROSCOPIC APPENDECTOMY;  LAPAROSCOPIC APPENDECTOMY;  Surgeon: Noreen Brambila MD;  Location:  OR       Social History     Tobacco Use     Smoking status: Never Smoker     Smokeless tobacco: Never Used   Substance Use Topics     Alcohol use: No     Family History   Problem Relation Age of Onset     Heart Disease Mother         pacemaker, heart failure     Thyroid Disease Mother      Hypertension Mother      Cancer Father         kidney     Heart Disease Father      Hypertension Father      Thyroid Disease Sister            OBJECTIVE:                                                    /86   Pulse 73   Temp 97.9  F (36.6  C)   Wt (!) 172.6 kg (380 lb 9.6 oz)   SpO2 96%   BMI 61.43 kg/m    Body mass index is 61.43 kg/m .     General: Obese, NAD  Oropharynx: Clear  Ext: 3+ LE edema bilaterally to knees  Skin: Clear without lesions or rash  Psych: Normal mood and affect         ASSESSMENT/PLAN:                                                        Type 2 diabetes mellitus without complication, without long-term current use of insulin (H): aggressive LSCs, nutrition consult, close follow up  -     FOOT EXAM  -     Hemoglobin A1C ([facility])  -     Microalbumin ([facility])  -     OPTOMETRY REFERRAL  -     VENOUS COLLECTION    Essential hypertension: add CTD.  Recheck BMP 2 weeks  -     [place] ART PRESS WAVEFORM ANALYS CENTRAL ART PRESSURE  -     chlorthalidone (HYGROTON) 25 MG tablet; Take 1 tablet (25 mg) by mouth daily    Major depressive disorder, recurrent episode, mild (H): suboptimal control but stable.  Close follow up    Bilateral lower extremity edema: check TSH, protein levels, hepatic and kidney fxn.  Stop gabapentin.  Add compression stockings.  Increase activity.  Recheck 2 weeks  -     Thyroid [place] Profile ([facility])  -     Comp. Metabolic Panel (14) ([facility])  -     Compression Sleeve/Stocking Order for  DME - ONLY FOR DME    Morbid obesity (H)  -     NUTRITION REFERRAL  - Not interested in bariatric consultation at this time    Encounter for immunization  -     ADMIN 1st VACCINE  -     EA ADD'L VACCINE    Screening for colon cancer  -     GASTROENTEROLOGY ADULT REF PROCEDURE ONLY; Future    Encounter for screening mammogram for malignant neoplasm of breast  -     MAMMO -  Screening Digital Bilateral ([facility]); Future    Other orders  -     PPSV23, IM/SUBQ (2+ YRS) - [id number]  -     HC FLU VAC PRESRV FREE QUAD SPLIT VIR > 6 MONTHS IM        The following health maintenance items are reviewed in Epic and correct as of today:  Health Maintenance   Topic Date Due     PREVENTIVE CARE VISIT  1960     ADVANCE CARE PLANNING  1960     EYE EXAM  1960     COLORECTAL CANCER SCREENING  03/17/1971     HIV SCREENING  03/17/1976     PAP  03/17/1982     MAMMO SCREENING  03/28/2013     ZOSTER IMMUNIZATION (2 of 2) 10/22/2018     A1C  06/19/2020     LIPID  12/19/2020     MICROALBUMIN  12/19/2020     PHQ-9  01/15/2021     BMP  08/13/2021     DIABETIC FOOT EXAM  12/14/2021     DTAP/TDAP/TD IMMUNIZATION (3 - Td) 08/27/2028     HEPATITIS C SCREENING  Completed     DEPRESSION ACTION PLAN  Completed     INFLUENZA VACCINE  Completed     Pneumococcal Vaccine: Pediatrics (0 to 5 Years) and At-Risk Patients (6 to 64 Years)  Completed     HEPATITIS B IMMUNIZATION  Completed     IPV IMMUNIZATION  Aged Out     MENINGITIS IMMUNIZATION  Aged Out         Sreedhar Parks MD  [facility]

## 2020-12-15 LAB — HBA1C MFR BLD: 6.8 % (ref 4.8–5.6)

## 2020-12-16 LAB
A/C RATIO MG/G: <30 MG/G
ALBUMIN (URINE) MG/L: 30 MG/L
ALBUMIN SERPL-MCNC: 4 G/DL (ref 3.8–4.9)
ALBUMIN/GLOB SERPL: 1.8 {RATIO} (ref 1.2–2.2)
ALP SERPL-CCNC: 75 IU/L (ref 39–117)
ALT SERPL-CCNC: 17 IU/L (ref 0–32)
AST SERPL-CCNC: 11 IU/L (ref 0–40)
BILIRUB SERPL-MCNC: 0.4 MG/DL (ref 0–1.2)
BUN SERPL-MCNC: 21 MG/DL (ref 6–24)
BUN/CREATININE RATIO: 32 (ref 9–23)
CALCIUM SERPL-MCNC: 9 MG/DL (ref 8.7–10.2)
CHLORIDE SERPLBLD-SCNC: 103 MMOL/L (ref 96–106)
CREAT SERPL-MCNC: 0.66 MG/DL (ref 0.57–1)
EGFR IF AFRICN AM: 112 ML/MIN/1.73
EGFR IF NONAFRICN AM: 97 ML/MIN/1.73
GLOBULIN, TOTAL: 2.2 G/DL (ref 1.5–4.5)
GLUCOSE SERPL-MCNC: 136 MG/DL (ref 65–99)
INTERPRETATION: NORMAL
POTASSIUM SERPL-SCNC: 4.8 MMOL/L (ref 3.5–5.2)
PROT SERPL-MCNC: 6.2 G/DL (ref 6–8.5)
SODIUM SERPL-SCNC: 142 MMOL/L (ref 134–144)
TOTAL CO2: 29 MMOL/L (ref 20–29)
TSH BLD-ACNC: 3.03 UIU/ML (ref 0.45–4.5)
URINE CREATININE MG/DL - QUEST: 200 MG/DL

## 2020-12-28 ENCOUNTER — OFFICE VISIT (OUTPATIENT)
Dept: FAMILY MEDICINE | Facility: CLINIC | Age: 60
End: 2020-12-28

## 2020-12-28 VITALS
WEIGHT: 293 LBS | HEART RATE: 60 BPM | SYSTOLIC BLOOD PRESSURE: 107 MMHG | BODY MASS INDEX: 61.37 KG/M2 | DIASTOLIC BLOOD PRESSURE: 64 MMHG | TEMPERATURE: 98.1 F | RESPIRATION RATE: 18 BRPM | OXYGEN SATURATION: 97 %

## 2020-12-28 DIAGNOSIS — E66.01 MORBID OBESITY (H): ICD-10-CM

## 2020-12-28 DIAGNOSIS — I10 ESSENTIAL HYPERTENSION: Primary | ICD-10-CM

## 2020-12-28 DIAGNOSIS — E11.9 TYPE 2 DIABETES MELLITUS WITHOUT COMPLICATION, WITHOUT LONG-TERM CURRENT USE OF INSULIN (H): ICD-10-CM

## 2020-12-28 DIAGNOSIS — M79.622 AXILLARY PAIN, LEFT: ICD-10-CM

## 2020-12-28 DIAGNOSIS — F33.0 MAJOR DEPRESSIVE DISORDER, RECURRENT EPISODE, MILD (H): ICD-10-CM

## 2020-12-28 PROCEDURE — 93050 ART PRESSURE WAVEFORM ANALYS: CPT | Performed by: FAMILY MEDICINE

## 2020-12-28 PROCEDURE — 99214 OFFICE O/P EST MOD 30 MIN: CPT | Performed by: FAMILY MEDICINE

## 2020-12-28 RX ORDER — METFORMIN HCL 500 MG
2000 TABLET, EXTENDED RELEASE 24 HR ORAL
Qty: 120 TABLET | Refills: 2 | Status: CANCELLED | OUTPATIENT
Start: 2020-12-28

## 2020-12-28 ASSESSMENT — ANXIETY QUESTIONNAIRES
6. BECOMING EASILY ANNOYED OR IRRITABLE: MORE THAN HALF THE DAYS
5. BEING SO RESTLESS THAT IT IS HARD TO SIT STILL: MORE THAN HALF THE DAYS
3. WORRYING TOO MUCH ABOUT DIFFERENT THINGS: SEVERAL DAYS
1. FEELING NERVOUS, ANXIOUS, OR ON EDGE: NEARLY EVERY DAY
IF YOU CHECKED OFF ANY PROBLEMS ON THIS QUESTIONNAIRE, HOW DIFFICULT HAVE THESE PROBLEMS MADE IT FOR YOU TO DO YOUR WORK, TAKE CARE OF THINGS AT HOME, OR GET ALONG WITH OTHER PEOPLE: VERY DIFFICULT
2. NOT BEING ABLE TO STOP OR CONTROL WORRYING: SEVERAL DAYS
GAD7 TOTAL SCORE: 12
7. FEELING AFRAID AS IF SOMETHING AWFUL MIGHT HAPPEN: NOT AT ALL

## 2020-12-28 ASSESSMENT — PATIENT HEALTH QUESTIONNAIRE - PHQ9
SUM OF ALL RESPONSES TO PHQ QUESTIONS 1-9: 15
5. POOR APPETITE OR OVEREATING: NEARLY EVERY DAY

## 2020-12-28 NOTE — PATIENT INSTRUCTIONS
Ludmila Day,   [email or web address]    Jacqueline Bobby PhD  [email or web address]

## 2020-12-28 NOTE — PROGRESS NOTES
SUBJECTIVE:                                                    Donna Elmore is a 60 year old female who presents to clinic today for follow up.  She did not end up starting Chlorthalidone yet and her BP is actually low today.  Unsure what it has been at home.  Legs feel better off gabapentin.      Mood remains an issue.  Only taking sertraline 25 mg.  Feels this is at the root of her eating.  interested in seeing nutrition (has not made appointment) and psychologist    DM2: A1c now back in DM range.  Would like to hold off on meds and see if she can lose weight    Also was advised by breast center since she has had longstanding left side/axillary pain to switch screening mammo to diagnostic with US.        Problem list, Medication list, Allergies, and Medical/Social/Surgical histories reviewed in EPIC and updated as appropriate.   Additional history: as documented    ROS:    A 10 system review was completed and is as noted in HPI and otherwise negative.      Histories:   Patient Active Problem List   Diagnosis     Uterine cancer (H)     SVT (supraventricular tachycardia) (H)     Major depressive disorder, recurrent episode, mild (H)     Osteoarthritis of left knee     HTN (hypertension)     Morbid obesity (H)     Type 2 diabetes mellitus without complication, without long-term current use of insulin (H)     Past Surgical History:   Procedure Laterality Date     ------------OTHER-------------  7-2011    cyst removal - back     CATHETER, ABLATION  1-2010    tachycardia ablation     D & C  3-2010     HYSTERECTOMY RADICAL  3-2010     LAPAROSCOPIC APPENDECTOMY  9/27/2013    Procedure: LAPAROSCOPIC APPENDECTOMY;  LAPAROSCOPIC APPENDECTOMY;  Surgeon: Noreen Brambila MD;  Location:  OR       Social History     Tobacco Use     Smoking status: Never Smoker     Smokeless tobacco: Never Used   Substance Use Topics     Alcohol use: No     Family History   Problem Relation Age of Onset     Heart Disease Mother          pacemaker, heart failure     Thyroid Disease Mother      Hypertension Mother      Cancer Father         kidney     Heart Disease Father      Hypertension Father      Thyroid Disease Sister            OBJECTIVE:                                                    /64   Pulse 60   Temp 98.1  F (36.7  C) (Skin)   Resp 18   Wt (!) 172.5 kg (380 lb 3.2 oz)   SpO2 97%   BMI 61.37 kg/m    Body mass index is 61.37 kg/m .     General: Obese, NAD  Oropharynx: Clear  Heart: RRR, no murmur  Chest: Lungs CTAB  Skin: Clear without lesions or rash  Psych: Normal mood and affect         ASSESSMENT/PLAN:                                                        Essential hypertension: normal today.  Hold off on CTD until she can monitor at home to see more consistent readings  -     [place] ART PRESS WAVEFORM ANALYS CENTRAL ART PRESSURE    Morbid obesity (H): nutrition consult.  Increase activity.  Start counseling.      Type 2 diabetes mellitus without complication, without long-term current use of insulin (H): recheck 3 months.      Major depressive disorder, recurrent episode, mild (H): increase sertraline to 50 mg.  Increase to 100 mg at follow up if not improving.    Axillary pain, left  -     MAMMO - Diagnostic Digital Bilateral ([facility]); Future  -     US Breast Left Complete 4 Quadrants; Future        The following health maintenance items are reviewed in Epic and correct as of today:  Health Maintenance   Topic Date Due     PREVENTIVE CARE VISIT  1960     ADVANCE CARE PLANNING  1960     EYE EXAM  1960     COLORECTAL CANCER SCREENING  03/17/1971     HIV SCREENING  03/17/1976     PAP  03/17/1982     MAMMO SCREENING  03/28/2013     ZOSTER IMMUNIZATION (2 of 2) 10/22/2018     LIPID  12/19/2020     A1C  06/14/2021     PHQ-9  06/28/2021     BMP  12/14/2021     MICROALBUMIN  12/14/2021     DIABETIC FOOT EXAM  12/14/2021     DTAP/TDAP/TD IMMUNIZATION (3 - Td) 08/27/2028     HEPATITIS C SCREENING   Completed     DEPRESSION ACTION PLAN  Completed     INFLUENZA VACCINE  Completed     Pneumococcal Vaccine: Pediatrics (0 to 5 Years) and At-Risk Patients (6 to 64 Years)  Completed     HEPATITIS B IMMUNIZATION  Completed     IPV IMMUNIZATION  Aged Out     MENINGITIS IMMUNIZATION  Aged Out         Sreedhar Parks MD  [facility]

## 2020-12-29 ASSESSMENT — ANXIETY QUESTIONNAIRES: GAD7 TOTAL SCORE: 12

## 2021-02-08 DIAGNOSIS — R06.2 WHEEZING: Primary | ICD-10-CM

## 2021-02-08 NOTE — CONFIDENTIAL NOTE
Patient calls requesting refill on ipratropium (Atrovent) 17mcg inhaler. States uses prn for wheezing which she believes is due to allergies. Thinks received this rx from telemed or urgent care in past year.  Finds has been needing/using with good results this winter for intermittent wheezing.    Would like refill from Dr. Parks if willing.     Per hx in EPIC - med was ordered 8/5/20 in virtual visit with [place] provider for possible COVID19.   Per EPIC outside med dispense hx - filled once on 8/5/20 for 1 one inhaler.     Routed request to Dr. Parks.   Alicia Brennan RN

## 2021-08-22 DIAGNOSIS — I10 ESSENTIAL HYPERTENSION: ICD-10-CM

## 2021-08-23 RX ORDER — METOPROLOL SUCCINATE 100 MG/1
TABLET, EXTENDED RELEASE ORAL
Qty: 90 TABLET | Refills: 0 | Status: SHIPPED | OUTPATIENT
Start: 2021-08-23 | End: 2021-12-17

## 2021-08-23 NOTE — TELEPHONE ENCOUNTER
METOPROLOL  LOV (MEDS) 12/28/20  LAST LABS 12/14/20    PER LAST OV NOTE: Return in about 3 months (around 3/28/2021) for Follow Up.    PATIENT IS OVERDUE FOR DIABETIC CHECK    BP Readings from Last 3 Encounters:   12/28/20 107/64   12/14/20 134/86   08/26/20 125/80     Last Comprehensive Metabolic Panel:  Sodium   Date Value Ref Range Status   12/14/2020 142 134 - 144 mmol/L Final     Potassium   Date Value Ref Range Status   12/14/2020 4.8 3.5 - 5.2 mmol/L Final     Chloride   Date Value Ref Range Status   12/14/2020 103 96 - 106 mmol/L Final     Carbon Dioxide   Date Value Ref Range Status   08/13/2020 28 20 - 32 mmol/L Final     Anion Gap   Date Value Ref Range Status   08/13/2020 2 (L) 3 - 14 mmol/L Final     Glucose   Date Value Ref Range Status   12/14/2020 136 (H) 65 - 99 mg/dL Final     Urea Nitrogen   Date Value Ref Range Status   12/14/2020 21 6 - 24 mg/dL Final     BUN/Creatinine Ratio   Date Value Ref Range Status   12/14/2020 32 (H) 9 - 23 Final     Creatinine   Date Value Ref Range Status   12/14/2020 0.66 0.57 - 1.00 mg/dL Final     GFR Estimate   Date Value Ref Range Status   08/13/2020 76 >60 mL/min/[1.73_m2] Final     Comment:     Non  GFR Calc  Starting 12/18/2018, serum creatinine based estimated GFR (eGFR) will be   calculated using the Chronic Kidney Disease Epidemiology Collaboration   (CKD-EPI) equation.       Calcium   Date Value Ref Range Status   12/14/2020 9.0 8.7 - 10.2 mg/dL Final

## 2021-09-03 ENCOUNTER — OFFICE VISIT (OUTPATIENT)
Dept: FAMILY MEDICINE | Facility: CLINIC | Age: 61
End: 2021-09-03

## 2021-09-03 VITALS
HEART RATE: 76 BPM | DIASTOLIC BLOOD PRESSURE: 72 MMHG | SYSTOLIC BLOOD PRESSURE: 112 MMHG | BODY MASS INDEX: 47.09 KG/M2 | HEIGHT: 66 IN | RESPIRATION RATE: 20 BRPM | WEIGHT: 293 LBS | OXYGEN SATURATION: 96 %

## 2021-09-03 DIAGNOSIS — Z85.42 HISTORY OF UTERINE CANCER: ICD-10-CM

## 2021-09-03 DIAGNOSIS — E66.01 MORBID OBESITY (H): ICD-10-CM

## 2021-09-03 DIAGNOSIS — I10 ESSENTIAL HYPERTENSION: ICD-10-CM

## 2021-09-03 DIAGNOSIS — Z12.31 ENCOUNTER FOR SCREENING MAMMOGRAM FOR MALIGNANT NEOPLASM OF BREAST: ICD-10-CM

## 2021-09-03 DIAGNOSIS — E11.9 TYPE 2 DIABETES MELLITUS WITHOUT COMPLICATION, WITHOUT LONG-TERM CURRENT USE OF INSULIN (H): Primary | ICD-10-CM

## 2021-09-03 DIAGNOSIS — Z12.11 SCREEN FOR COLON CANCER: ICD-10-CM

## 2021-09-03 DIAGNOSIS — Z90.710 HISTORY OF TOTAL ABDOMINAL HYSTERECTOMY: ICD-10-CM

## 2021-09-03 DIAGNOSIS — R60.0 PERIPHERAL EDEMA: ICD-10-CM

## 2021-09-03 DIAGNOSIS — I47.10 SVT (SUPRAVENTRICULAR TACHYCARDIA) (H): ICD-10-CM

## 2021-09-03 DIAGNOSIS — F33.2 SEVERE EPISODE OF RECURRENT MAJOR DEPRESSIVE DISORDER, WITHOUT PSYCHOTIC FEATURES (H): ICD-10-CM

## 2021-09-03 LAB — HBA1C MFR BLD: 7.6 % (ref 4–6)

## 2021-09-03 PROCEDURE — 83036 HEMOGLOBIN GLYCOSYLATED A1C: CPT | Performed by: FAMILY MEDICINE

## 2021-09-03 PROCEDURE — 99215 OFFICE O/P EST HI 40 MIN: CPT | Performed by: FAMILY MEDICINE

## 2021-09-03 PROCEDURE — 36415 COLL VENOUS BLD VENIPUNCTURE: CPT | Performed by: FAMILY MEDICINE

## 2021-09-03 RX ORDER — SERTRALINE HYDROCHLORIDE 100 MG/1
100 TABLET, FILM COATED ORAL DAILY
Qty: 90 TABLET | Refills: 0 | Status: SHIPPED | OUTPATIENT
Start: 2021-09-03 | End: 2021-11-29

## 2021-09-03 ASSESSMENT — MIFFLIN-ST. JEOR: SCORE: 2376.19

## 2021-09-03 NOTE — PROGRESS NOTES
"  Ashok Thompson is a 61 year old patient who presents to clinic for follow up.  She has not been doing well.  Eating more, more sedentary, more depressed.  Denies SI.  Has not started therapy but did make an appointment in 1 month.   diagnosed with recurrent liver cancer--terminal    MDD: not controlled. See above.  On sertraline 50 mg.      DM2: not on meds. A1c increased.  Asymptomatic.    Morbid obesity: motivated to change    HTN: at goal on current medication    SVT: on metoprolol    Review of Systems   Constitutional, HEENT, cardiovascular, pulmonary, GI, , musculoskeletal, neuro, skin, endocrine and psych systems are negative, except as otherwise noted.      Objective    /72   Pulse 76   Resp 20   Ht 1.676 m (5' 6\")   Wt (!) 178.9 kg (394 lb 8 oz)   SpO2 96%   BMI 63.67 kg/m      General: Well appearing, NAD  Psych: depressed mood and affect.  No SI.  Tearful        Results for orders placed or performed in visit on 09/03/21 (from the past 24 hour(s))   Hemoglobin A1C ([facility])   Result Value Ref Range    Hemoglobin A1C 7.6 (A) 4.0 - 6.0 %       Assessment & Plan     Type 2 diabetes mellitus without complication, without long-term current use of insulin (H)  Not at goal.  Focus on lifestyle changes.  Discuss metformin at follow up to limit changes this visit  - Hemoglobin A1C ([facility])  - VENOUS COLLECTION  - Basic Metabolic Panel (8) ([facility])  - Microalbumin ([facility])  - Comprehensive Weight Management; Future    Screen for colon cancer  - ROGERS([employer])    Encounter for screening mammogram for malignant neoplasm of breast  - MAMMO -  Screening Digital Bilateral ([facility]); Future    Morbid obesity (H)  Discussed referral.  May be good surgical candidate.  - Comprehensive Weight Management; Future    Severe episode of recurrent major depressive disorder, without psychotic features (H)  Increase dose.  Recheck 1 month.  Consider adding bupropion  - sertraline (ZOLOFT) " "100 MG tablet; Take 1 tablet (100 mg) by mouth daily    Peripheral edema  - cont furosemide  - Compression Sleeve/Stocking Order for DME - ONLY FOR DME    SVT (supraventricular tachycardia) (H)  - cont metoprolol    History of uterine cancer    History of total abdominal hysterectomy    Essential hypertension  At goal        45 minutes spent on the date of the encounter doing chart review, patient visit and documentation        BMI:   Estimated body mass index is 63.67 kg/m  as calculated from the following:    Height as of this encounter: 1.676 m (5' 6\").    Weight as of this encounter: 178.9 kg (394 lb 8 oz).   Weight management plan: Patient referred to endocrine and/or weight management specialty    See Patient Instructions    Return in about 4 weeks (around 10/1/2021) for Physical Exam.    Sreedhar Parks MD  [facility]          "

## 2021-09-04 LAB
BUN SERPL-MCNC: 19 MG/DL (ref 8–27)
BUN/CREATININE RATIO: 26 (ref 12–28)
CALCIUM SERPL-MCNC: 8.7 MG/DL (ref 8.7–10.3)
CHLORIDE SERPLBLD-SCNC: 100 MMOL/L (ref 96–106)
CREAT SERPL-MCNC: 0.72 MG/DL (ref 0.57–1)
EGFR IF AFRICN AM: 105 ML/MIN/1.73
EGFR IF NONAFRICN AM: 91 ML/MIN/1.73
GLUCOSE SERPL-MCNC: 193 MG/DL (ref 65–99)
POTASSIUM SERPL-SCNC: 4.4 MMOL/L (ref 3.5–5.2)
SODIUM SERPL-SCNC: 138 MMOL/L (ref 134–144)
TOTAL CO2: 26 MMOL/L (ref 20–29)

## 2021-09-17 ENCOUNTER — TELEPHONE (OUTPATIENT)
Dept: FAMILY MEDICINE | Facility: CLINIC | Age: 61
End: 2021-09-17

## 2021-11-09 ENCOUNTER — TELEPHONE (OUTPATIENT)
Dept: FAMILY MEDICINE | Facility: CLINIC | Age: 61
End: 2021-11-09

## 2021-11-09 NOTE — TELEPHONE ENCOUNTER
11/9/21 TCB--Received fax from [facility].  They have not received the patient kit back yet.  Order was placed 9/5/21 with Dr. Parks.    Just wondering if the patient is going to still do the test or if she has changed her mind?    Yuri Early,   [facility]  [phone]

## 2021-11-09 NOTE — PROGRESS NOTES
11/9/21 TCB--Received fax from [facility].  They have not received the patient kit back yet.  Order was placed 9/5/21 with Dr. Parks.    Just wondering if the patient is going to still do the test or if she has changed her mind?    Yuri Early,   [facility]  [phone]

## 2021-11-17 DIAGNOSIS — R60.0 LOCALIZED EDEMA: ICD-10-CM

## 2021-11-17 DIAGNOSIS — I10 ESSENTIAL HYPERTENSION: ICD-10-CM

## 2021-11-17 DIAGNOSIS — I10 BENIGN ESSENTIAL HYPERTENSION: ICD-10-CM

## 2021-11-17 DIAGNOSIS — I47.10 SVT (SUPRAVENTRICULAR TACHYCARDIA) (H): ICD-10-CM

## 2021-11-17 RX ORDER — FUROSEMIDE 20 MG
TABLET ORAL
Qty: 90 TABLET | Refills: 3 | Status: SHIPPED | OUTPATIENT
Start: 2021-11-17 | End: 2023-02-14

## 2021-11-29 DIAGNOSIS — F33.2 SEVERE EPISODE OF RECURRENT MAJOR DEPRESSIVE DISORDER, WITHOUT PSYCHOTIC FEATURES (H): ICD-10-CM

## 2021-11-29 RX ORDER — SERTRALINE HYDROCHLORIDE 100 MG/1
TABLET, FILM COATED ORAL
Qty: 90 TABLET | Refills: 0 | Status: SHIPPED | OUTPATIENT
Start: 2021-11-29 | End: 2021-12-13

## 2021-11-29 NOTE — TELEPHONE ENCOUNTER
SERTRALINE  LOV (MEDS) 9/3/21    APPT SCHEDULED FOR 12/1/21    PHQ 5/24/2019 7/15/2020 12/28/2020   PHQ-9 Total Score 0 16 15   Q9: Thoughts of better off dead/self-harm past 2 weeks Not at all Not at all Not at all

## 2021-12-09 ENCOUNTER — OFFICE VISIT (OUTPATIENT)
Dept: FAMILY MEDICINE | Facility: CLINIC | Age: 61
End: 2021-12-09

## 2021-12-09 VITALS
DIASTOLIC BLOOD PRESSURE: 78 MMHG | HEIGHT: 66 IN | OXYGEN SATURATION: 97 % | BODY MASS INDEX: 47.09 KG/M2 | RESPIRATION RATE: 20 BRPM | HEART RATE: 76 BPM | SYSTOLIC BLOOD PRESSURE: 118 MMHG | WEIGHT: 293 LBS

## 2021-12-09 DIAGNOSIS — R39.9 LOWER URINARY TRACT SYMPTOMS (LUTS): Primary | ICD-10-CM

## 2021-12-09 DIAGNOSIS — E11.9 TYPE 2 DIABETES MELLITUS WITHOUT COMPLICATION, WITHOUT LONG-TERM CURRENT USE OF INSULIN (H): ICD-10-CM

## 2021-12-09 LAB
BACTERIA URINE: ABNORMAL
BILIRUB UR QL STRIP: ABNORMAL
BLOOD URINE DIP: ABNORMAL
CASTS/LPF: ABNORMAL
COLOR UR: YELLOW
CRYSTAL URINE: ABNORMAL
EPITHELIAL CELLS - QUEST: ABNORMAL
GLUCOSE UR STRIP-MCNC: ABNORMAL MG/DL
KETONES UR QL STRIP: ABNORMAL
LEUKOCYTE ESTERASE URINE DIP: ABNORMAL
MUCOUS URINE: ABNORMAL
NITRITE UR QL STRIP: ABNORMAL
PH UR STRIP: 6 PH (ref 5–9)
PROT UR QL: ABNORMAL MG/DL (ref ?–0.01)
RBC URINE: ABNORMAL (ref 0–3)
SP GR UR STRIP: 1.01 (ref 1–1.02)
UROBILINOGEN UR QL STRIP: 0.2 EU/DL (ref 0.2–1)
WBC URINE: ABNORMAL (ref 0–3)

## 2021-12-09 PROCEDURE — 81003 URINALYSIS AUTO W/O SCOPE: CPT | Performed by: FAMILY MEDICINE

## 2021-12-09 PROCEDURE — 99214 OFFICE O/P EST MOD 30 MIN: CPT | Performed by: FAMILY MEDICINE

## 2021-12-09 RX ORDER — METFORMIN HCL 500 MG
500 TABLET, EXTENDED RELEASE 24 HR ORAL
Qty: 60 TABLET | Refills: 4 | Status: SHIPPED | OUTPATIENT
Start: 2021-12-09 | End: 2022-09-13

## 2021-12-09 ASSESSMENT — MIFFLIN-ST. JEOR: SCORE: 2342.74

## 2021-12-09 NOTE — PROGRESS NOTES
Problem(s) Oriented visit        SUBJECTIVE:                                                    Donna Elmore is a 61 year old female who presents to clinic today for the following health issues :  tonal problems for provider to add (Optional):[id number]}    1. Lower urinary tract symptoms (LUTS)  Itchy and had sjust been treated with antifungal  - Urinalysis ([facility])  - Urine Culture  Routine ([facility])    2. Type 2 diabetes mellitus without complication, without long-term current use of insulin (H)  Diabetes  she  reports Diabetic diet compliance:  compliant most of the time  Medications: no  Home blood sugar monitoring: are NOT performed  As a direct cause of their history of diabetes they have the following Diabetic complications: none    Most  recent A1C: Smoking: BP:   Lab Results   Component Value Date    A1C 7.6 09/03/2021    A1C 6.8 12/14/2020    A1C 5.8 12/19/2019    History   Smoking Status     Never Smoker   Smokeless Tobacco     Never Used    BP Readings from Last 1 Encounters:   12/09/21 118/78        Kidney studies:  Creatinine   Date Value Ref Range Status   09/03/2021 0.72 0.57 - 1.00 mg/dL Final   12/14/2020 0.66 0.57 - 1.00 mg/dL Final   08/13/2020 0.84 0.52 - 1.04 mg/dL Final   ]    GFR Estimate   Date Value Ref Range Status   08/13/2020 76 >60 mL/min/[1.73_m2] Final     Comment:     Non  GFR Calc  Starting 12/18/2018, serum creatinine based estimated GFR (eGFR) will be   calculated using the Chronic Kidney Disease Epidemiology Collaboration   (CKD-EPI) equation.                  No components found for: MICORALBUMINLC      GFR Estimate If Black   Date Value Ref Range Status   08/13/2020 88 >60 mL/min/[1.73_m2] Final     Comment:      GFR Calc  Starting 12/18/2018, serum creatinine based estimated GFR (eGFR) will be   calculated using the Chronic Kidney Disease Epidemiology Collaboration   (CKD-EPI) equation.                Statin and ASA:  Current Outpatient  Medications   Medication     ascorbic acid (VITAMIN C) 1000 MG TABS     ELDERBERRY PO     furosemide (LASIX) 20 MG tablet     glucosamine-chondroitin 500-400 MG CAPS     ibuprofen (ADVIL) 200 MG tablet     ipratropium (ATROVENT HFA) 17 MCG/ACT inhaler     magnesium 250 MG tablet     metFORMIN (GLUCOPHAGE-XR) 500 MG 24 hr tablet     metoprolol succinate ER (TOPROL-XL) 100 MG 24 hr tablet     Multiple Vitamins-Minerals (AIRBORNE PO)     Probiotic Product (PROBIOTIC DAILY PO)     VITAMIN D, CHOLECALCIFEROL, PO     Zinc 50 MG CAPS     sertraline (ZOLOFT) 100 MG tablet     No current facility-administered medications for this visit.       - metFORMIN (GLUCOPHAGE-XR) 500 MG 24 hr tablet; Take 1 tablet (500 mg) by mouth daily (with dinner)  Dispense: 60 tablet; Refill: 4  - Comp. Metabolic Panel (14) ([facility])  - Hemoglobin A1C ([facility])       Problem list, Medication list, Allergies, and Medical/Social/Surgical histories reviewed in [facility] and updated as appropriate.   Additional history: as documented    ROS:  General and Resp. completed and negative except as noted above    Histories:   Patient Active Problem List   Diagnosis     SVT (supraventricular tachycardia) (H)     Major depressive disorder, recurrent episode, mild (H)     Osteoarthritis of left knee     HTN (hypertension)     Morbid obesity (H)     Type 2 diabetes mellitus without complication, without long-term current use of insulin (H)     H/O: hysterectomy     Past Surgical History:   Procedure Laterality Date     ------------OTHER-------------  7-2011    cyst removal - back     CATHETER, ABLATION  1-2010    tachycardia ablation     D & C  3-2010     HYSTERECTOMY RADICAL  3-2010     LAPAROSCOPIC APPENDECTOMY  9/27/2013    Procedure: LAPAROSCOPIC APPENDECTOMY;  LAPAROSCOPIC APPENDECTOMY;  Surgeon: Noreen Brambila MD;  Location:  OR       Social History     Tobacco Use     Smoking status: Never Smoker     Smokeless tobacco: Never Used   Substance Use Topics  "    Alcohol use: No     Family History   Problem Relation Age of Onset     Heart Disease Mother         pacemaker, heart failure     Thyroid Disease Mother      Hypertension Mother      Cancer Father         kidney     Heart Disease Father      Hypertension Father      Thyroid Disease Sister            OBJECTIVE:                                                    /78   Pulse 76   Resp 20   Ht 1.676 m (5' 6\")   Wt (!) 175.6 kg (387 lb 2 oz)   SpO2 97%   BMI 62.48 kg/m    Body mass index is 62.48 kg/m .   APPEARANCE: = Relaxed and in no distress     ASSESSMENT/PLAN:                                                        Donna was seen today for urinary problem.    Diagnoses and all orders for this visit:    Lower urinary tract symptoms (LUTS)  -     Urinalysis ([facility])  -     Urine Culture  Routine ([facility])    Type 2 diabetes mellitus without complication, without long-term current use of insulin (H)  -     metFORMIN (GLUCOPHAGE-XR) 500 MG 24 hr tablet; Take 1 tablet (500 mg) by mouth daily (with dinner)  -     Comp. Metabolic Panel (14) ([facility])  -     Hemoglobin A1C ([facility])        Work on weight loss  Regular exercise  There are no Patient Instructions on file for this visit.    The following health maintenance items are reviewed in Epic and correct as of today:  Health Maintenance   Topic Date Due     PREVENTIVE CARE VISIT  Never done     ADVANCE CARE PLANNING  Never done     EYE EXAM  Never done     COLORECTAL CANCER SCREENING  Never done     HIV SCREENING  Never done     MAMMO SCREENING  03/28/2013     ZOSTER IMMUNIZATION (2 of 2) 10/22/2018     LIPID  12/19/2020     PHQ-9  06/28/2021     MICROALBUMIN  12/14/2021     DIABETIC FOOT EXAM  12/14/2021     A1C  03/03/2022     BMP  09/03/2022     Pneumococcal Vaccine: Pediatrics (0 to 5 Years) and At-Risk Patients (6 to 64 Years) (2 of 2 - PPSV23) 03/17/2026     DTAP/TDAP/TD IMMUNIZATION (3 - Td or Tdap) 08/27/2028     HEPATITIS C SCREENING  Completed     " DEPRESSION ACTION PLAN  Completed     INFLUENZA VACCINE  Completed     COVID-19 Vaccine  Completed     IPV IMMUNIZATION  Aged Out     MENINGITIS IMMUNIZATION  Aged Out     PAP  Discontinued       Dennis Narayanan MD  [facility]  [phone]    For any issues my office # is [phone]

## 2021-12-09 NOTE — PATIENT INSTRUCTIONS
"TYPE 2 DIABETES OVERVIEW -- Diet and physical activity are critically important in the management of the ABCs (A1c, Blood pressure and Cholesterol) of type 2 diabetes.  To effectively manage A1C (hemoglobin A1C) and blood sugar levels, it is important to understand how to balance food intake, physical activity, and medication. Making healthy food choices every day has both immediate and long-term effects. With education, practice, and assistance from a dietitian and/or a diabetes educator, it is possible to eat well and control diabetes.  This article discusses diet in the management of type 2 diabetes. The role of diet and activity in managing blood pressure and cholesterol are reviewed separately. (See \"Patient information: High blood pressure, diet, and weight (Beyond the Basics)\" and \"Patient information: High cholesterol and lipids (hyperlipidemia) (Beyond the Basics)\".)  Articles that discuss other aspects of type 2 diabetes are also available. (See \"Patient information: Diabetes mellitus type 2: Overview (Beyond the Basics)\" and \"Patient information: Diabetes mellitus type 2: Insulin treatment (Beyond the Basics)\" and \"Patient information: Self-blood glucose monitoring in diabetes mellitus (Beyond the Basics)\" and \"Patient information: Hypoglycemia (low blood sugar) in diabetes mellitus (Beyond the Basics)\" and \"Patient information: Diabetes mellitus type 2: Alcohol, exercise, and medical care (Beyond the Basics)\" and \"Patient information: Preventing complications in diabetes mellitus (Beyond the Basics)\".)  WHY IS DIET IMPORTANT? -- Many factors affect how well diabetes is controlled. Many of these factors are controlled by the person with diabetes, including how much and what is eaten, how frequently the blood sugar is monitored, physical activity levels, and accuracy and consistency of medication dosing. Even small changes can affect blood sugar control.  Eating a consistent amount of food every day and " "taking medications as directed can greatly improve blood sugar control and decrease the risk of diabetes-related complications, such as coronary artery disease, kidney disease, and nerve damage. In addition, these measures impact weight control. A dietitian can help to create a food plan that is tailored to a person's medical needs, lifestyle, and personal preferences.  TYPE 2 DIABETES AND MEAL TIMING -- Consistently eating at the same times every day is important for some people, especially those who take long-acting insulin (eg, NPH) and oral medications that decrease blood sugar levels (sulfonylureas or meglitinides). If a meal is skipped or delayed while on these regimens, you are at risk for developing low blood glucose.  People who use intensive insulin therapy (those on multiple daily injections) and people who take other types of oral diabetes medications (eg, insulin sensitizers such as metformin) have more flexibility around meal timing. With these regimens, skipping or delaying a meal does not usually increase the risk of low blood sugar.  Foods or meals that are high in fat (eg, pizza) may be eaten occasionally, although blood sugar levels should be monitored more closely. High-fat meals are broken down more slowly than low-fat meals. When using rapid acting insulin before a meal, the blood sugar level may become low shortly after eating a high fat meal and then rise hours later.  Weight loss -- Many people with type 2 diabetes are overweight. Losing even a small amount of weight (5 to 10 percent of total body weight) can help the body to produce and use insulin more efficiently. In fact, eating fewer calories can reduce blood sugar levels even before the first pound is lost.  There are several strategies that can aid in weight loss, including eating fewer calories, exercise, weight loss medications, and weight loss surgery. These treatments are discussed in detail in separately. (See \"Patient " "information: Weight loss treatments (Beyond the Basics)\".)  Recommended calorie intake -- The number of calories needed to maintain weight depends upon your age, sex, height, weight, and activity level. In general:  Men, active women - 15 carolina/lb   Most women, sedentary men, and adults over 55 years - 13 carolina/lb   Sedentary women, obese adults - 10 carolina/lb   Pregnant, lactating women - 15 to 17 carolina/lb  To lose 1 to 2 pounds per week (a safe rate of weight loss), subtract 500 to 1000 calories from the total number of calories needed to maintain weight.  As an example, an overweight man who weighs 250 lbs would need to eat 2500 calories per day to maintain his weight. To lose 1 to 2 pounds per week, he should eat 1500 to 2000 calories per day. As weight is lost, the recommended calorie intake should be recalculated.  TYPE 2 DIABETES, DIET, AND WEIGHT -- Your weight is a direct reflection of how much you eat and how active you are. Eating a consistent number of calories every day can help to control blood glucose levels and maintain body weight. In people who are overweight or obese, losing weight by eating fewer calories or increasing activity levels can improve blood sugar control, and lower blood pressure and cholesterol levels.  Avoiding weight gain -- Weight gain is a potential side effect of intensive insulin therapy in type 2 diabetes. Weight gain is also a side effect of some oral medications used for people with type 2 diabetes. To avoid weight gain, the following tips are recommended.  Measure your weight on a regular basis (eg, once weekly). Weight gains of more than 2 to 3 pounds indicate a need to decrease the amount you eat or increase activity. Do not wait until weight increases by 10 or more pounds to take action.   As blood glucose control improves, it may be necessary to decrease your calorie intake by 250 to 300 calories per day to avoid weight gain.   If blood glucose levels are frequently low at a " "particular time of day, decrease the insulin dose or medication dose rather than add a snack.  Exercise -- Exercising regularly can help to lose weight and keep it off. The recommended amount of exercise is 30 minutes per day most days of the week. (See \"Patient information: Exercise (Beyond the Basics)\".)  People who take insulin or oral medications that lower blood sugar levels should check their blood glucose level before and after exercising. If exercise is vigorous and prolonged (more than 30 minutes), check the blood glucose every 15 minutes (if the exercise regimen is new and will be used again). Frequent monitoring can help to get a sense of what effect exercise has on the blood glucose level.  If your blood sugar level becomes low during exercise, eat a snack according to the guidelines below. (See \"Patient information: Hypoglycemia (low blood sugar) in diabetes mellitus (Beyond the Basics)\".)  If the blood glucose is 51 to 70 mg/dL (2.8 to 3.8 mmol/L), eat 10 to 15 grams of fast-acting carbohydrate (eg, 1/2 cup fruit juice, 6 to 8 hard candies, 3 to 4 glucose tablets).   If the level is less than 50 mg/dL (2.7 mmol/L), eat 20 to 30 grams of fast-acting carbohydrates.  Retest after 15 minutes and repeat treatment if blood glucose is still too low. If your next meal is more than an hour away, eat an additional 15 grams of carbohydrate and 1 ounce of protein (for example, crackers with cheese or one-half of a sandwich with peanut butter). It is important not to eat too much because this can raise blood sugar levels above the target level and lead to weight gain over the long term.  Adjusting insulin dose for exercise -- People who take oral diabetes medications usually do not need to adjust the dose of these medications for exercise.  If you take insulin, it may be possible to reduce your insulin dose before exercising to avoid developing low blood glucose. A physician, diabetes educator, dietitian or " exercise physiologist can help to determine the best way to adjust your insulin dose before, during, and after exercising.  TYPE 2 DIABETES AND ALCOHOL -- Drinking a moderate amount of alcohol (up to 1 serving per day for women, up to 2 servings per day for men) with food does not affect blood glucose levels significantly. Alcohol may cause a slight rise in blood glucose, followed hours later by a decrease in the blood glucose level. As a result, it is important to monitor blood glucose response to alcohol to determine if any changes in insulin doses are needed.  Mixers, such as fruit juice or regular cola, can increase blood glucose levels and increase the number of calories consumed in a day. Also, calories from alcohol have little nutritional value and may interfere with efforts to lose weight or contribute to weight gain. If you take oral diabetes medications, you will not need to adjust your medication, as long as you drink the alcohol in moderation and with food.  TYPE 2 DIABETES AND CARBOHYDRATE CONSISTENCY -- Carbohydrates are the main energy source in the diet, and include starches, vegetables, fruits, dairy products, and sugars. Most meats and fats do not contain any carbohydrates.  Carbohydrates have a direct impact on the blood glucose level whereas proteins and fat have little to no impact. Eating a consistent amount of carbohydrates at each meal can help to control blood glucose levels, especially in people who take oral diabetes medications or long-acting insulin (eg, NPH).  People with type 2 diabetes should focus on reducing calories and increasing physical activity, especially when newly diagnosed or if the pancreas is still producing some insulin. People who have type 2 diabetes and are lean or not interested in losing weight loss could consider a focus on maintaining their weight with carbohydrate counting. Carbohydrate counting may also be helpful for those who are on multiple daily  injections.  Carbohydrate counting -- A dietitian usually helps to determine the number of carbohydrates you need at each meal and snack, based upon an individual's usual eating habits, diabetes medications, body weight, nutritional goals, and activity level. In most people, between 45 and 65 percent of the day's total calories should come from carbohydrates. The way carbohydrates are divided up for each meal or snack is based upon personal preferences, meal timing and spacing, and type of diabetes medications (table 1).  The number of carbohydrates in a food can be determined by reading the nutrition label, consulting a reference book or website, carrying a database on a personal  (PDA), or using the Exchange system. Restaurants usually have this information available upon request. (See 'Where to get more information' below.)  It is important to note the serving size and grams of fiber when calculating carbohydrates. Eating more than one serving will increase the number of calories consumed and the dose of insulin needed to cover the meal. For example, some pre-packaged snacks contain two or more servings. To calculate the carbohydrate content of the entire package, multiply the number of servings by the number of carbohydrates per serving.  When a serving of food has more than 5 grams of fiber, the grams of fiber should be subtracted from the grams of carbohydrates to calculate the insulin dose (figure 1).  Exchange planning -- With exchange planning, all foods are categorized as either a carbohydrate, meat or meat substitute, or fat. In this system, one serving of a carbohydrate (eg, one small apple) can be exchanged for any other carbohydrate (eg, 1/3 cup cooked pasta), because both portions contain about 15 grams of carbohydrate. You can also easily determine the carbohydrate content of your meals and snacks using the Exchange system (table 2).   The exchange lists also identify foods that are  good sources of fiber, and foods that have a high sodium content. A dietitian can help you determine how many servings of each group should be eaten at each meal and snack (table 2) and the typical carbohydrate content of each meal and snack.  Intensive insulin therapy -- People who take multiple injections of rapid-acting insulin per day can adjust their pre-meal insulin dose based upon the number of carbohydrates they plan to eat and their pre-meal blood glucose. This requires the person to perform basic arithmetic.  The pre-meal insulin dose is calculated by dividing the number of carbohydrates to be consumed by the number of carbohydrates covered by one unit of insulin (insulin-to-carbohydrate ratio). This dose is then adjusted based upon the pre-meal blood glucose reading (see below).  Insulin-to-carbohydrate ratio -- An insulin-to-carbohydrate ratio is determined by a dietitian or diabetes educator. This allows the person to calculate the dose of rapid-acting insulin needed to cover a meal or snack.    For example, if the insulin-to-carbohydrate ratio is 1 to 10, the person would give 1 unit of insulin for every 10 grams of carbohydrate consumed. If the person ate a meal with 70 grams of carbohydrates, the dose of rapid-acting insulin would be 7 units.   Correction factor -- The pre-meal insulin dose can also be adjusted based upon the pre-meal blood glucose level; this is called a correction factor. The correction factor can be determined by a dietitian or diabetes educator.    For example, let's assume that the correction factor is 30. If the pre-meal blood glucose was 240 mg/dL and the goal BG was 120 mg/dL, take 240 minus 120 = 120. Then 120 divided by 30 = 4 extra units of insulin to correct the high blood glucose level.  WHAT SHOULD I EAT? -- While protein and fat do not affect blood glucose levels significantly, they do contribute to the number of calories consumed. Eating a consistent number of  "calories every day can help to maintain body weight. An individual's recommended calorie intake is discussed below. (See 'Recommended calorie intake' above.)  General recommendations -- The American Diabetes Association recommends the following to help manage the ABCs (A1c, Blood pressure and Cholesterol) and promote good health:  Between 25 and 35 percent of calories per day should be from fat, and less than 7 percent of calories per day should be from saturated fat; there should be minimal trans fat. Saturated and trans fats are found in solid fats like cheese, red meats, butter, margarine, and shortening. People with diabetes are at increased risk for heart disease and stroke, and eating a diet low in saturated and trans fats and cholesterol can help to reduce cholesterol levels and decrease these risks.   Total cholesterol intake should be less than 200 mg per day. The main sources of cholesterol in the diet are foods such as organ meats and egg yolks. Shrimp and squid are also moderately high in cholesterol but can be included in your diet occasionally because they are low in fat.   Between 15 and 20 percent of calories should be from protein, except in people with certain kidney problems (chronic kidney disease or CKD). People with CKD are sometimes advised to eat a low-protein diet. (See \"Patient information: Chronic kidney disease (Beyond the Basics)\".)   A diet that is high in fiber (25 to 30 grams per day) may help to control blood glucose levels and hemoglobin A1c. (See \"Patient information: High-fiber diet (Beyond the Basics)\".)   A diet that is low in sodium (less than 1500 mg per day) and that is high in fruits, vegetables, and low fat dairy products, is recommended and can help manage blood pressure. For people with diabetes and heart failure, a low sodium diet may reduce symptoms. (See \"Patient information: Low sodium diet (Beyond the Basics)\".)   Artificial sweeteners do not affect blood glucose " "levels and may be consumed in moderation. The [place] Food and Drug Administration (FDA) has tested and approved five artificial sweeteners: aspartame (Equal, NutraSweet), saccharin (Sweet'N Low, Sweet Twin), acesulfame-K (Sunnet, Sweet One), neotame, and sucralose (Splenda). Stevia (sometimes called Rebaudioside A or rebiana) comes from the stevia plant and is now generally recognized as safe by the FDA as a food additive and table top sweetener. When something is generally recognized as safe by the FDA, it means that experts have agreed that it is safe for use by the public in appropriate amounts.    Sugar alcohols (sorbitol, xylitol, lactitol, mannitol, and maltitol) are often used to chin sugar-free candies and gum, and increase blood glucose levels slightly. When calculating the carbohydrate content of foods, one-half of the sugar alcohol content should be counted in the total carbohydrate content of the food. Eating too much sugar alcohol at one time can cause cramping, gas, and diarrhea.   Previously, people with diabetes were told to avoid all foods with added sugar. This is no longer recommended, although sugar should be eaten in moderation. If you take insulin, you should calculate your dose based upon the total number of carbohydrates in the food, which includes the sugar content, as described above. (See 'Carbohydrate counting' above.)   Products that are \"sugar-free\" or \"fat-free\" do not necessarily have a reduced number of calories or carbohydrates. Read the nutrition label carefully and compare it to other similar products that are not sugar- or fat-free to determine which has the best balance of serving size and number of calories, carbohydrates, fat, and fiber.  Some sugar-free foods, such as diet soda, sugar-free gelatin, and sugar-free gum, do not have a significant number of calories or carbohydrates, and are considered \"free foods.\" Any food that has less than 20 calories and 5 grams of " carbohydrate is considered a free food, meaning that they do not affect body weight or require additional medication.

## 2021-12-12 LAB
ALBUMIN SERPL-MCNC: 4.3 G/DL (ref 3.8–4.9)
ALBUMIN/GLOB SERPL: 2 {RATIO} (ref 1.2–2.2)
ALP SERPL-CCNC: 107 IU/L (ref 44–121)
ALT SERPL-CCNC: 31 IU/L (ref 0–32)
AST SERPL-CCNC: 24 IU/L (ref 0–40)
BILIRUB SERPL-MCNC: 0.5 MG/DL (ref 0–1.2)
BUN SERPL-MCNC: 16 MG/DL (ref 8–27)
BUN/CREATININE RATIO: 16 (ref 12–28)
CALCIUM SERPL-MCNC: 9.2 MG/DL (ref 8.7–10.3)
CHLORIDE SERPLBLD-SCNC: 98 MMOL/L (ref 96–106)
CREAT SERPL-MCNC: 0.98 MG/DL (ref 0.57–1)
EGFR IF AFRICN AM: 73 ML/MIN/1.73
EGFR IF NONAFRICN AM: 63 ML/MIN/1.73
GLOBULIN, TOTAL: 2.2 G/DL (ref 1.5–4.5)
GLUCOSE SERPL-MCNC: 421 MG/DL (ref 65–99)
POTASSIUM SERPL-SCNC: 4.8 MMOL/L (ref 3.5–5.2)
PROT SERPL-MCNC: 6.5 G/DL (ref 6–8.5)
SODIUM SERPL-SCNC: 134 MMOL/L (ref 134–144)
TOTAL CO2: 19 MMOL/L (ref 20–29)

## 2021-12-13 ENCOUNTER — OFFICE VISIT (OUTPATIENT)
Dept: FAMILY MEDICINE | Facility: CLINIC | Age: 61
End: 2021-12-13

## 2021-12-13 ENCOUNTER — OFFICE VISIT (OUTPATIENT)
Dept: PHARMACY | Facility: PHYSICIAN GROUP | Age: 61
End: 2021-12-13
Payer: COMMERCIAL

## 2021-12-13 VITALS
BODY MASS INDEX: 62.01 KG/M2 | RESPIRATION RATE: 18 BRPM | OXYGEN SATURATION: 93 % | TEMPERATURE: 97.9 F | WEIGHT: 293 LBS

## 2021-12-13 VITALS — SYSTOLIC BLOOD PRESSURE: 118 MMHG | DIASTOLIC BLOOD PRESSURE: 72 MMHG | BODY MASS INDEX: 62.01 KG/M2 | HEIGHT: 66 IN

## 2021-12-13 DIAGNOSIS — Z12.31 ENCOUNTER FOR SCREENING MAMMOGRAM FOR MALIGNANT NEOPLASM OF BREAST: ICD-10-CM

## 2021-12-13 DIAGNOSIS — I10 PRIMARY HYPERTENSION: ICD-10-CM

## 2021-12-13 DIAGNOSIS — E66.01 MORBID OBESITY (H): ICD-10-CM

## 2021-12-13 DIAGNOSIS — E11.65 TYPE 2 DIABETES MELLITUS WITH HYPERGLYCEMIA, WITHOUT LONG-TERM CURRENT USE OF INSULIN (H): Primary | ICD-10-CM

## 2021-12-13 DIAGNOSIS — Z78.9 TAKES DIETARY SUPPLEMENTS: ICD-10-CM

## 2021-12-13 DIAGNOSIS — Z12.11 SCREEN FOR COLON CANCER: ICD-10-CM

## 2021-12-13 DIAGNOSIS — L30.4 INTERTRIGO: ICD-10-CM

## 2021-12-13 DIAGNOSIS — E78.5 HYPERLIPIDEMIA LDL GOAL <100: ICD-10-CM

## 2021-12-13 DIAGNOSIS — F33.0 MAJOR DEPRESSIVE DISORDER, RECURRENT EPISODE, MILD (H): ICD-10-CM

## 2021-12-13 DIAGNOSIS — E11.9 TYPE 2 DIABETES MELLITUS WITHOUT COMPLICATION, WITHOUT LONG-TERM CURRENT USE OF INSULIN (H): Primary | ICD-10-CM

## 2021-12-13 DIAGNOSIS — I47.10 SVT (SUPRAVENTRICULAR TACHYCARDIA) (H): ICD-10-CM

## 2021-12-13 LAB — HBA1C MFR BLD: 11.9 % (ref 4–6)

## 2021-12-13 PROCEDURE — 99215 OFFICE O/P EST HI 40 MIN: CPT | Performed by: FAMILY MEDICINE

## 2021-12-13 PROCEDURE — 99607 MTMS BY PHARM ADDL 15 MIN: CPT | Performed by: PHARMACIST

## 2021-12-13 PROCEDURE — 83036 HEMOGLOBIN GLYCOSYLATED A1C: CPT | Performed by: FAMILY MEDICINE

## 2021-12-13 PROCEDURE — 99207 PR FOOT EXAM NO CHARGE: CPT | Performed by: FAMILY MEDICINE

## 2021-12-13 PROCEDURE — 99605 MTMS BY PHARM NP 15 MIN: CPT | Performed by: PHARMACIST

## 2021-12-13 PROCEDURE — 36415 COLL VENOUS BLD VENIPUNCTURE: CPT | Performed by: FAMILY MEDICINE

## 2021-12-13 RX ORDER — FLUCONAZOLE 150 MG/1
150 TABLET ORAL WEEKLY
Qty: 4 TABLET | Refills: 0 | Status: SHIPPED | OUTPATIENT
Start: 2021-12-13 | End: 2022-01-11

## 2021-12-13 NOTE — PROGRESS NOTES
Medication Therapy Management (MTM) Encounter    ASSESSMENT:                            Medication Adherence/Access: No issues identified    Type 2 Diabetes: Patient is not meeting A1c goal of < 7%. Patient would benefit from CGM, Basal Insulin (Basaglar) :  Start at dose : 10 units daily and Metformin :  Titrate dose by 1 tablet every 3 days until taking 4 tablets per day. CDE referral placed for nutrition referral. Vanda 2 monitor set up on phone with sensor placement today. Reviewed use and application. Reviewed insulin injection administration, side effects, monitoring, disposal with her today- she gave first dose in office today.     Hypertension: Patient is meeting blood pressure goal of < 140/90mmHg, but not on ace inhibitor at this time- due for microalbumin screening.     Hyperlipidemia: Patient is not on moderate intensity statin which is indicated based on 2019 ACC/AHA guidelines for lipid management, however due to complicated initial visit and topics covered, not started today. Last lipid check >2 years ago and should be rechecked for new assessment, but non-fasting last visit and was not collected. Recommend fasting check with next labs.     Supplements: Due to interaction with CGM, need to limit vitamin c doses to under 500mg, suggest going to 250mg daily or less.     PLAN:                            1. Increase metformin to 2 per day, then after day 3 increase to 3 per day for 3 days then increase to 4 per day- you can take 4 all together or split 2 in the morning and 2 at night.     2. Start Vanda 2 14 day monitor with kellen, scan sensor every 8 hours or more, decrease vitamin C to under 500mg per day.     3. Start Insulin Basaglar 10 units once daily, if Friday you are still getting sugars over 150mg/dl and no lows then increase to 12 units at bedtime. [place] sent to pharmacy.     4. Referral for CDE nutrition education. After visit summary sent with additional diet resources given amount of  "information reviewed in the office visit.     Future visit:   -Microalbumin collection  -statin  -ACE inhibitor      Follow-up: Return in 2 weeks (on 12/27/2021) for MTM visit in clinic.    SUBJECTIVE/OBJECTIVE:                          Donna Elmore is a 61 year old female coming in for an initial visit. She was referred to me from . Patient is seeing provider after our visit today.     Reason for visit: diabetes uncontrolled suddenly very elevated, here for initial education and starting insulin.    Allergies/ADRs: Reviewed in chart  Past Medical History: Reviewed in chart  Tobacco: She reports that she has never smoked. She has never used smokeless tobacco.    Medication Adherence/Access: no reported issues.     Type 2 Diabetes:  Currently taking metformin ER 500mg daily. Patient is not experiencing side effects, but only a few days so far.   Blood sugar monitoring: BG meter taught today. Ranges (patient reported): CGM started today  Symptoms of low blood sugar? none  Symptoms of high blood sugar? Polyuria, polydipsia, fatigue, vision changes  Diet/Exercise: has not been watching diet states \"have been taking care of others and not taking care of myself\"  Aspirin: not at this time  Statin: No   ACEi/ARB: No.   Lab Results   Component Value Date    A1C 11.9 12/13/2021    A1C 11.7 12/09/2021    A1C 7.6 09/03/2021    A1C 6.8 12/14/2020    A1C 5.8 12/19/2019       Hypertension/SVT: Current medications include metoprolol succinate 100mg daily.  Patient does not self-monitor blood pressure.  Patient reports no current medication side effects.  BP Readings from Last 3 Encounters:   12/13/21 118/72   12/09/21 118/78   09/03/21 112/72     Hyperlipidemia: Current therapy includes no current medications.    Over due for lipid panel.   The 10-year ASCVD risk score (Martina RODNEY Jr., et al., 2013) is: 5.3%    Values used to calculate the score:      Age: 61 years      Sex: Female      Is Non- : " No      Diabetic: Yes      Tobacco smoker: No      Systolic Blood Pressure: 118 mmHg      Is BP treated: Yes      HDL Cholesterol: 59 mg/dL      Total Cholesterol: 142 mg/dL    Recent Labs   Lab Test 12/19/19  0915 08/27/18  1133 11/05/15  0809   CHOL 142 146 117   HDL 59 56 43*   LDL 75 78 64*   TRIG 42 59 50   CHOLHDLRATIO  --   --  2.7       Supplements: Currently taking elderberry daily, glucosamine daily, magnesium daily, vitamin D, vitamin C, zinc and magnesium.  No reported issues at this time.   Patient is not interested in stopping/changing supplements. .      Today's Vitals: Temp 97.9  F (36.6  C) (Temporal)   Resp 18   Wt (!) 384 lb 3.2 oz (174.3 kg)   SpO2 93%   BMI 62.01 kg/m    ----------------      I spent 60 minutes with this patient today. All changes were made via collaborative practice agreement with Sreedhar Parks MD. A copy of the visit note was provided to the patient's primary care provider. Reviewed possible visit charges that may not be covered by insurance with patient today.     The patient was given a summary of these recommendations.     Dorothy Wylie, Pharm.D, [facility]  Medication Therapy Management Pharmacist  [phone]       Medication Therapy Recommendations  Type 2 diabetes mellitus without complication, without long-term current use of insulin (H)    Current Medication: Continuous Blood Gluc Sensor (FREESTYLE MARTY 2 SENSOR) MISC   Rationale: Medication requires monitoring - Needs additional monitoring - Effectiveness   Recommendation: Self-Monitoring - CGM recommended   Status: Accepted per CPA          Current Medication: insulin glargine (BASAGLAR KWIKPEN) 100 UNIT/ML pen   Rationale: Synergistic therapy - Needs additional medication therapy - Indication   Recommendation: Start Medication - start 10 units daily   Status: Accepted per CPA          Current Medication: metFORMIN (GLUCOPHAGE-XR) 500 MG 24 hr tablet   Rationale: Dose too low - Dosage too low - Effectiveness    Recommendation: Increase Dose - titrate dose   Status: Accepted per CPA

## 2021-12-13 NOTE — PATIENT INSTRUCTIONS
Recommendations from today's MTM visit:                                                       1. Increase metformin to 2 per day, then after day 3 increase to 3 per day for 3 days then increase to 4 per day- you can take 4 all together or split 2 in the morning and 2 at night.     2. Start Vanda 2 14 day monitor with kellen, scan sensor every 8 hours or more (help number if needed is [phone].     3. Start Insulin Basaglar 10 units once daily, if Friday you are still getting sugars over 150mg/dl and no lows then increase to 12 units at bedtime. [place] sent to pharmacy.       Follow-up: Return in 2 weeks (on 12/27/2021) for MTM visit in clinic.    It was great to speak with you today.  I value your experience and would be very thankful for your time with providing feedback on our clinic survey. You may receive a survey via email or text message in the next few days.     To schedule another MTM appointment, please call the clinic directly or you may call the MTM scheduling line at [phone] or toll-free at [phone].     My Clinical Pharmacist's contact information:                                                      Please feel free to contact me with any questions or concerns you have.      Dorothy Wylie, Pharm.D, [facility]  Medication Therapy Management Pharmacist  [phone]      Diabetes Goals:    Home Monitoring of Blood Sugars:Fasting  mg/dL and 2 hours after a meal less than 150 mg/dL.    Hemoglobin A1C: Less than 7%. Yours is   Lab Results   Component Value Date    A1C 11.9 12/13/2021   .    Things you can do to help lower your blood sugars:    Diet: 3 meals and 1-2 snacks per day with 45-60 grams of carbohydrates per meal and 15-30 grams of carbohydrates per snack. Try to fill your plate at least half-full with vegetables, fill one-quarter full with lean meats or protein, and also make sure you get at least some carbohydrate with every meal.    Exercise: 30 minutes per day of anything  that will increase your heart rate and make you break a sweat! Gardening, walking, cleaning the house, changing the oil in your car, etc. If you feel like 30 minutes per day is too much, start small. Even lifting canned foods or working your arms with a resistance band in front of the TV can help.

## 2021-12-13 NOTE — PROGRESS NOTES
"  Ashok Thompson is a 61 year old patient who presents to clinic for follow up.  She was seen recently and suspected that diabetes had worsened.  BG came back at 421, mild acidosis and ketones in urine but she felt reasonably well.  Spoke to her over the weekend.  No vomiting, abd pain.  Able to stay hydrated.  Endorses polyuria and polydipsia.  Significant erythema and pruritus of vulvovaginal area and buttocks.  Otherwise feels well.        Review of Systems   Constitutional, HEENT, cardiovascular, pulmonary, GI, , musculoskeletal, neuro, skin, endocrine and psych systems are negative, except as otherwise noted.      Objective    /72 (BP Location: Right arm)   Ht 1.676 m (5' 6\")   BMI 62.01 kg/m      General: Well appearing, NAD  Skin: bright erythema gluteal cleft and vulvovaginal and groin area.  Psych: normal mood and affect        Results for orders placed or performed in visit on 12/13/21 (from the past 24 hour(s))   Hemoglobin A1C (RMG)   Result Value Ref Range    Hemoglobin A1C POCT 11.9 (A) 4.0 - 6.0 %       Type 2 diabetes mellitus with hyperglycemia, without long-term current use of insulin (H)  Uncontrolled.  Had appt with Dorothy for initiation of lantus, uptitration metformin, BG monitoring and teaching.  Will follow up with Dorothy in 2 weeks, me in 4 weeks.    - obtain completed eye exam  - dietary changes  - FOOT EXAM  - Hemoglobin A1C (RMG)  - Microalbumin (R[facility]); Future    Morbid obesity (H)  Readdress bariatric consideration once hyperglycemia is stabilized    Primary hypertension  At goal    Major depressive disorder, recurrent episode, mild (H)  Worsened in light of DM2 and 's cancer but is managing and coping ok    Screen for colon cancer  - Adult Gastro Ref - Procedure Only; Future    Encounter for screening mammogram for malignant neoplasm of breast  - MAMMO -  Screening Digital Bilateral ([facility]UT[facility]); Future    Intertrigo  Severe, cont topicals and start " orals x 1 month  - fluconazole (DIFLUCAN) 150 MG tablet; Take 1 tablet (150 mg) by mouth once a week for 28 days    Follow up in 4 weeks for CPE, lipids, HCM

## 2021-12-14 LAB — HBA1C MFR BLD: 11.7 % (ref 4.8–5.6)

## 2021-12-14 RX ORDER — INSULIN GLARGINE 100 [IU]/ML
14 INJECTION, SOLUTION SUBCUTANEOUS DAILY
Qty: 3 ML | COMMUNITY
Start: 2021-12-13 | End: 2021-12-28

## 2021-12-16 LAB
Lab: NORMAL
URINE CULTURE: NORMAL

## 2021-12-17 DIAGNOSIS — I10 ESSENTIAL HYPERTENSION: ICD-10-CM

## 2021-12-17 RX ORDER — METOPROLOL SUCCINATE 100 MG/1
TABLET, EXTENDED RELEASE ORAL
Qty: 90 TABLET | Refills: 0 | Status: SHIPPED | OUTPATIENT
Start: 2021-12-17 | End: 2022-03-08

## 2021-12-26 ENCOUNTER — HEALTH MAINTENANCE LETTER (OUTPATIENT)
Age: 61
End: 2021-12-26

## 2021-12-27 ENCOUNTER — VIRTUAL VISIT (OUTPATIENT)
Dept: PHARMACY | Facility: PHYSICIAN GROUP | Age: 61
End: 2021-12-27
Payer: COMMERCIAL

## 2021-12-27 DIAGNOSIS — E11.9 TYPE 2 DIABETES MELLITUS WITHOUT COMPLICATION, WITHOUT LONG-TERM CURRENT USE OF INSULIN (H): Primary | ICD-10-CM

## 2021-12-27 PROCEDURE — 99606 MTMS BY PHARM EST 15 MIN: CPT | Performed by: PHARMACIST

## 2021-12-27 PROCEDURE — 99607 MTMS BY PHARM ADDL 15 MIN: CPT | Performed by: PHARMACIST

## 2021-12-27 RX ORDER — SEMAGLUTIDE 1.34 MG/ML
INJECTION, SOLUTION SUBCUTANEOUS
Qty: 1.5 ML | Status: CANCELLED | OUTPATIENT
Start: 2021-12-27 | End: 2022-02-21

## 2021-12-27 NOTE — PATIENT INSTRUCTIONS
Recommendations from today's MTM visit:                                                       1. Continue same doses of medication.     2. Ongoing diet efforts.     3. Will be starting to implement some activity over the next two weeks.     Follow-up: Return in 2 weeks (on 1/10/2022) for Phone call with MTM.  (VIDEO VISIT)      It was great to speak with you today.  I value your experience and would be very thankful for your time with providing feedback on our clinic survey. You may receive a survey via email or text message in the next few days.     To schedule another MTM appointment, please call the clinic directly or you may call the MTM scheduling line at [phone] or toll-free at [phone].     My Clinical Pharmacist's contact information:                                                      Please feel free to contact me with any questions or concerns you have.      Dorothy Wylie, Pharm.D, [facility]  Medication Therapy Management Pharmacist  [phone]

## 2021-12-27 NOTE — PROGRESS NOTES
Medication Therapy Management (MTM) Encounter    ASSESSMENT:                            Medication Adherence/Access: No issues identified    Type 2 Diabetes: Patient is not meeting A1c goal of < 7%. Patient is not meeting average glucose goal of <150mg/dl Patient is not meeting goal of >70% time in target with continuous glucose monitoring.  Both of these have significantly improved in the last week compared to 2 weeks prior. Patient would benefit from initiation of GLP1 agonist to help in weight loss efforts in addition to additional blood sugar lowering efforts, however given GI irritations on metformin and drastic lifestyle changes she has made already, would prefer to keep medication the same for now and may consider this down the line. Replaced Vanda 2 sensor over video today.     PLAN:                            1. Continue same doses of medication.     2. Ongoing diet efforts.     3. Will be starting to implement some activity over the next two weeks.     Follow-up: Return in 2 weeks (on 1/10/2022) for Phone call with MTM.    SUBJECTIVE/OBJECTIVE:                          Donna Elmore is a 61 year old female contacted via secure video for a follow-up visit. She was referred to me from .  Today's visit is a follow-up MTM visit from 12/13     Reason for visit: diabetes follow up.    Allergies/ADRs: Reviewed in chart  Past Medical History: Reviewed in chart  Tobacco: She reports that she has never smoked. She has never used smokeless tobacco.  Alcohol: not currently using    Medication Adherence/Access: no issues at this time.    Type 2 Diabetes:  Currently taking metformin ER 500mg 1 daily (tolerating this much better, diarrhea improved from higher doses) and Basaglar 14 units daily.   Patient is not experiencing side effects, but is having vision changes with blood sugar.   Blood sugar monitoring: CGM Ranges (patient reported):  See Below  Symptoms of low blood sugar? none  Symptoms of high blood  sugar? Previously polyuria, polydipsia, fatigue, yeast infections improving   Diet/Exercise: switching diet completely, met with [facility] dietician follow up on Thursday. Feeling great and food tastes good.   Lots of water, eating much more vegetables and finding healthy ways to make sustainable changes in her day to day.   WW kellen has been helping a lot.   Aspirin: not at this time  Statin: No   ACEi/ARB: No.    Has lost 20lbs in the last 2 weeks with the lifestyle changes she has been making.   Lab Results   Component Value Date    A1C 11.9 12/13/2021    A1C 11.7 12/09/2021    A1C 7.6 09/03/2021    A1C 6.8 12/14/2020    A1C 5.8 12/19/2019                   ----------------    I spent 39 minutes with this patient today. All changes were made via collaborative practice agreement with Sreedhar Parks MD. A copy of the visit note was provided to the patient's primary care provider.    The patient was given a summary of these recommendations.     Dorothy Wylie, Pharm.D, BCACP  Medication Therapy Management Pharmacist  [phone]    Telemedicine Visit Details  Type of service:  Video Conference via [email or web address]  Start Time: 3:05 PM  End Time: 3:44 PM  Originating Location (patient location): Home  Distant Location (provider location):  [facility]     Medication Therapy Recommendations  No medication therapy recommendations to display

## 2022-01-04 ENCOUNTER — MEDICAL CORRESPONDENCE (OUTPATIENT)
Dept: FAMILY MEDICINE | Facility: CLINIC | Age: 62
End: 2022-01-04

## 2022-01-10 ENCOUNTER — VIRTUAL VISIT (OUTPATIENT)
Dept: PHARMACY | Facility: PHYSICIAN GROUP | Age: 62
End: 2022-01-10
Payer: COMMERCIAL

## 2022-01-10 DIAGNOSIS — E11.9 TYPE 2 DIABETES MELLITUS WITHOUT COMPLICATION, WITHOUT LONG-TERM CURRENT USE OF INSULIN (H): Primary | ICD-10-CM

## 2022-01-10 PROCEDURE — 99607 MTMS BY PHARM ADDL 15 MIN: CPT | Performed by: PHARMACIST

## 2022-01-10 PROCEDURE — 99606 MTMS BY PHARM EST 15 MIN: CPT | Performed by: PHARMACIST

## 2022-01-10 RX ORDER — INSULIN GLARGINE 100 [IU]/ML
14 INJECTION, SOLUTION SUBCUTANEOUS DAILY
Qty: 6 ML | Refills: 1 | COMMUNITY
Start: 2022-01-10 | End: 2022-01-31

## 2022-01-10 NOTE — PROGRESS NOTES
Medication Therapy Management (MTM) Encounter    ASSESSMENT:                            Medication Adherence/Access: No issues identified    Type 2 Diabetes: Patient is not meeting A1c goal of < 7%. Patient is meeting average glucose goal of <150mg/dl Patient is meeting goal of >70% time in target with continuous glucose monitoring.  Patient would benefit from continue great lifestyle efforts, watching for low sugars.      PLAN:                            1. Congratulations on the amazing progress! Keep up the great work.     Follow-up: Return in about 4 weeks (around 2/7/2022) for Phone call with MTM.    SUBJECTIVE/OBJECTIVE:                          Donna Elmore is a 61 year old female called for a follow-up visit. She was referred to me from Dr. Parks.  Today's visit is a follow-up MTM visit from 12/27     Reason for visit: diabetes follow up.     Allergies/ADRs: Reviewed in chart  Past Medical History: Reviewed in chart  Tobacco: She reports that she has never smoked. She has never used smokeless tobacco.  Alcohol: not currently using    Medication Adherence/Access: no issues reported    Covid positive 1/10, cough, aches and sore throat, otherwise feeling okay. Fully vaccinated.     Type 2 Diabetes:  Currently taking metformin ER 500mg 1 daily (tolerating this much better, diarrhea improved from higher doses) and Basaglar 14 units daily.   Patient is not experiencing side effects, but is having vision changes with blood sugar.   Blood sugar monitoring: CGM Ranges (patient reported):  See Below  Symptoms of low blood sugar? none  Symptoms of high blood sugar? Previously polyuria, polydipsia, fatigue, yeast infections improving   Diet/Exercise: switching diet completely, met with [facility] dietician. Feeling great and food tastes good.   Lots of water, eating much more vegetables and finding healthy ways to make sustainable changes in her day to day.   [employer] kellen has been helping a lot.   Aspirin: not at  this time  Statin: No   ACEi/ARB: No.    Has lost 20lbs and continued to hold on this amount of loss over last 2 weeks.   Lab Results   Component Value Date    A1C 11.9 12/13/2021    A1C 11.7 12/09/2021    A1C 7.6 09/03/2021    A1C 6.8 12/14/2020    A1C 5.8 12/19/2019               ----------------    I spent 28 minutes with this patient today. All changes were made via collaborative practice agreement with Sreedhar Parks MD. A copy of the visit note was provided to the patient's primary care provider.    The patient was sent via [employer] a summary of these recommendations.     Dorothy Wylie, Pharm.D, [facility]  Medication Therapy Management Pharmacist  [phone]      Telemedicine Visit Details  Type of service:  Telephone visit  Start Time: 3:32 PM  End Time: 3:50 PM  Originating Location (patient location): Home  Distant Location (provider location):  [facility]     Medication Therapy Recommendations  No medication therapy recommendations to display

## 2022-01-12 NOTE — PATIENT INSTRUCTIONS
Recommendations from today's MTM visit:                                                       Congratulations on the amazing progress! Keep up the great work.     Follow-up: Return in about 4 weeks (around 2/7/2022) for Phone call with MTM.    It was great to speak with you today.  I value your experience and would be very thankful for your time with providing feedback on our clinic survey. You may receive a survey via email or text message in the next few days.     To schedule another MTM appointment, please call the clinic directly or you may call the MTM scheduling line at [phone] or toll-free at [phone].     My Clinical Pharmacist's contact information:                                                      Please feel free to contact me with any questions or concerns you have.      Dorothy Wylie, Pharm.D, [facility]  Medication Therapy Management Pharmacist  [phone]

## 2022-01-31 RX ORDER — INSULIN GLARGINE 100 [IU]/ML
INJECTION, SOLUTION SUBCUTANEOUS
Qty: 6 ML | Refills: 1 | Status: SHIPPED | OUTPATIENT
Start: 2022-01-31 | End: 2022-04-04

## 2022-01-31 NOTE — PROGRESS NOTES
Received fax from [employer] that Lantus and Basaglar not covered under insurance. Per insurance Semglee is covered.  Per Dorothy PharmKY  Ok to change patient to [place].  Prescription sent to pharmacy.

## 2022-02-01 ENCOUNTER — TELEPHONE (OUTPATIENT)
Dept: FAMILY MEDICINE | Facility: CLINIC | Age: 62
End: 2022-02-01

## 2022-02-01 ENCOUNTER — OFFICE VISIT (OUTPATIENT)
Dept: FAMILY MEDICINE | Facility: CLINIC | Age: 62
End: 2022-02-01

## 2022-02-01 VITALS
OXYGEN SATURATION: 97 % | BODY MASS INDEX: 47.09 KG/M2 | WEIGHT: 293 LBS | SYSTOLIC BLOOD PRESSURE: 128 MMHG | TEMPERATURE: 99.2 F | RESPIRATION RATE: 18 BRPM | HEIGHT: 66 IN | DIASTOLIC BLOOD PRESSURE: 92 MMHG | HEART RATE: 81 BPM

## 2022-02-01 DIAGNOSIS — L03.119 CELLULITIS OF LOWER EXTREMITY, UNSPECIFIED LATERALITY: Primary | ICD-10-CM

## 2022-02-01 DIAGNOSIS — Z12.11 SCREEN FOR COLON CANCER: ICD-10-CM

## 2022-02-01 DIAGNOSIS — E66.01 MORBID OBESITY (H): ICD-10-CM

## 2022-02-01 DIAGNOSIS — T14.8XXA WOUND INFECTION: ICD-10-CM

## 2022-02-01 DIAGNOSIS — R39.9 LOWER URINARY TRACT SYMPTOMS (LUTS): ICD-10-CM

## 2022-02-01 DIAGNOSIS — E11.65 TYPE 2 DIABETES MELLITUS WITH HYPERGLYCEMIA, WITHOUT LONG-TERM CURRENT USE OF INSULIN (H): ICD-10-CM

## 2022-02-01 DIAGNOSIS — R50.9 FEVER AND CHILLS: Primary | ICD-10-CM

## 2022-02-01 DIAGNOSIS — L08.9 WOUND INFECTION: ICD-10-CM

## 2022-02-01 DIAGNOSIS — F33.0 MAJOR DEPRESSIVE DISORDER, RECURRENT EPISODE, MILD (H): ICD-10-CM

## 2022-02-01 LAB
BACTERIA URINE: 0
BILIRUB UR QL STRIP: 0
BLOOD URINE DIP: ABNORMAL
CASTS/LPF: 0
COLOR UR: YELLOW
CRYSTAL URINE: 0
EPITHELIAL CELLS - QUEST: ABNORMAL
GLUCOSE UR STRIP-MCNC: 0 MG/DL
KETONES UR QL STRIP: 0
LEUKOCYTE ESTERASE URINE DIP: 0
MUCOUS URINE: 0
NITRITE UR QL STRIP: ABNORMAL
PH UR STRIP: 6.5 PH (ref 5–9)
PROT UR QL: 0 MG/DL (ref ?–0.01)
RBC URINE: ABNORMAL (ref 0–3)
SP GR UR STRIP: 1.01 (ref 1–1.02)
UROBILINOGEN UR QL STRIP: 0.2 EU/DL (ref 0.2–1)
WBC URINE: ABNORMAL (ref 0–3)

## 2022-02-01 PROCEDURE — 36415 COLL VENOUS BLD VENIPUNCTURE: CPT | Performed by: FAMILY MEDICINE

## 2022-02-01 PROCEDURE — 81003 URINALYSIS AUTO W/O SCOPE: CPT | Performed by: FAMILY MEDICINE

## 2022-02-01 PROCEDURE — 99215 OFFICE O/P EST HI 40 MIN: CPT | Performed by: FAMILY MEDICINE

## 2022-02-01 RX ORDER — CEFTRIAXONE SODIUM 1 G
1 VIAL (EA) INJECTION ONCE
Status: DISCONTINUED | OUTPATIENT
Start: 2022-02-01 | End: 2022-02-07

## 2022-02-01 RX ORDER — INSULIN GLARGINE-YFGN 100 [IU]/ML
INJECTION, SOLUTION SUBCUTANEOUS
COMMUNITY
Start: 2022-01-31 | End: 2022-02-07

## 2022-02-01 ASSESSMENT — PATIENT HEALTH QUESTIONNAIRE - PHQ9: SUM OF ALL RESPONSES TO PHQ QUESTIONS 1-9: 1

## 2022-02-01 ASSESSMENT — MIFFLIN-ST. JEOR: SCORE: 2287.74

## 2022-02-01 NOTE — NURSING NOTE
The following medication was given:     MEDICATION: Ceftriaxone 1GM  ROUTE: IM  SITE: LUQ - Gluteus  DOSE: 1 Gm mixed with 2.1 ml Lidocaine 1%  LOT #: [id number]  :  VHSquared  EXPIRATION DATE:  10/2022  NDC#: [address]-8  Danielle Skinner MA February 1, 2022 5:18 PM

## 2022-02-01 NOTE — PROGRESS NOTES
"  Ashok Thompson is a 61 year old patient who presents to clinic for evaluation.  Reports got cut by a dog a couple days ago and has a wound left anterior lower leg.  Some increased redness and drainage.  Reports low grade fever 99.2 and chills.  Took tylenol and chills have resolved.  No chest pain, cough, congestion, urinary symptoms, abdominal pain, headache, body aches.          Review of Systems   Constitutional, HEENT, cardiovascular, pulmonary, GI, , musculoskeletal, neuro, skin, endocrine and psych systems are negative, except as otherwise noted.      Objective    BP (!) 128/92   Pulse 81   Temp 99.2  F (37.3  C) (Oral)   Resp 18   Ht 1.676 m (5' 6\")   Wt (!) 170.1 kg (375 lb)   SpO2 97%   BMI 60.53 kg/m      General: Well appearing, NAD  Ext: there is a 2 x 2 cm abrasion/wound with whitish exudate covering it and surrounding erythema and warmth.  No streaking.  Minimal tenderness.  No crepitus.    Psych: normal mood and affect        No results found for this or any previous visit (from the past 24 hour(s)).    Fever and chills  Uncertain cause.  Leg is not overwhelming but certainly concern for soft tissue infection with systemic symptoms.  CTX 1 gram given.  Will add augmentin BID as she also says she is having a root canal soon.  Low suspicion for MRSA.  Follow up 48-72 hours, sooner if worsening.  CBC sent out.  - cefTRIAXone (ROCEPHIN) injection 1 g  - VENOUS COLLECTION  - INJECTION INTRAMUSCULAR OR SUB-Q  - Hematopath Consultation, Smear ([facility])    Wound infection  See above  - cefTRIAXone (ROCEPHIN) injection 1 g  - VENOUS COLLECTION  - INJECTION INTRAMUSCULAR OR SUB-Q    Major depressive disorder, recurrent episode, mild (H)  stable  - VENOUS COLLECTION    Morbid obesity (H)  - VENOUS COLLECTION    Type 2 diabetes mellitus with hyperglycemia, without long-term current use of insulin (H)  Cont current treatment and follow up, improving  - VENOUS COLLECTION    Screen for colon " cancer  - COLOGUARD([employer]); Future    Lower urinary tract symptoms (LUTS)  [place] UA  - Urinalysis (RMG)      40 minutes total time including chart review, exam and face to face time, and documentation.

## 2022-02-02 NOTE — TELEPHONE ENCOUNTER
Patient calling about Abxc script. As per Dr. Parks's note, Augmentin sent to patients pharmacy of choice.

## 2022-02-04 ENCOUNTER — OFFICE VISIT (OUTPATIENT)
Dept: FAMILY MEDICINE | Facility: CLINIC | Age: 62
End: 2022-02-04

## 2022-02-04 VITALS
DIASTOLIC BLOOD PRESSURE: 86 MMHG | RESPIRATION RATE: 18 BRPM | BODY MASS INDEX: 47.09 KG/M2 | WEIGHT: 293 LBS | HEART RATE: 65 BPM | OXYGEN SATURATION: 98 % | HEIGHT: 66 IN | SYSTOLIC BLOOD PRESSURE: 126 MMHG

## 2022-02-04 DIAGNOSIS — S81.802D OPEN WOUND OF LEFT LOWER EXTREMITY, SUBSEQUENT ENCOUNTER: ICD-10-CM

## 2022-02-04 DIAGNOSIS — E11.9 TYPE 2 DIABETES MELLITUS WITHOUT COMPLICATION, WITHOUT LONG-TERM CURRENT USE OF INSULIN (H): ICD-10-CM

## 2022-02-04 DIAGNOSIS — L03.116 CELLULITIS OF LEFT LOWER EXTREMITY: Primary | ICD-10-CM

## 2022-02-04 PROCEDURE — 99213 OFFICE O/P EST LOW 20 MIN: CPT | Performed by: FAMILY MEDICINE

## 2022-02-04 ASSESSMENT — MIFFLIN-ST. JEOR: SCORE: 2287.74

## 2022-02-04 NOTE — PROGRESS NOTES
This encounter was created solely for the purpose of releasing the future order that was placed for Cologuard.  This is a necessary step in order for the results to be abstracted once they are available.  Francisco Seals

## 2022-02-04 NOTE — PROGRESS NOTES
"  Ashok Thompson is a 61 year old patient who presents to clinic for follow up.  She was seen 3 days ago with chills, lower extremity wound and presumed cellulitis.  She was given ceftriaxone and started on augmentin.  She feels significantly improved.  Wound and surrounding skin is improving.  No fever, chills or other systemic symptoms.        Review of Systems   Constitutional, HEENT, cardiovascular, pulmonary, GI, , musculoskeletal, neuro, skin, endocrine and psych systems are negative, except as otherwise noted.      Objective    /86   Pulse 65   Resp 18   Ht 1.676 m (5' 6\")   Wt (!) 170.1 kg (375 lb)   SpO2 98%   BMI 60.53 kg/m      General: Well appearing, NAD  Skin: 2 x 1 cm superficial wound LLE with clear drainage.  Minimal surrounding erythema and induration  Psych: normal mood and affect        Cellulitis of left lower extremity  Improving, complete abx, follow up if not improved or worse    Open wound of left lower extremity, subsequent encounter  Cont wound dressing changes daily until resolved.  Given DM2 if not improving will refer to wound clinic    Type 2 diabetes mellitus without complication, without long-term current use of insulin (H)  Cont current treaments            "

## 2022-02-05 LAB
BASOPHILS # BLD AUTO: 0.1 X10E3/UL (ref 0–0.2)
BASOPHILS NFR BLD AUTO: 1 %
EOSINOPHIL # BLD AUTO: 0 X10E3/UL (ref 0–0.4)
EOSINOPHIL NFR BLD AUTO: 0 %
ERYTHROCYTE [DISTWIDTH] IN BLOOD BY AUTOMATED COUNT: 13.9 % (ref 11.7–15.4)
HCT VFR BLD AUTO: 43.9 % (ref 34–46.6)
HEMOGLOBIN: 14.1 G/DL (ref 11.1–15.9)
IMMATURE GRANS (ABS): 0.1 X10E3/UL (ref 0–0.1)
IMMATURE GRANULOCYTES: 1 %
LYMPHOCYTES # BLD AUTO: 1.1 X10E3/UL (ref 0.7–3.1)
LYMPHOCYTES NFR BLD AUTO: 14 %
MCH RBC QN AUTO: 26.3 PG (ref 26.6–33)
MCHC RBC AUTO-ENTMCNC: 32.1 G/DL (ref 31.5–35.7)
MCV RBC AUTO: 82 FL (ref 79–97)
MONOCYTES # BLD AUTO: 0.7 X10E3/UL (ref 0.1–0.9)
MONOCYTES NFR BLD AUTO: 9 %
NEUTROPHILS # BLD AUTO: 6.1 X10E3/UL (ref 1.4–7)
NEUTROPHILS NFR BLD AUTO: 75 %
PATHOLOGIST: ABNORMAL
PLATELETS: 301 X10E3/UL (ref 150–450)
PLTS: ABNORMAL
RBC # BLD AUTO: 5.36 X10E6/UL (ref 3.77–5.28)
RBC CHRM BLD SMEAR: ABNORMAL
WBC # BLD AUTO: 8.1 X10E3/UL (ref 3.4–10.8)
WBC # BLD AUTO: ABNORMAL 10*3/UL

## 2022-02-07 ENCOUNTER — VIRTUAL VISIT (OUTPATIENT)
Dept: PHARMACY | Facility: PHYSICIAN GROUP | Age: 62
End: 2022-02-07
Payer: COMMERCIAL

## 2022-02-07 DIAGNOSIS — B37.31 YEAST INFECTION OF THE VAGINA: ICD-10-CM

## 2022-02-07 DIAGNOSIS — E66.01 MORBID OBESITY (H): ICD-10-CM

## 2022-02-07 DIAGNOSIS — E11.9 TYPE 2 DIABETES MELLITUS WITHOUT COMPLICATION, WITHOUT LONG-TERM CURRENT USE OF INSULIN (H): Primary | ICD-10-CM

## 2022-02-07 PROCEDURE — 99606 MTMS BY PHARM EST 15 MIN: CPT | Performed by: PHARMACIST

## 2022-02-07 RX ORDER — FLUCONAZOLE 150 MG/1
150 TABLET ORAL ONCE
Qty: 1 TABLET | Refills: 0 | Status: SHIPPED | OUTPATIENT
Start: 2022-02-07 | End: 2022-02-21

## 2022-02-07 NOTE — PROGRESS NOTES
Medication Therapy Management (MTM) Encounter    ASSESSMENT:                            Medication Adherence/Access: No issues identified    Type 2 Diabetes/Obesity: Patient is not meeting A1c goal of < 7%. Patient is meeting average glucose goal of <150mg/dl Patient is meeting goal of >70% time in target with continuous glucose monitoring.  Patient would benefit from no changes at this time, but discussed yeast infection with PCP and order for 1x fluconazole ordered.    PLAN:                            1. Fluconazole 150mg x1 dose     Follow-up: Return in about 6 weeks (around 3/21/2022) for Primary Care Provider, MTM visit in clinic, Lab Work.    SUBJECTIVE/OBJECTIVE:                          Donna Elmore is a 61 year old female called for a follow-up visit.  Today's visit is a follow-up MTM visit from 1/10.      Reason for visit: diabetes follow up call.    Allergies/ADRs: Reviewed in chart  Past Medical History: Reviewed in chart  Tobacco: She reports that she has never smoked. She has never used smokeless tobacco.  Alcohol: not currently using    Medication Adherence/Access: no issues reported    Type 2 Diabetes/Obesity:  Currently taking metformin ER 500mg 1 daily (tolerating this much better, diarrhea improved from higher doses) and Basaglar/Semglee 14 units daily.   Patient is not experiencing side effects, but is having vision changes with blood sugar.   Blood sugar monitoring: CGM Ranges (patient reported): See Below  Symptoms of low blood sugar? none  Symptoms of high blood sugar? Previously polyuria, polydipsia, fatigue, yeast infections improving (did just have antibiotics for leg issue, so now is starting to get another yeast infection).   Diet/Exercise: switching diet completely, met with [facility] dietician. Feeling great and food tastes good.   Lots of water, eating much more vegetables and finding healthy ways to make sustainable changes in her day to day.   [employer] kellen has been helping a  lot. Connecting in different support circles in the [employer] apps.   Aspirin: not at this time  Statin: No   ACEi/ARB: No.    Originally lost 20lbs but stopped seeing this dropping.   Meeting with nutritionist next week again too.   Lab Results   Component Value Date    A1C 11.9 12/13/2021    A1C 11.7 12/09/2021    A1C 7.6 09/03/2021    A1C 6.8 12/14/2020    A1C 5.8 12/19/2019         ----------------    I spent 14 minutes with this patient today. All changes were made via collaborative practice agreement with Sreedhar Parks MD. A copy of the visit note was provided to the patient's provider(s).    The patient was sent via [employer] a summary of these recommendations.     Dorothy Wylie, Pharm.D, [facility]  Medication Therapy Management Pharmacist  [phone]      Telemedicine Visit Details  Type of service:  Telephone visit  Start Time: 3:49 PM  End Time: 4:03 PM  Originating Location (patient location): Home  Distant Location (provider location):  [facility]     Medication Therapy Recommendations  Yeast infection of the vagina    Current Medication: fluconazole (DIFLUCAN) 150 MG tablet   Rationale: Untreated condition - Needs additional medication therapy - Indication   Recommendation: Start Medication - 1 dose   Status: Accepted per CPA

## 2022-02-07 NOTE — PATIENT INSTRUCTIONS
Recommendations from today's MTM visit:                                                       1. Fluconazole 150mg x 1 dose sent to pharmacy for yeast infection.    Follow-up: Return in about 6 weeks (around 3/21/2022) for Primary Care Provider, MTM visit in clinic, Lab Work.- Diabetes labs    It was great to speak with you today.  I value your experience and would be very thankful for your time with providing feedback on our clinic survey. You may receive a survey via email or text message in the next few days.     To schedule another MTM appointment, please call the clinic directly or you may call the MTM scheduling line at [phone] or toll-free at [phone].     My Clinical Pharmacist's contact information:                                                      Please feel free to contact me with any questions or concerns you have.      Dorothy Wylie, Pharm.D, [facility]  Medication Therapy Management Pharmacist  [phone]

## 2022-02-21 ENCOUNTER — MYC REFILL (OUTPATIENT)
Dept: PHARMACY | Facility: PHYSICIAN GROUP | Age: 62
End: 2022-02-21
Payer: COMMERCIAL

## 2022-02-21 DIAGNOSIS — B37.31 YEAST INFECTION OF THE VAGINA: ICD-10-CM

## 2022-02-21 RX ORDER — FLUCONAZOLE 150 MG/1
150 TABLET ORAL ONCE
Qty: 1 TABLET | Refills: 0 | Status: SHIPPED | OUTPATIENT
Start: 2022-02-21 | End: 2022-02-21

## 2022-03-08 DIAGNOSIS — I10 ESSENTIAL HYPERTENSION: ICD-10-CM

## 2022-03-08 RX ORDER — METOPROLOL SUCCINATE 100 MG/1
100 TABLET, EXTENDED RELEASE ORAL DAILY
Qty: 90 TABLET | Refills: 1 | Status: SHIPPED | OUTPATIENT
Start: 2022-03-08 | End: 2022-09-01

## 2022-03-08 NOTE — TELEPHONE ENCOUNTER
Metoprolol   LOV 12/13/21  Primary hypertension  At goal  Follow up in 4 weeks for CPE, lipids, HCM  BP Readings from Last 3 Encounters:   02/04/22 126/86   02/01/22 (!) 128/92   12/13/21 118/72     Component      Latest Ref Rng & Units 12/9/2021   Glucose      65 - 99 mg/dL 421 (H)   Urea Nitrogen      8 - 27 mg/dL 16   Creatinine      0.57 - 1.00 mg/dL 0.98   eGFR If NonAfricn Am      >59 mL/min/1.73 63   eGFR If Africn Am      >59 mL/min/1.73 73   BUN/Creatinine Ratio      12 - 28 16   Sodium      134 - 144 mmol/L 134   Potassium      3.5 - 5.2 mmol/L 4.8   Chloride      96 - 106 mmol/L 98   Total CO2      20 - 29 mmol/L 19 (L)   Calcium      8.7 - 10.3 mg/dL 9.2   Protein Total      6.0 - 8.5 g/dL 6.5   Albumin      3.8 - 4.9 g/dL 4.3   Globulin, Total      1.5 - 4.5 g/dL 2.2   A/G Ratio      1.2 - 2.2 2.0   Bilirubin Total      0.0 - 1.2 mg/dL 0.5   Alkaline Phosphatase      44 - 121 IU/L 107   AST      0 - 40 IU/L 24   ALT      0 - 32 IU/L 31

## 2022-03-29 ENCOUNTER — VIRTUAL VISIT (OUTPATIENT)
Dept: PHARMACY | Facility: PHYSICIAN GROUP | Age: 62
End: 2022-03-29
Payer: COMMERCIAL

## 2022-03-29 DIAGNOSIS — E11.9 TYPE 2 DIABETES MELLITUS WITHOUT COMPLICATION, WITHOUT LONG-TERM CURRENT USE OF INSULIN (H): Primary | ICD-10-CM

## 2022-03-29 DIAGNOSIS — E66.01 MORBID OBESITY (H): ICD-10-CM

## 2022-03-29 PROCEDURE — 99606 MTMS BY PHARM EST 15 MIN: CPT | Performed by: PHARMACIST

## 2022-03-29 NOTE — PATIENT INSTRUCTIONS
Recommendations from today's MTM visit:                                                       1. Keep up the great work!    2. Due to see Dr. Parks for updated labs.    3. Let me know if you want to switch over from the insulin to Ozempic weekly.       Follow-up: Return in 6 weeks (on 5/10/2022) for Phone call with MTM.    It was great to speak with you today.  I value your experience and would be very thankful for your time with providing feedback on our clinic survey. You may receive a survey via email or text message in the next few days.       My Clinical Pharmacist's contact information:                                                      Please feel free to contact me with any questions or concerns you have.      Dorothy Wylie, Pharm.D, [facility]  Medication Therapy Management Pharmacist  [phone]

## 2022-03-29 NOTE — PROGRESS NOTES
Medication Therapy Management (MTM) Encounter    ASSESSMENT:                            Medication Adherence/Access: No issues identified    Type 2 Diabetes/Obesity: Reviewed options around GLP1 agonist to replace insulin and help to further weight loss efforts. At this time she would like to continue current regimen and program with new work routine then consider this switch.    PLAN:                            1. Keep up the great work!    2. Due to see Dr. Parks for updated labs.    3. Let me know if you want to switch over from the insulin to Ozempic weekly.     Follow-up: Return in 6 weeks (on 5/10/2022) for Phone call with MTM.    SUBJECTIVE/OBJECTIVE:                          Donna Elmore is a 62 year old female called for a follow-up visit.  Today's visit is a follow-up MTM visit from 2/8     Reason for visit: Medication follow up.    Allergies/ADRs: Reviewed in chart  Past Medical History: Reviewed in chart  Tobacco: She reports that she has never smoked. She has never used smokeless tobacco.  Alcohol: not currently using    Medication Adherence/Access: no issues reported    Type 2 Diabetes/Obesity:  Currently taking metformin ER 500mg 1 daily (tolerating this much better, diarrhea improved from higher doses) and Basaglar/Semglee 14 units daily.   Patient is not experiencing side effects. Feeling great.   Blood sugar monitoring: CGM Ranges (patient reported): See Below  Symptoms of low blood sugar? none  Symptoms of high blood sugar? Previously polyuria, polydipsia, fatigue, yeast infections improving    Diet/Exercise: switching diet completely, met with [facility] dietician. Feeling great and food tastes good.   Lots of water, eating much more vegetables and finding healthy ways to make sustainable changes in her day to day.   [employer] kellen has been helping a lot. Connecting in different support circles in the [employer] apps. Also now using Noom and feels this is a great addition.   Aspirin: not at this  time  Statin: No   ACEi/ARB: No.    Down 9 more pounds since our last vist.   May be getting a job out of the house coming up, so would like to avoid medication changes at this time but may be open to options at follow up.  Wt Readings from Last 4 Encounters:   02/04/22 (!) 375 lb (170.1 kg)   02/01/22 (!) 375 lb (170.1 kg)   12/13/21 (!) 384 lb 3.2 oz (174.3 kg)   12/09/21 (!) 387 lb 2 oz (175.6 kg)       Lab Results   Component Value Date    A1C 11.9 12/13/2021    A1C 11.7 12/09/2021    A1C 7.6 09/03/2021    A1C 6.8 12/14/2020    A1C 5.8 12/19/2019         ----------------      I spent 10 minutes with this patient today. All changes were made via collaborative practice agreement with Sreedhar Parks MD. A copy of the visit note was provided to the patient's provider(s).    The patient was sent via [employer] a summary of these recommendations.     Dorothy Wylie, Pharm.D, [facility]  Medication Therapy Management Pharmacist  [phone]      Telemedicine Visit Details  Type of service:  Telephone visit  Start Time: 1:55 PM  End Time: 2:05 PM  Originating Location (patient location): [place]  Distant Location (provider location):  [facility]     Medication Therapy Recommendations  Type 2 diabetes mellitus without complication, without long-term current use of insulin (H)    Current Medication: insulin glargine (SEMGLEE) 100 UNIT/ML pen   Rationale: More effective medication available - Ineffective medication - Effectiveness   Recommendation: Change Medication - Ozempic (0.25 or 0.5 MG/DOSE) 2 MG/1.5ML Sopn - switch to GLP1 agonist   Status: Declined per Patient

## 2022-05-10 ENCOUNTER — VIRTUAL VISIT (OUTPATIENT)
Dept: PHARMACY | Facility: PHYSICIAN GROUP | Age: 62
End: 2022-05-10
Payer: COMMERCIAL

## 2022-05-10 DIAGNOSIS — E66.01 MORBID OBESITY (H): ICD-10-CM

## 2022-05-10 DIAGNOSIS — J30.1 SEASONAL ALLERGIC RHINITIS DUE TO POLLEN: ICD-10-CM

## 2022-05-10 DIAGNOSIS — E11.9 TYPE 2 DIABETES MELLITUS WITHOUT COMPLICATION, WITHOUT LONG-TERM CURRENT USE OF INSULIN (H): Primary | ICD-10-CM

## 2022-05-10 PROCEDURE — 99606 MTMS BY PHARM EST 15 MIN: CPT | Performed by: PHARMACIST

## 2022-05-10 RX ORDER — INSULIN GLARGINE-YFGN 100 [IU]/ML
12 INJECTION, SOLUTION SUBCUTANEOUS DAILY
COMMUNITY
Start: 2022-04-19 | End: 2022-05-10

## 2022-05-10 NOTE — PROGRESS NOTES
Medication Therapy Management (MTM) Encounter    ASSESSMENT:                            Medication Adherence/Access: No issues identified    Type 2 Diabetes/Obesity:  A1c due to be rechecked. CGM results slightly higher than prior with her dietary changes due to dental issues. Still meeting goal of <150mg/dl average glucose and time in range >70%. Today we again discussed options around GLP1 agonist to replace insulin and help to further weight loss efforts, however she prefers to continue current regimen and program with new work routine then consider this switch.     Allergic rhinitis: Patient would benefit from starting additional therapy - loratadine 10mg once daily and fluticasone nasal spray 2 spray(s) once daily. Discussed avoiding decongestants due to impact on blood pressure and activation, also discussed avoiding anticholinergic antihistamines to limit drowsiness.     PLAN:                            1. Set up follow up with Dr. Parks for updated diabetes labs.     2. Try Loratadine (Claritin) 10mg once daily for allergies. You can also try fluticasone (Flonase) nasal spray 1-2 sprays each nostril daily to help with congestion from allergies.     Follow-up: No follow-ups on file.    SUBJECTIVE/OBJECTIVE:                          Donna Elmore is a 62 year old female called for a follow-up visit.  Today's visit is a follow-up MTM visit from 3/29   Reason for visit: diabetes follow up.    Allergies/ADRs: Reviewed in chart  Past Medical History: Reviewed in chart  Tobacco: She reports that she has never smoked. She has never used smokeless tobacco.  Alcohol: not currently using    Medication Adherence/Access: no issues reported    Type 2 Diabetes/Obesity:  Currently taking metformin ER 500mg 1 daily (tolerating this much better, diarrhea improved from higher doses) and Semglee 12 units daily.   Patient is not experiencing side effects. Feeling great.   Blood sugar monitoring: CGM Ranges (patient reported):  See Below  Symptoms of low blood sugar? none  Symptoms of high blood sugar? Previously polyuria, polydipsia, fatigue, yeast infections improving    Diet/Exercise: switching diet completely, met with [facility] dietician. Feeling great and food tastes good.   Lots of water, eating much more vegetables and finding healthy ways to make sustainable changes in her day to day.   [employer] kellen has been helping a lot. Connecting in different support circles in the [employer] apps. Also now using Noom and feels this is a great addition.   Aspirin: not at this time  Statin: No   ACEi/ARB: No.    Gums sore, so food has not been as much vegetables- still focused on keeping weight and sugars down.   Still working on getting job set up, so this is on/off.   366lbs about at home     Wt Readings from Last 4 Encounters:   02/04/22 (!) 375 lb (170.1 kg)   02/01/22 (!) 375 lb (170.1 kg)   12/13/21 (!) 384 lb 3.2 oz (174.3 kg)   12/09/21 (!) 387 lb 2 oz (175.6 kg)     Lab Results   Component Value Date    A1C 11.9 12/13/2021    A1C 11.7 12/09/2021    A1C 7.6 09/03/2021    A1C 6.8 12/14/2020    A1C 5.8 12/19/2019           Allergic Rhinitis: Current medications include saline nasal spray 4 times daily. Primary symptoms are nasal congestion and runny nose. Patient feels that current therapy is not effective.     ----------------    I spent 12 minutes with this patient today. All changes were made via collaborative practice agreement with Sreedhar Parks MD. A copy of the visit note was provided to the patient's provider(s).    The patient was sent via [employer] a summary of these recommendations.     Dorothy Wylie, Pharm.D, [facility]  Medication Therapy Management Pharmacist  [phone]    Telemedicine Visit Details  Type of service:  Telephone visit  Start Time: 1:03 PM  End Time: 1:15 PM  Originating Location (patient location): Home  Distant Location (provider location):  [facility]     Medication Therapy  Recommendations  No medication therapy recommendations to display

## 2022-05-10 NOTE — PATIENT INSTRUCTIONS
"Recommendations from today's MTM visit:                                                       1. Set up follow up with Dr. Parks for updated diabetes labs.     2. Try Loratadine (Claritin) 10mg once daily for allergies. You can also try fluticasone (Flonase) nasal spray 1-2 sprays each nostril daily to help with congestion from allergies.     Follow-up: Return in 6 weeks (on 6/21/2022) for Phone call with MTM.    It was great speaking with you today.  I value your experience and would be very thankful for your time in providing feedback in our clinic survey. In the next few days, you may receive an email or text message from [facility] [employer] with a link to a survey related to your  clinical pharmacist.\"     My Clinical Pharmacist's contact information:                                                      Please feel free to contact me with any questions or concerns you have.      Dorothy Wylie, Pharm.D, [facility]  Medication Therapy Management Pharmacist  [phone]     "

## 2022-06-21 ENCOUNTER — VIRTUAL VISIT (OUTPATIENT)
Dept: PHARMACY | Facility: PHYSICIAN GROUP | Age: 62
End: 2022-06-21
Payer: COMMERCIAL

## 2022-06-21 DIAGNOSIS — E11.9 TYPE 2 DIABETES MELLITUS WITHOUT COMPLICATION, WITHOUT LONG-TERM CURRENT USE OF INSULIN (H): Primary | ICD-10-CM

## 2022-06-21 DIAGNOSIS — E66.01 MORBID OBESITY (H): ICD-10-CM

## 2022-06-21 PROCEDURE — 99607 MTMS BY PHARM ADDL 15 MIN: CPT | Performed by: PHARMACIST

## 2022-06-21 PROCEDURE — 99606 MTMS BY PHARM EST 15 MIN: CPT | Performed by: PHARMACIST

## 2022-06-21 RX ORDER — SEMAGLUTIDE 1.34 MG/ML
INJECTION, SOLUTION SUBCUTANEOUS
Qty: 1.5 ML | Refills: 1 | Status: SHIPPED | OUTPATIENT
Start: 2022-06-21 | End: 2022-06-21

## 2022-06-21 RX ORDER — SEMAGLUTIDE 1.34 MG/ML
INJECTION, SOLUTION SUBCUTANEOUS
Qty: 1.5 ML | Refills: 1 | Status: SHIPPED | OUTPATIENT
Start: 2022-06-21 | End: 2022-08-05

## 2022-06-21 NOTE — PATIENT INSTRUCTIONS
"Recommendations from today's MTM visit:                                                       1.Start Ozempic 0.25mg weekly for 4 weeks then increase to Ozempic 0.5mg weekly (first pen will have 6 weeks of medication)  - Stop Semglee if you start the Ozempic.     2. You can find savings card online or text BEGIN to 66722 for savings card to help copay if needed for Ozempic.       3. Set up follow up with Dr. Parks.     Follow-up: Return in about 1 week (around 6/28/2022) for Primary Care Provider. And MTM in 1 month (July 26th at 1:30pm phone call)     It was great speaking with you today.  I value your experience and would be very thankful for your time in providing feedback in our clinic survey. In the next few days, you may receive an email or text message from [employer] with a link to a survey related to your  clinical pharmacist.\"       My Clinical Pharmacist's contact information:                                                      Please feel free to contact me with any questions or concerns you have.      Dorothy Wylie, Pharm.D, [facility]  Medication Therapy Management Pharmacist  [phone]     "

## 2022-06-21 NOTE — PROGRESS NOTES
Medication Therapy Management (MTM) Encounter    ASSESSMENT:                            Medication Adherence/Access: No issues identified    Type 2 Diabetes/Obesity: recommend switching therapy to GLP1 agonist to assist in weight loss efforts and keep sugars down. Would like to check coverage and still think about making the switch.  Due to see Dr. Parks in office for follow up and labs, she will call to schedule.     PLAN:                            1.Start Ozempic 0.25mg weekly for 4 weeks then increase to Ozempic 0.5mg weekly (first pen will have 6 weeks of medication)  - Stop Semglee if you start the Ozempic.     2. You can find savings card online or text BEGIN to 46226 for savings card to help copay if needed for Ozempic.       3. Set up follow up with Dr. Parks.     Follow-up: Return in about 1 week (around 6/28/2022) for Primary Care Provider. MTM in 1 month    SUBJECTIVE/OBJECTIVE:                          Donna Elmore is a 62 year old female called for a follow-up visit.  Today's visit is a follow-up MTM visit from 5/10     Reason for visit: diabetes follow up .    Allergies/ADRs: Reviewed in chart  Past Medical History: Reviewed in chart  Tobacco: She reports that she has never smoked. She has never used smokeless tobacco.  Alcohol: not currently using  Social: still waiting on job   Dental issues- impacting diet at this time.   Niece in hospital    Medication Adherence/Access: no issues reported    Type 2 Diabetes/Obesity:  Currently taking metformin ER 500mg 1 daily (tolerating this much better, diarrhea improved from higher doses) and Semglee 12 units daily. Previously discussed switching from insulin to GLP1 agonist for sugar control, but also aid in weight loss efforts- previously preferred to hold off on a switch. Sugars have been slowly increasing back up. Weight has been stuck at same weight.  Patient is not experiencing side effects. Feeling great.   Blood sugar monitoring: CGM Ranges  (patient reported): See Below  Symptoms of low blood sugar? none  Symptoms of high blood sugar? Previously polydipsia, fatigue, yeast infections improving, still having polyuria.   Diet/Exercise: switching diet completely, met with [facility] dietician. Feeling great and food tastes good.   Lots of water, eating much more vegetables and finding healthy ways to make sustainable changes in her day to day.   [employer] kellen has been helping a lot. Connecting in different support circles in the [employer] apps. Also now using Noom and feels this is a great addition.   Aspirin: not at this time  Statin: No   ACEi/ARB: No.    Gums sore, so food has not been as much vegetables- still focused on keeping weight and sugars down.   Still working on getting job set up, so this is on/off.   366lbs about at home, feeling like holding on more fluid as well.     Wt Readings from Last 4 Encounters:   02/04/22 (!) 375 lb (170.1 kg)   02/01/22 (!) 375 lb (170.1 kg)   12/13/21 (!) 384 lb 3.2 oz (174.3 kg)   12/09/21 (!) 387 lb 2 oz (175.6 kg)     Lab Results   Component Value Date    A1C 11.9 12/13/2021    A1C 11.7 12/09/2021    A1C 7.6 09/03/2021    A1C 6.8 12/14/2020    A1C 5.8 12/19/2019           ----------------    I spent 18 minutes with this patient today. All changes were made via collaborative practice agreement with Sreedhar Parks MD. A copy of the visit note was provided to the patient's provider(s).    The patient was given a summary of these recommendations.     Dorothy Wylie, Pharm.D, BCACP  Medication Therapy Management Pharmacist  [phone]      Telemedicine Visit Details  Type of service:  Telephone visit  Start Time: 1:00 PM  End Time: 1:18 PM  Originating Location (patient location): Home  Distant Location (provider location):  [facility]     Medication Therapy Recommendations  Type 2 diabetes mellitus without complication, without long-term current use of insulin (H)    Current Medication: insulin  glargine (SEMGLEE) 100 UNIT/ML pen (Discontinued)   Rationale: More effective medication available - Ineffective medication - Effectiveness   Recommendation: Change Medication - Ozempic (0.25 or 0.5 MG/DOSE) 2 MG/1.5ML Sopn - start 0.25mg weekly   Status: Accepted per CPA

## 2022-08-05 DIAGNOSIS — E11.9 TYPE 2 DIABETES MELLITUS WITHOUT COMPLICATION, WITHOUT LONG-TERM CURRENT USE OF INSULIN (H): ICD-10-CM

## 2022-08-05 DIAGNOSIS — E66.01 MORBID OBESITY (H): ICD-10-CM

## 2022-08-05 RX ORDER — SEMAGLUTIDE 1.34 MG/ML
INJECTION, SOLUTION SUBCUTANEOUS
Qty: 1.5 ML | Refills: 1 | Status: SHIPPED | OUTPATIENT
Start: 2022-08-05 | End: 2022-09-12

## 2022-08-05 NOTE — TELEPHONE ENCOUNTER
Ozempic. LOV 2/04/22.    Type 2 diabetes mellitus without complication, without long-term current use of insulin (H)  Cont current treaments    Hemoglobin A1C   Date Value Ref Range Status   12/13/2021 11.9 (A) 4.0 - 6.0 % Final

## 2022-08-07 ENCOUNTER — HEALTH MAINTENANCE LETTER (OUTPATIENT)
Age: 62
End: 2022-08-07

## 2022-08-15 ENCOUNTER — VIRTUAL VISIT (OUTPATIENT)
Dept: PHARMACY | Facility: PHYSICIAN GROUP | Age: 62
End: 2022-08-15
Payer: COMMERCIAL

## 2022-08-15 DIAGNOSIS — E66.01 MORBID OBESITY (H): ICD-10-CM

## 2022-08-15 DIAGNOSIS — E11.9 TYPE 2 DIABETES MELLITUS WITHOUT COMPLICATION, WITHOUT LONG-TERM CURRENT USE OF INSULIN (H): Primary | ICD-10-CM

## 2022-08-15 PROCEDURE — 99606 MTMS BY PHARM EST 15 MIN: CPT | Performed by: PHARMACIST

## 2022-08-15 NOTE — PROGRESS NOTES
Medication Therapy Management (MTM) Encounter    ASSESSMENT:                            Medication Adherence/Access: No issues identified    Type 2 Diabetes/obesity: Patient is not meeting A1c goal of < 7%. Patient is meeting average glucose goal of <150mg/dL. Patient is meeting goal of >70% time in target with continuous glucose monitoring.  Patient would benefit from updated a1c check with PCP. Continue to focus on lifestyle efforts for weight loss. After 4 more weeks at Ozempic 0.5mg could consider dose increase to 1mg for further weight benefits.       PLAN:                            1. No changes at this time, but would encourage setting up office visit with Dr. Parks to update labs.     Follow-up: Return in 4 weeks (on 9/12/2022) for Phone call with MTM.    SUBJECTIVE/OBJECTIVE:                          Donna Elmore is a 62 year old female called for a follow-up visit.  Today's visit is a follow-up MTM visit from 6/21   Reason for visit: diabetes follow up.    Allergies/ADRs: Reviewed in chart  Past Medical History: Reviewed in chart  Tobacco: She reports that she has never smoked. She has never used smokeless tobacco.  Alcohol: not at this time.     Medication Adherence/Access: no issues reported    Type 2 Diabetes/Obesity:  Currently taking metformin ER 500mg 1 daily (tolerating this much better, diarrhea improved from higher doses) and Ozempic 0.5mg (2 doses so far- replaced Semglee 12 units daily).     Patient is not experiencing side effects. Feeling great.   Blood sugar monitoring: CGM Ranges (patient reported): See Below  Symptoms of low blood sugar? none  Symptoms of high blood sugar? Previously polydipsia, fatigue, yeast infections improving, still having polyuria.   Diet/Exercise: switching diet completely, met with [facility] dietician. Feeling great and food tastes good.   Lots of water, eating much more vegetables and finding healthy ways to make sustainable changes in her day to day.    [employer] kellen has been helping a lot. Connecting in different support circles in the [employer] apps. Also now using [employer] and feels this is a great addition.   Aspirin: not at this time  Statin: No   ACEi/ARB: No.    Stressful summer, so weight has been going back up vs down.   New therapist this last 3 weeks.     Lab Results   Component Value Date    A1C 11.9 12/13/2021    A1C 11.7 12/09/2021    A1C 7.6 09/03/2021    A1C 6.8 12/14/2020    A1C 5.8 12/19/2019         ----------------      I spent 10 minutes with this patient today. All changes were made via collaborative practice agreement with Sreedhar Parks MD. A copy of the visit note was provided to the patient's provider(s).    The patient was given a summary of these recommendations.     Dorothy Wylie, Pharm.D, [facility]  Medication Therapy Management Pharmacist  [phone]      Telemedicine Visit Details  Type of service:  Telephone visit  Start Time: 1:35 PM  End Time: 1:45 PM  Originating Location (patient location): Home  Distant Location (provider location):  [facility]     Medication Therapy Recommendations  No medication therapy recommendations to display

## 2022-08-15 NOTE — PATIENT INSTRUCTIONS
"Recommendations from today's MTM visit:                                                         1. No changes at this time, but due for office visit with Dr. Parks to update labs.     Follow-up: Return in 4 weeks (on 9/12/2022) for Phone call with MTM.    It was great speaking with you today.  I value your experience and would be very thankful for your time in providing feedback in our clinic survey. In the next few days, you may receive an email or text message from [employer] with a link to a survey related to your  clinical pharmacist.\"       My Clinical Pharmacist's contact information:                                                      Please feel free to contact me with any questions or concerns you have.      Dorothy Wylie, Pharm.D, [facility]  Medication Therapy Management Pharmacist  [phone]     "

## 2022-08-31 DIAGNOSIS — I10 ESSENTIAL HYPERTENSION: ICD-10-CM

## 2022-09-01 NOTE — TELEPHONE ENCOUNTER
Metoprolol. LOV 12/13/21. Upcoming appt 9/12/22      Primary hypertension  At goal  Follow up in 4 weeks for CPE, lipids, HCM      BP Readings from Last 3 Encounters:   02/04/22 126/86   02/01/22 (!) 128/92   12/13/21 118/72

## 2022-09-02 RX ORDER — METOPROLOL SUCCINATE 100 MG/1
TABLET, EXTENDED RELEASE ORAL
Qty: 90 TABLET | Refills: 3 | Status: SHIPPED | OUTPATIENT
Start: 2022-09-02 | End: 2023-05-03

## 2022-09-12 ENCOUNTER — VIRTUAL VISIT (OUTPATIENT)
Dept: PHARMACY | Facility: PHYSICIAN GROUP | Age: 62
End: 2022-09-12
Payer: COMMERCIAL

## 2022-09-12 DIAGNOSIS — E66.01 MORBID OBESITY (H): ICD-10-CM

## 2022-09-12 DIAGNOSIS — E11.9 TYPE 2 DIABETES MELLITUS WITHOUT COMPLICATION, WITHOUT LONG-TERM CURRENT USE OF INSULIN (H): Primary | ICD-10-CM

## 2022-09-12 PROCEDURE — 99606 MTMS BY PHARM EST 15 MIN: CPT | Performed by: PHARMACIST

## 2022-09-12 RX ORDER — SEMAGLUTIDE 1.34 MG/ML
0.5 INJECTION, SOLUTION SUBCUTANEOUS WEEKLY
Qty: 1.5 ML | Refills: 0 | Status: SHIPPED | OUTPATIENT
Start: 2022-09-12 | End: 2022-10-10

## 2022-09-12 NOTE — PROGRESS NOTES
Medication Therapy Management (MTM) Encounter    ASSESSMENT:                            Medication Adherence/Access: No issues identified    Type 2 Diabetes/Obesity: Patient is not meeting A1c goal of < 7%, but has not been rechecked this year since making significant progress on diet/lifestyle and weight.  Patient is meeting average glucose goal of <150mg/dL. Patient is meeting goal of >70% time in target with continuous glucose monitoring.  Patient would benefit from ongoing weight loss and discussed pros/cons to trying to increase Ozempic now vs later and given her good levels right now and the challenges with finding supply of the 1mg dose, would like to continue one additional month at this level before considering dose titration. Over due to see Dr. Parks and again encouraged setting this up as soon as possible.    PLAN:                            1. Keep scanning sensor to stay accountable.     2. Set up office visit to see Dr. Parks.     3. Continue for another month at 0.5mg Ozempic dose then consider dose increase to 1mg for ongoing weight loss benefits.     Follow-up: Return in about 1 week (around 9/19/2022) for Primary Care Provider, Lab Work.    SUBJECTIVE/OBJECTIVE:                          Donna Elmore is a 62 year old female called for a follow-up visit.  Today's visit is a follow-up MTM visit from 8/15     Reason for visit: diabetes follow up.    Allergies/ADRs: Reviewed in chart  Past Medical History: Reviewed in chart  Tobacco: She reports that she has never smoked. She has never used smokeless tobacco.     Medication Adherence/Access: no issues reported    Type 2 Diabetes/Obesity:  Currently taking metformin ER 500mg 1 daily (tolerating this much better, diarrhea improved from higher doses) and Ozempic 0.5mg weekly- (replaced Semglee 12 units daily).   Patient is not experiencing side effects. Feeling great.   Blood sugar monitoring: CGM Ranges (patient reported): See Below  Symptoms of low  blood sugar? none  Symptoms of high blood sugar? Previously polydipsia, fatigue, yeast infections improving, still having polyuria.   Diet/Exercise: switching diet completely, met with [facility] dietician. Feeling great and food tastes good.   Lots of water, eating much more vegetables and finding healthy ways to make sustainable changes in her day to day.   [employer] kellen has been helping a lot. Connecting in different support circles in the [employer] apps. Also now using [employer] and feels this is a great addition.   Aspirin: not at this time  Statin: No   ACEi/ARB: No.    Stressful summer, so weight has been going back up vs down.           ----------------      I spent 12 minutes with this patient today. All changes were made via collaborative practice agreement with Sreedhar Parks MD. A copy of the visit note was provided to the patient's provider(s).    The patient was sent via [employer] a summary of these recommendations.     Dorothy Wylie, Pharm.D, BCACP  Medication Therapy Management Pharmacist  [phone]      Telemedicine Visit Details  Type of service:  Telephone visit  Start Time: 1:48 PM  End Time: 2:00 PM  Originating Location (patient location): Home  Distant Location (provider location):  [facility]     Medication Therapy Recommendations  No medication therapy recommendations to display

## 2022-09-13 DIAGNOSIS — E11.9 TYPE 2 DIABETES MELLITUS WITHOUT COMPLICATION, WITHOUT LONG-TERM CURRENT USE OF INSULIN (H): ICD-10-CM

## 2022-09-13 RX ORDER — METFORMIN HCL 500 MG
TABLET, EXTENDED RELEASE 24 HR ORAL
Qty: 60 TABLET | Refills: 1 | Status: SHIPPED | OUTPATIENT
Start: 2022-09-13 | End: 2023-10-23

## 2022-09-13 NOTE — TELEPHONE ENCOUNTER
metFORMIN (GLUCOPHAGE XR) 500 MG 24 hr tablet    LOV 9/12/22 VV with MTM Dorothy S  Otherwise LOV 2/4/22 with JG  Last labs 12/31/21    Lab Results   Component Value Date    A1C 11.9 12/13/2021    A1C 11.7 12/09/2021    A1C 7.6 09/03/2021    A1C 6.8 12/14/2020    A1C 5.8 12/19/2019     Has appt 10/17/22 with MTM    Type 2 Diabetes/Obesity:  Currently taking metformin ER 500mg 1 daily (tolerating this much better, diarrhea improved from higher doses) and Ozempic 0.5mg weekly- (replaced Semglee 12 units daily).   Notes not yet completed

## 2022-09-14 NOTE — PATIENT INSTRUCTIONS
"Recommendations from today's MTM visit:                                                         1. Keep scanning sensor  :)      2. Set up office visit to see Dr. Parks and get updated labs! (We last did A1c in December last year!)     3. Continue for another month at 0.5mg Ozempic dose then consider dose increase to 1mg for ongoing weight loss benefits.     Follow-up: Return in about 1 week (around 9/19/2022) for Primary Care Provider, Lab Work., then MTM On 10/17/22 for phone call.     It was great speaking with you today.  I value your experience and would be very thankful for your time in providing feedback in our clinic survey. In the next few days, you may receive an email or text message from [employer] with a link to a survey related to your  clinical pharmacist.\"       My Clinical Pharmacist's contact information:                                                      Please feel free to contact me with any questions or concerns you have.      Dorothy Wylie, Pharm.D, BCACP  Medication Therapy Management Pharmacist  [phone]     "

## 2022-10-23 ENCOUNTER — HEALTH MAINTENANCE LETTER (OUTPATIENT)
Age: 62
End: 2022-10-23

## 2022-10-31 ENCOUNTER — VIRTUAL VISIT (OUTPATIENT)
Dept: PHARMACY | Facility: PHYSICIAN GROUP | Age: 62
End: 2022-10-31
Payer: COMMERCIAL

## 2022-10-31 DIAGNOSIS — E66.01 MORBID OBESITY (H): ICD-10-CM

## 2022-10-31 DIAGNOSIS — F43.21 GRIEF: Primary | ICD-10-CM

## 2022-10-31 DIAGNOSIS — E11.9 TYPE 2 DIABETES MELLITUS WITHOUT COMPLICATION, WITHOUT LONG-TERM CURRENT USE OF INSULIN (H): ICD-10-CM

## 2022-10-31 PROCEDURE — 99606 MTMS BY PHARM EST 15 MIN: CPT | Performed by: PHARMACIST

## 2022-10-31 RX ORDER — SEMAGLUTIDE 1.34 MG/ML
0.5 INJECTION, SOLUTION SUBCUTANEOUS
Qty: 4.5 ML | Refills: 1 | Status: SHIPPED | OUTPATIENT
Start: 2022-10-31 | Stop reason: DRUGHIGH

## 2022-10-31 NOTE — PATIENT INSTRUCTIONS
"Recommendations from today's MTM visit:                                                       1. Recommend office visit with Dr. Parks to check in. Continue your community supports as well- these are very important.     2. Call me if you get updated information on the new insurance plan.       Follow-up: Return in 4 weeks (on 11/28/2022) for Phone call with MTM.    It was great speaking with you today.  I value your experience and would be very thankful for your time in providing feedback in our clinic survey. In the next few days, you may receive an email or text message from [employer] with a link to a survey related to your  clinical pharmacist.\"     My Clinical Pharmacist's contact information:                                                      Please feel free to contact me with any questions or concerns you have.      Dorothy Wylie, Pharm.D, [facility]  Medication Therapy Management Pharmacist  [phone]     "

## 2022-10-31 NOTE — PROGRESS NOTES
Medication Therapy Management (MTM) Encounter    ASSESSMENT:                            Medication Adherence/Access: will need to monitor if formulary of new insurance requires changes in therapy.     Grief: continued community and professional support encouraged. Focus of visit was on supportive listening.     Type 2 Diabetes/obesity: Patient is not meeting A1c goal of < 7% as we need updated labs for safety and efficacy of current regimen. Patient is meeting average glucose goal of <150mg/dL. Patient is meeting goal of >70% time in target with continuous glucose monitoring.  Patient would benefit from coming into office for updated blood work from a safety standpoint. Has seen good effects with continuous glucose monitoring.    PLAN:                            1. Recommend office visit with Dr. Parks to check in. Continue your community supports as well- these are very important.     2. Call me if you get updated information on the new insurance plan.     Follow-up: Return in 4 weeks (on 11/28/2022) for Phone call with MTM.    SUBJECTIVE/OBJECTIVE:                          Donna Elmore is a 62 year old female called for a follow-up visit.  Today's visit is a follow-up MTM visit from 9/12     Reason for visit: medication follow up.    Allergies/ADRs: Reviewed in chart  Past Medical History: Reviewed in chart  Tobacco: She reports that she has never smoked. She has never used smokeless tobacco.  Alcohol: not currently using    Medication Adherence/Access: no issues reported    Grief: recent death of  that was abrupt and unexpected. Processing this loss and working through the legal, medical and logistical steps since his passing. She will be transitioning to new insurance as she was under his insurance while being unemployed. She has been approved for MA but still figuring out what plan specifically will be used.   Has been seeing a therapist, connections with her [employer] and family.   Utilizing [employer]  [facility] in [place] as well.     Type 2 Diabetes/Obesity:  Currently taking metformin ER 500mg 1 daily (GI issues at higher doses) and Ozempic 0.5mg weekly- (replaced Semglee 12 units daily). Needs to get refill on the Ozempic before insurance changes.   Symptoms of low blood sugar? none  Symptoms of high blood sugar? none  Aspirin: not at this time  Statin: No   ACEi/ARB: No.          ----------------      I spent 15 minutes with this patient today. All changes were made via collaborative practice agreement with Sreedhar Parks MD. A copy of the visit note was provided to the patient's provider(s).    The patient was sent via [employer] a summary of these recommendations.     Dorothy Wylie, Pharm.D, [facility]  Medication Therapy Management Pharmacist  [phone]      Telemedicine Visit Details  Type of service:  Telephone visit  Start Time: 3:03 PM  End Time: 3:18 PM  Originating Location (pt. Location): Home   Distant Location (provider location):  On-site  Provider has received verbal consent for a visit from the patient? Yes     Medication Therapy Recommendations  No medication therapy recommendations to display

## 2022-11-08 ENCOUNTER — OFFICE VISIT (OUTPATIENT)
Dept: FAMILY MEDICINE | Facility: CLINIC | Age: 62
End: 2022-11-08

## 2022-11-08 VITALS
BODY MASS INDEX: 47.09 KG/M2 | RESPIRATION RATE: 16 BRPM | HEIGHT: 66 IN | OXYGEN SATURATION: 97 % | HEART RATE: 72 BPM | WEIGHT: 293 LBS | TEMPERATURE: 97.8 F

## 2022-11-08 DIAGNOSIS — L02.212 BACK ABSCESS: Primary | ICD-10-CM

## 2022-11-08 PROCEDURE — 99213 OFFICE O/P EST LOW 20 MIN: CPT | Performed by: FAMILY MEDICINE

## 2022-11-08 RX ORDER — DOXYCYCLINE 100 MG/1
100 TABLET ORAL
COMMUNITY
Start: 2022-11-03 | End: 2022-11-28

## 2022-11-08 NOTE — NURSING NOTE
Awaiting insurance coverage selection to be settled before blood testing.- per patient    Patient has MTM appt on 11/28/22 will discuss with Dorothy Skinner MA November 8, 2022 4:14 PM

## 2022-11-08 NOTE — PROGRESS NOTES
seb cyst packing looking good.    Assessment/Plan:  Donna was seen today for wound check.    Diagnoses and all orders for this visit:    Back abscess    Recheck every 2-3 days and remove some packing  Dennis Narayanan MD   [facility]  [phone]            Answers for HPI/ROS submitted by the patient on 11/8/2022  What is the reason for your visit today? : I had a cyst lanced in the ER last Thursday that requires the packing to be replaced or removed.  How many servings of fruits and vegetables do you eat daily?: 0-1  On average, how many sweetened beverages do you drink each day (Examples: soda, juice, sweet tea, etc.  Do NOT count diet or artificially sweetened beverages)?: 0  How many minutes a day do you exercise enough to make your heart beat faster?: 9 or less  How many days a week do you exercise enough to make your heart beat faster?: 3 or less  How many days per week do you miss taking your medication?: 0

## 2022-11-10 ENCOUNTER — OFFICE VISIT (OUTPATIENT)
Dept: FAMILY MEDICINE | Facility: CLINIC | Age: 62
End: 2022-11-10

## 2022-11-10 VITALS — SYSTOLIC BLOOD PRESSURE: 136 MMHG | DIASTOLIC BLOOD PRESSURE: 86 MMHG

## 2022-11-10 DIAGNOSIS — Z13.6 SCREENING FOR CARDIOVASCULAR CONDITION: ICD-10-CM

## 2022-11-10 DIAGNOSIS — Z12.31 VISIT FOR SCREENING MAMMOGRAM: Primary | ICD-10-CM

## 2022-11-10 DIAGNOSIS — Z12.11 SCREEN FOR COLON CANCER: ICD-10-CM

## 2022-11-10 DIAGNOSIS — E11.9 TYPE 2 DIABETES MELLITUS WITHOUT COMPLICATION, WITHOUT LONG-TERM CURRENT USE OF INSULIN (H): ICD-10-CM

## 2022-11-10 DIAGNOSIS — Z23 NEED FOR PNEUMOCOCCAL VACCINATION: ICD-10-CM

## 2022-11-10 LAB — HBA1C MFR BLD: 6.2 % (ref 4–6)

## 2022-11-10 PROCEDURE — 99213 OFFICE O/P EST LOW 20 MIN: CPT | Mod: 25 | Performed by: FAMILY MEDICINE

## 2022-11-10 PROCEDURE — 83036 HEMOGLOBIN GLYCOSYLATED A1C: CPT | Performed by: FAMILY MEDICINE

## 2022-11-10 PROCEDURE — 90677 PCV20 VACCINE IM: CPT | Performed by: FAMILY MEDICINE

## 2022-11-10 PROCEDURE — 90471 IMMUNIZATION ADMIN: CPT | Mod: 59 | Performed by: FAMILY MEDICINE

## 2022-11-10 PROCEDURE — 36415 COLL VENOUS BLD VENIPUNCTURE: CPT | Performed by: FAMILY MEDICINE

## 2022-11-10 NOTE — PROGRESS NOTES
Answers for HPI/ROS submitted by the patient on 11/8/2022  What is the reason for your visit today? : I had a cyst lanced in the ER last Thursday that requires the packing to be replaced or removed.  How many servings of fruits and vegetables do you eat daily?: 0-1  On average, how many sweetened beverages do you drink each day (Examples: soda, juice, sweet tea, etc.  Do NOT count diet or artificially sweetened beverages)?: 0  How many minutes a day do you exercise enough to make your heart beat faster?: 9 or less  How many days a week do you exercise enough to make your heart beat faster?: 3 or less  How many days per week do you miss taking your medication?: 0    Recheck packing   Drain came out..  Needs labs    Assessment/Plan:  Donna was seen today for recheck and health maintenance.    Diagnoses and all orders for this visit:    Visit for screening mammogram  -     MA Screening Digital Bilateral; Future    Screen for colon cancer  -     Adult GI  Referral - Procedure Only; Future  -     COLOGUARD([employer])    Type 2 diabetes mellitus without complication, without long-term current use of insulin (H)  -     Microalbumin ([facility])  -     Hemoglobin A1C ([facility])  -     Basic Metabolic Panel (8) ([facility])  -     VENOUS COLLECTION    Screening for cardiovascular condition  -     Lipid Profile ([facility])  -     VENOUS COLLECTION    Need for pneumococcal vaccination  -     PNEUMOCOCCAL 20 VALENT CONJUGATE (PREVNAR 20)  -     VACCINE ADMINISTRATION, INITIAL      Dennis Narayanan MD   [facility]  [phone]

## 2022-11-11 LAB
BUN SERPL-MCNC: 22 MG/DL (ref 8–27)
BUN/CREATININE RATIO: 30 (ref 12–28)
CALCIUM SERPL-MCNC: 9.3 MG/DL (ref 8.7–10.3)
CHLORIDE SERPLBLD-SCNC: 103 MMOL/L (ref 96–106)
CHOLEST SERPL-MCNC: 122 MG/DL (ref 100–199)
CREAT SERPL-MCNC: 0.74 MG/DL (ref 0.57–1)
EGFR: 92 ML/MIN/1.73
GLUCOSE SERPL-MCNC: 92 MG/DL (ref 70–99)
HDLC SERPL-MCNC: 50 MG/DL
LDL/HDL RATIO: 1 RATIO (ref 0–3.2)
LDLC SERPL CALC-MCNC: 52 MG/DL (ref 0–99)
POTASSIUM SERPL-SCNC: 4.8 MMOL/L (ref 3.5–5.2)
SODIUM SERPL-SCNC: 141 MMOL/L (ref 134–144)
TOTAL CO2: 24 MMOL/L (ref 20–29)
TRIGL SERPL-MCNC: 107 MG/DL (ref 0–149)
VLDLC SERPL CALC-MCNC: 20 MG/DL (ref 5–40)

## 2022-11-11 NOTE — RESULT ENCOUNTER NOTE
Dear Donna,     I am writing to report that your included test results are as expected.    Many labs contain some results that are slightly outside of the normal range, I have reviewed any of these results and they require no changes at this time.    Dennis Narayanan MD

## 2022-11-19 ENCOUNTER — MYC MEDICAL ADVICE (OUTPATIENT)
Dept: PHARMACY | Facility: PHYSICIAN GROUP | Age: 62
End: 2022-11-19

## 2022-11-19 DIAGNOSIS — E11.9 TYPE 2 DIABETES MELLITUS WITHOUT COMPLICATION, WITHOUT LONG-TERM CURRENT USE OF INSULIN (H): Primary | ICD-10-CM

## 2022-11-21 NOTE — TELEPHONE ENCOUNTER
Updated sensor orders per cpa with Dr. Narayanan as he just completed visit for her with labs.     Dorothy Wylie, Pharm.D, [facility]  Medication Therapy Management Pharmacist  [phone]

## 2022-11-28 ENCOUNTER — VIRTUAL VISIT (OUTPATIENT)
Dept: PHARMACY | Facility: PHYSICIAN GROUP | Age: 62
End: 2022-11-28
Payer: MEDICAID

## 2022-11-28 DIAGNOSIS — I10 PRIMARY HYPERTENSION: ICD-10-CM

## 2022-11-28 DIAGNOSIS — E11.9 TYPE 2 DIABETES MELLITUS WITHOUT COMPLICATION, WITHOUT LONG-TERM CURRENT USE OF INSULIN (H): Primary | ICD-10-CM

## 2022-11-28 DIAGNOSIS — I47.10 SVT (SUPRAVENTRICULAR TACHYCARDIA) (H): ICD-10-CM

## 2022-11-28 DIAGNOSIS — Z78.9 TAKES DIETARY SUPPLEMENTS: ICD-10-CM

## 2022-11-28 DIAGNOSIS — Z13.220 SCREENING FOR HYPERLIPIDEMIA: ICD-10-CM

## 2022-11-28 DIAGNOSIS — E66.01 MORBID OBESITY (H): ICD-10-CM

## 2022-11-28 PROCEDURE — 99605 MTMS BY PHARM NP 15 MIN: CPT | Mod: 93 | Performed by: PHARMACIST

## 2022-11-28 NOTE — PATIENT INSTRUCTIONS
"Recommendations from today's MTM visit:                                                       1. No changes at this time. Could consider further increase in Ozempic in the future to help with additional weight loss goals if interested. Call with any questions or concerns.       Follow-up: Return in 8 weeks (on 1/23/2023) for Phone call with MTM.    It was great speaking with you today.  I value your experience and would be very thankful for your time in providing feedback in our clinic survey. In the next few days, you may receive an email or text message from [employer] with a link to a survey related to your  clinical pharmacist.\"       My Clinical Pharmacist's contact information:                                                      Please feel free to contact me with any questions or concerns you have.      Dorothy Wylie, Pharm.D, [facility]  Medication Therapy Management Pharmacist  [phone]     "

## 2022-12-15 ENCOUNTER — MYC MEDICAL ADVICE (OUTPATIENT)
Dept: PHARMACY | Facility: PHYSICIAN GROUP | Age: 62
End: 2022-12-15

## 2022-12-15 DIAGNOSIS — Z20.822 EXPOSURE TO 2019 NOVEL CORONAVIRUS: Primary | ICD-10-CM

## 2022-12-15 RX ORDER — COVID-19 ANTIGEN TEST
1 KIT MISCELLANEOUS
Qty: 1 KIT | Refills: 1 | Status: SHIPPED | OUTPATIENT
Start: 2022-12-15 | End: 2023-03-14

## 2022-12-15 NOTE — TELEPHONE ENCOUNTER
Patient requests prescription for at home Covid testing. Prescription sent to pharmacy. Laura Medina

## 2023-02-14 ENCOUNTER — VIRTUAL VISIT (OUTPATIENT)
Dept: PHARMACY | Facility: PHYSICIAN GROUP | Age: 63
End: 2023-02-14
Payer: COMMERCIAL

## 2023-02-14 DIAGNOSIS — E66.01 MORBID OBESITY (H): ICD-10-CM

## 2023-02-14 DIAGNOSIS — Z13.220 SCREENING FOR HYPERLIPIDEMIA: ICD-10-CM

## 2023-02-14 DIAGNOSIS — I47.10 SVT (SUPRAVENTRICULAR TACHYCARDIA) (H): ICD-10-CM

## 2023-02-14 DIAGNOSIS — Z78.9 TAKES DIETARY SUPPLEMENTS: ICD-10-CM

## 2023-02-14 DIAGNOSIS — I10 PRIMARY HYPERTENSION: ICD-10-CM

## 2023-02-14 DIAGNOSIS — E11.9 TYPE 2 DIABETES MELLITUS WITHOUT COMPLICATION, WITHOUT LONG-TERM CURRENT USE OF INSULIN (H): Primary | ICD-10-CM

## 2023-02-14 PROCEDURE — 99605 MTMS BY PHARM NP 15 MIN: CPT | Performed by: PHARMACIST

## 2023-02-14 PROCEDURE — 99607 MTMS BY PHARM ADDL 15 MIN: CPT | Performed by: PHARMACIST

## 2023-02-14 RX ORDER — SEMAGLUTIDE 1.34 MG/ML
1 INJECTION, SOLUTION SUBCUTANEOUS WEEKLY
Qty: 9 ML | Refills: 3 | Status: SHIPPED | OUTPATIENT
Start: 2023-02-14 | End: 2024-02-19

## 2023-02-14 NOTE — PATIENT INSTRUCTIONS
"Recommendations from today's MTM visit:                                                         1. Increase to 1mg Ozempic, if we cannot get this covered under new insurance then will plan to switch Victoza 1.2mg daily (Qty 6ml for 28 days).    Follow-up: No follow-ups on file.    It was great speaking with you today.  I value your experience and would be very thankful for your time in providing feedback in our clinic survey. In the next few days, you may receive an email or text message from [employer] with a link to a survey related to your  clinical pharmacist.\"       My Clinical Pharmacist's contact information:                                                      Please feel free to contact me with any questions or concerns you have.      Dorothy Wylie, Pharm.D, [facility]  Medication Therapy Management Pharmacist  [phone]     "

## 2023-02-14 NOTE — PROGRESS NOTES
Medication Therapy Management (MTM) Encounter    ASSESSMENT:                            Medication Adherence/Access: No issues identified    Type 2 Diabetes: Patient is meeting A1c goal of < 7%. Patient is meeting average glucose goal of <150mg/dL. Patient is meeting goal of >70% time in target with continuous glucose monitoring.  Patient would benefit from increase in GLP1 agonist to further aid in weight loss goals. Discussed possible need to change to Victoza if insurance will not allow for ongoing use of Ozempic due to formulary. Has done very well on Ozempic and would prefer to stay with it if able, but if not then plan to switch to Victoza 1.2mg daily trial.    Hypertension/SVT: Stable.    Screening for Hyperlipidemia: Stable, LDL <70mg/dl at this time.    Supplements: Stable.    Grief:  Stable.    PLAN:                            1. Increase to 1mg Ozempic, if we cannot get this covered under new insurance then will plan to switch Victoza 1.2mg daily (Qty 6ml for 28 days).    Follow-up: Return in 4 weeks (on 3/14/2023) for Phone call with MTM.    SUBJECTIVE/OBJECTIVE:                          Donna Elmore is a 62 year old female called for a follow-up visit.  Today's visit is a follow-up MTM visit from 11/28/22     Reason for visit: First visit of the year.    Allergies/ADRs: Reviewed in chart  Past Medical History: Reviewed in chart  Tobacco: She reports that she has never smoked. She has never used smokeless tobacco.  Alcohol: not currently using    Medication Adherence/Access: no issues reported    Type 2 Diabetes/Obesity:  Currently taking metformin ER 500mg 1 daily (GI issues at higher doses) and Ozempic 0.5mg weekly (replaced Semglee 12 units daily).   Symptoms of low blood sugar? none  Symptoms of high blood sugar? none  Aspirin: not at this time  Statin: No LDL< 70mg/dl.  ACEi/ARB: No.    Lab Results   Component Value Date    A1C 6.2 11/10/2022    A1C 11.9 12/13/2021    A1C 11.7 12/09/2021    A1C  7.6 09/03/2021    A1C 6.8 12/14/2020           Hypertension/SVT: Current medications include metoprolol ER 100mg daily and occasionally taking furosemide 20mg daily. Denies issues at this time. Microalbumin was not able to be tested with last office visit. Patient does not self-monitor blood pressure.  Patient reports no current medication side effects.  History of supraventricular tachycardia with ablation in Jan 2010. Had been on Lisinopril in 2010, but after hysterectomy was stopped and continued on metoprolol alone.   BP Readings from Last 3 Encounters:   11/10/22 136/86   02/04/22 126/86   02/01/22 (!) 128/92       Screening for Hyperlipidemia: Current therapy includes no statin.   Recent Labs   Lab Test 11/10/22  1350 12/19/19  0915 08/27/18  1133 11/05/15  0809   CHOL 122 142   < > 117   HDL 50 59   < > 43*   LDL 52 75   < > 64*   TRIG 107 42   < > 50   CHOLHDLRATIO  --   --   --  2.7    < > = values in this interval not displayed.       Supplements: Currently taking zinc, elderberry, probiotic, magnesium and vitamin D. No reported issues at this time.     Grief:  Oct 2022 death of  that was abrupt and unexpected.   Hired  to help sort through the house and home  to lessen some stress on her.   Family is coming in April to visit, so this is target to clean out house.   Therapist going well.   Utilizing [facility] in [place] as well.   Connections with her [employer] and family, sister adopted a puppy which is a celebration she is happy about.      ----------------    I spent 19 minutes with this patient today. All changes were made via collaborative practice agreement with Sreedhar Parks MD. A copy of the visit note was provided to the patient's provider(s).    A summary of these recommendations was sent via [employer].    Dorothy Wylie, Pharm.D, [facility]  Medication Therapy Management Pharmacist  [phone]      Telemedicine Visit Details  Type of service:   Telephone visit  Start Time: 11:16 AM   End Time: 11:35 AM     Medication Therapy Recommendations  Type 2 diabetes mellitus without complication, without long-term current use of insulin (H)    Current Medication: semaglutide (OZEMPIC, 0.25 OR 0.5 MG/DOSE,) 2 MG/1.5ML SOPN pen (Discontinued)   Rationale: Dose too low - Dosage too low - Effectiveness   Recommendation: Increase Dose - Ozempic (1 MG/DOSE) 2 MG/1.5ML Sopn - 1mg weekly   Status: Accepted per CPA

## 2023-02-14 NOTE — Clinical Note
Fyi- she did see [facility] for cpx in Nov with updated labs, doing much better.   Dorothy Wylie, Pharm.D, [facility] Medication Therapy Management Pharmacist [phone]

## 2023-02-20 ENCOUNTER — TELEPHONE (OUTPATIENT)
Dept: FAMILY MEDICINE | Facility: CLINIC | Age: 63
End: 2023-02-20

## 2023-02-20 NOTE — TELEPHONE ENCOUNTER
Prior authorization done via CoverMeds for Ozempic. Prior authorization approved 1/1/23-2/18/2024. Patient notified and she was able to fill this medication yesterday. Laura Medina

## 2023-03-13 ENCOUNTER — OFFICE VISIT (OUTPATIENT)
Dept: FAMILY MEDICINE | Facility: CLINIC | Age: 63
End: 2023-03-13

## 2023-03-13 DIAGNOSIS — E66.01 MORBID OBESITY (H): ICD-10-CM

## 2023-03-13 DIAGNOSIS — R05.1 ACUTE COUGH: ICD-10-CM

## 2023-03-13 DIAGNOSIS — B34.9 VIRAL ILLNESS: ICD-10-CM

## 2023-03-13 DIAGNOSIS — U07.1 INFECTION DUE TO 2019 NOVEL CORONAVIRUS: Primary | ICD-10-CM

## 2023-03-13 DIAGNOSIS — E11.9 TYPE 2 DIABETES MELLITUS WITHOUT COMPLICATION, WITHOUT LONG-TERM CURRENT USE OF INSULIN (H): ICD-10-CM

## 2023-03-13 PROCEDURE — 99213 OFFICE O/P EST LOW 20 MIN: CPT | Mod: GT | Performed by: FAMILY MEDICINE

## 2023-03-14 ENCOUNTER — VIRTUAL VISIT (OUTPATIENT)
Dept: PHARMACY | Facility: PHYSICIAN GROUP | Age: 63
End: 2023-03-14
Payer: COMMERCIAL

## 2023-03-14 DIAGNOSIS — Z20.822 EXPOSURE TO 2019 NOVEL CORONAVIRUS: ICD-10-CM

## 2023-03-14 DIAGNOSIS — E11.9 TYPE 2 DIABETES MELLITUS WITHOUT COMPLICATION, WITHOUT LONG-TERM CURRENT USE OF INSULIN (H): Primary | ICD-10-CM

## 2023-03-14 PROCEDURE — 99606 MTMS BY PHARM EST 15 MIN: CPT | Performed by: PHARMACIST

## 2023-03-14 NOTE — PATIENT INSTRUCTIONS
"Recommendations from today's MTM visit:                                                       1. No changes at this time.    Follow-up: Return in 4 weeks (on 4/11/2023) for Phone call with MTM.    It was great speaking with you today.  I value your experience and would be very thankful for your time in providing feedback in our clinic survey. In the next few days, you may receive an email or text message from [facility] [employer] with a link to a survey related to your  clinical pharmacist.\"     My Clinical Pharmacist's contact information:                                                      Please feel free to contact me with any questions or concerns you have.      Dorothy Wylie, Pharm.D, BCACP  Medication Therapy Management Pharmacist  [phone]      "

## 2023-03-14 NOTE — PROGRESS NOTES
Medication Therapy Management (MTM) Encounter    ASSESSMENT:                            Medication Adherence/Access: No issues identified    Type 2 Diabetes: Patient is meeting A1c goal of < 7%. Patient is meeting average glucose goal of <150mg/dL. Patient is meeting goal of > 70% time in target with continuous glucose monitoring.  Patient would benefit from no changes at this time for sugar control. .    COVID 19: will finish Paxlovid course and seek medical care if worsening.     PLAN:                            1. No changes at this time.     Follow-up: Return in 4 weeks (on 4/11/2023) for Phone call with MTM.    SUBJECTIVE/OBJECTIVE:                          Donna Elmore is a 62 year old female called for a follow-up visit.  Today's visit is a follow-up MTM visit from 2/14/23     Reason for visit: Medication follow up.    Allergies/ADRs: Reviewed in chart  Past Medical History: Reviewed in chart  Tobacco: She reports that she has never smoked. She has never used smokeless tobacco.  Alcohol: not currently using    Medication Adherence/Access: no issues reported    Type 2 Diabetes:   metformin ER 500mg 1 daily (GI issues at higher doses)   Ozempic 1mg weekly (replaced Semglee 12 units daily) - tolerating well  Blood Sugar Ranges (patient reported): see below  Symptoms of low blood sugar? none.   Symptoms of high blood sugar? none  BP Readings from Last 3 Encounters:   11/10/22 136/86   02/04/22 126/86   02/01/22 (!) 128/92     Recent Labs   Lab Test 11/10/22  1350 12/19/19  0915 08/27/18  1133 11/05/15  0809   CHOL 122 142   < > 117   HDL 50 59   < > 43*   LDL 52 75   < > 64*   TRIG 107 42   < > 50   CHOLHDLRATIO  --   --   --  2.7    < > = values in this interval not displayed.       Lab Results   Component Value Date    A1C 6.2 11/10/2022    A1C 11.9 12/13/2021    A1C 11.7 12/09/2021    A1C 7.6 09/03/2021    A1C 6.8 12/14/2020               COVID-19: tested positive yesterday, started Paxlovid early this AM.  Mostly congestion that is making it harder to sleep. Had a friend drop of a sinus lavage.     ----------------    I spent 7 minutes with this patient today. All changes were made via collaborative practice agreement with Sreedhar Parks MD. A copy of the visit note was provided to the patient's provider(s).    A summary of these recommendations was sent via [employer].    Dorothy Wylie, Pharm.D, [facility]  Medication Therapy Management Pharmacist  [phone]      Telemedicine Visit Details  Type of service:  Telephone visit  Start Time: 11:12 AM  End Time: 11:19 AM     Medication Therapy Recommendations  No medication therapy recommendations to display

## 2023-03-14 NOTE — PROGRESS NOTES
"    Video Problem(s) Oriented visit      Video Start Time: 11:05 PM    The patient has been notified of following:     \"This video visit will be conducted via a call between you and your physician/provider. We have found that certain health care needs can be provided without the need for an in-person physical exam.  This service lets us provide the care you need with a video conversation.  If a prescription is necessary we can send it directly to your pharmacy.  If lab work is needed we can place an order for that and you can then stop by our lab to have the test done at a later time.    Video visits are billed at different rates depending on your insurance coverage.  Please reach out to your insurance provider with any questions.    If during the course of the call the physician/provider feels a video visit is not appropriate, you will not be charged for this service.\"    Patient has given verbal consent for Video visit? Yes    How would you like to obtain your AVS? MyChart    Will anyone else be joining your video visit? No  SUBJECTIVE:                                                    Donna Elmore is a 62 year old female who presents to clinic today for the following health issues :        COVID-19 Symptom Review  How many days ago did these symptoms start?  2d    Are any of the following symptoms significant for you?  New or worsening difficulty breathing? Yes    Please describe what kind of difficulty you are having breathing:Mild dyspnea (able to do ADLs without difficulty, mild shortness of breath with activities such as climbing one or two flights of stairs or walking briskly)    Worsening cough? Yes, it's a dry cough.     Fever or chills? Yes, I felt feverish or had chills.    Headache: No    Sore throat: YES    Chest pain: No    Diarrhea: No    Body aches? YES    What treatments has patient tried? Nonsteroidals   Does patient live in a nursing home, group home, or shelter? No  Does patient have a way " to get food/medications during quarantined? Yes, I have a friend or family member who can help me.    1. Infection due to 2019 novel coronavirus  Complicated by    2. Morbid obesity (H)  And   3. Type 2 diabetes mellitus without complication, without long-term current use of insulin (H)  And with   4. Acute cough    Problem list, Medication list, Allergies, and Medical/Social/Surgical histories reviewed in [facility] and updated as appropriate.   Additional history: as documented    ROS:  General and Resp. completed and negative except as noted above    Histories:   Patient Active Problem List   Diagnosis     SVT (supraventricular tachycardia) (H)     Major depressive disorder, recurrent episode, mild (H)     Osteoarthritis of left knee     HTN (hypertension)     Morbid obesity (H)     Type 2 diabetes mellitus without complication, without long-term current use of insulin (H)     H/O: hysterectomy     Past Surgical History:   Procedure Laterality Date     ------------OTHER-------------  7-2011    cyst removal - back     CATHETER, ABLATION  1-2010    tachycardia ablation     D & C  3-2010     HYSTERECTOMY RADICAL  3-2010     LAPAROSCOPIC APPENDECTOMY  9/27/2013    Procedure: LAPAROSCOPIC APPENDECTOMY;  LAPAROSCOPIC APPENDECTOMY;  Surgeon: Noreen Brambila MD;  Location:  OR       Social History     Tobacco Use     Smoking status: Never     Smokeless tobacco: Never   Substance Use Topics     Alcohol use: No     Family History   Problem Relation Age of Onset     Heart Disease Mother         pacemaker, heart failure     Thyroid Disease Mother      Hypertension Mother      Cancer Father         kidney     Heart Disease Father      Hypertension Father      Thyroid Disease Sister            OBJECTIVE:                                                    There were no vitals taken for this visit.  There is no height or weight on file to calculate BMI.   APPEARANCE: = alert and mild distress  Breath Sounds = Good air movement with  no rales or rhonchi in any lung fields     ASSESSMENT/PLAN:                                                        Diagnoses and all orders for this visit:    Infection due to 2019 novel coronavirus  -     nirmatrelvir and ritonavir (PAXLOVID) 300 mg/100 mg therapy pack; Take 3 tablets by mouth 2 times daily for 5 days (Take 2 Nirmatrelvir tablets and 1 Ritonavir tablet twice daily for 5 days)    Morbid obesity (H)    Type 2 diabetes mellitus without complication, without long-term current use of insulin (H)    Acute cough    Viral illness        See Patient Instructions  There are no Patient Instructions on file for this visit.    The following health maintenance items are reviewed in Epic and correct as of today:  Health Maintenance   Topic Date Due     YEARLY PREVENTIVE VISIT  Never done     ADVANCE CARE PLANNING  Never done     COLORECTAL CANCER SCREENING  Never done     MAMMO SCREENING  03/28/2013     ZOSTER IMMUNIZATION (2 of 2) 10/22/2018     MICROALBUMIN  12/14/2021     PHQ-9  08/01/2022     DIABETIC FOOT EXAM  12/13/2022     A1C  05/10/2023     EYE EXAM  10/25/2023     BMP  11/10/2023     LIPID  11/10/2023     DTAP/TDAP/TD IMMUNIZATION (3 - Td or Tdap) 08/27/2028     HEPATITIS C SCREENING  Completed     DEPRESSION ACTION PLAN  Completed     INFLUENZA VACCINE  Completed     Pneumococcal Vaccine: Pediatrics (0 to 5 Years) and At-Risk Patients (6 to 64 Years)  Completed     COVID-19 Vaccine  Completed     IPV IMMUNIZATION  Aged Out     MENINGITIS IMMUNIZATION  Aged Out     HIV SCREENING  Discontinued     PAP  Discontinued       Video-Visit Details    This visit is being conducted as a virtual visit due to the emphasis on mitigation of the COVID-19 virus pandemic.   Type of service:  Video Visit    Originating Location (pt. Location): Home    Distant Location (provider location):  [facility]     Platform used for Video Visit: Unable to connect with video, audio only available    Dennis Narayanan,  MD  [facility]  [phone]    Video End Time:11:28 PM  For any issues my office # is [phone]

## 2023-04-02 ENCOUNTER — HEALTH MAINTENANCE LETTER (OUTPATIENT)
Age: 63
End: 2023-04-02

## 2023-04-11 ENCOUNTER — VIRTUAL VISIT (OUTPATIENT)
Dept: PHARMACY | Facility: PHYSICIAN GROUP | Age: 63
End: 2023-04-11
Payer: COMMERCIAL

## 2023-04-11 DIAGNOSIS — E11.9 TYPE 2 DIABETES MELLITUS WITHOUT COMPLICATION, WITHOUT LONG-TERM CURRENT USE OF INSULIN (H): Primary | ICD-10-CM

## 2023-04-11 PROCEDURE — 99606 MTMS BY PHARM EST 15 MIN: CPT | Mod: 93 | Performed by: PHARMACIST

## 2023-04-11 NOTE — PROGRESS NOTES
Medication Therapy Management (MTM) Encounter    ASSESSMENT:                            Medication Adherence/Access: No issues identified    Type 2 Diabetes:  A1c at goal of <7%. Patient is meeting average glucose goal of <150mg/dL. Patient is meeting goal of > 70% time in target with continuous glucose monitoring. Patient would benefit from no changes at this time.    PLAN:                            1. Set up follow up in clinic     Follow-up: Return in about 4 weeks (around 5/9/2023) for Lab Work, Primary Care Provider, MTM visit in clinic.    SUBJECTIVE/OBJECTIVE:                          Donna Elmore is a 63 year old female called for a follow-up visit.  Today's visit is a follow-up MTM visit from 3/14     Reason for visit: Medication follow up.    Allergies/ADRs: Reviewed in chart  Past Medical History: Reviewed in chart  Tobacco: She reports that she has never smoked. She has never used smokeless tobacco.  Alcohol: not currently using    Medication Adherence/Access: no issues reported    Type 2 Diabetes:   metformin ER 500mg 1 daily (GI issues at higher doses)   Ozempic 1mg weekly (replaced Semglee 12 units daily) - tolerating well, mild nausea at times so not wanting to increase dose further.   Blood Sugar Ranges (patient reported): see below  Symptoms of low blood sugar? none.   Symptoms of high blood sugar? none  BP Readings from Last 3 Encounters:   11/10/22 136/86   02/04/22 126/86   02/01/22 (!) 128/92     Recent Labs   Lab Test 11/10/22  1350 12/19/19  0915 08/27/18  1133 11/05/15  0809   CHOL 122 142   < > 117   HDL 50 59   < > 43*   LDL 52 75   < > 64*   TRIG 107 42   < > 50   CHOLHDLRATIO  --   --   --  2.7    < > = values in this interval not displayed.     Lab Results   Component Value Date    A1C 6.2 11/10/2022    A1C 11.9 12/13/2021    A1C 11.7 12/09/2021    A1C 7.6 09/03/2021    A1C 6.8 12/14/2020           ----------------    I spent 8 minutes with this patient today. All changes were made  via collaborative practice agreement with Sreedhar Pakrs MD. A copy of the visit note was provided to the patient's provider(s).    A summary of these recommendations was sent via [employer].    Dorothy Wylie, Pharm.D, [facility]  Medication Therapy Management Pharmacist  [phone]    Telemedicine Visit Details  Type of service:  Telephone visit  Start Time: 11:12 AM  End Time: 11:20 AM     Medication Therapy Recommendations  No medication therapy recommendations to display

## 2023-04-11 NOTE — PATIENT INSTRUCTIONS
"Recommendations from today's MTM visit:                                                       1. Call to set up Diabetes recheck, no changes in medication at this time.     Follow-up: Return in about 4 weeks (around 5/9/2023) for Lab Work, Primary Care Provider, MTM visit in clinic.    It was great speaking with you today.  I value your experience and would be very thankful for your time in providing feedback in our clinic survey. In the next few days, you may receive an email or text message from [employer] with a link to a survey related to your  clinical pharmacist.\"     My Clinical Pharmacist's contact information:                                                      Please feel free to contact me with any questions or concerns you have.      Dorothy Wylie, Pharm.D, [facility]  Medication Therapy Management Pharmacist  [phone]      "

## 2023-05-03 DIAGNOSIS — I10 ESSENTIAL HYPERTENSION: ICD-10-CM

## 2023-05-03 RX ORDER — METOPROLOL SUCCINATE 100 MG/1
TABLET, EXTENDED RELEASE ORAL
Qty: 90 TABLET | Refills: 3 | Status: SHIPPED | OUTPATIENT
Start: 2023-05-03 | End: 2024-07-30

## 2023-05-03 NOTE — TELEPHONE ENCOUNTER
Metoprolol   LOV 03/13/23  Last labs 11/10/22  BP Readings from Last 3 Encounters:   11/10/22 136/86   02/04/22 126/86   02/01/22 (!) 128/92

## 2023-06-01 ENCOUNTER — HEALTH MAINTENANCE LETTER (OUTPATIENT)
Age: 63
End: 2023-06-01

## 2023-07-17 ENCOUNTER — PATIENT OUTREACH (OUTPATIENT)
Dept: CARE COORDINATION | Facility: CLINIC | Age: 63
End: 2023-07-17
Payer: COMMERCIAL

## 2023-07-17 NOTE — PROGRESS NOTES
Clinic Care Coordination Contact  [facility]/Voicemail    Referral Source: IP Report. Pt was seen in ED on 7/14 for finger laceration caused by dog bite.  Clinical Data: Care Coordinator Outreach  Outreach attempted x 1.  Left message on patient's voicemail with call back information and requested return call.  Plan: Care Coordinator will try to reach patient again in 3-5 business days.    Ludmila Burrell [facility]  Social Work Care Coordinator  Phone: [phone]

## 2023-07-20 ENCOUNTER — PATIENT OUTREACH (OUTPATIENT)
Dept: CARE COORDINATION | Facility: CLINIC | Age: 63
End: 2023-07-20
Payer: COMMERCIAL

## 2023-07-20 ASSESSMENT — ACTIVITIES OF DAILY LIVING (ADL): DEPENDENT_IADLS:: INDEPENDENT

## 2023-07-20 NOTE — PROGRESS NOTES
Clinic Care Coordination Contact    Clinic Care Coordination Contact  OUTREACH    Referral Information:  Referral Source: IP Report. Pt was recently seen in ED after a dog bite on her finger.         Chief Complaint   Patient presents with     Clinic Care Coordination - [facility] Hospital     SW Outreach        [place] Utilization:      Utilization    Hospital Admissions  0             ED Visits  0             No Show Count (past year)  0                Current as of: 7/17/2023  6:45 PM              Clinical Concerns:  Current Medical Concerns:  DM2, obesity     Current Behavioral Concerns: None    Education Provided to patient: SW role      Medication Management:  Medication review status: Pt reports that she is currently taking an oral abx for the dog bite.       Functional Status:  Dependent ADLs:: Independent  Dependent IADLs:: Independent  Bed or wheelchair confined:: No  Mobility Status: Independent  Fallen 2 or more times in the past year?: No  Any fall with injury in the past year?: No    Living Situation:  Current living arrangement:: I live in a private home, I live alone  Type of residence:: Private home - stairs    Lifestyle & Psychosocial Needs:  Pt is a 63 yr old recently  female who lives in [place]. Pt shared that her  passed away unexpectedly in Oct of 2022. Pt was tearful when describing the circumstances around her 's death. Pt is seeing a therapist and is connected with some support groups and feels that she is has good support in this regard. Pt knows that she needs to schedule the colonoscopy but she doesn't feel that she can right now, she explained that she had so much trouble with the prep the last time and she is overwhelmed with grieving her spouse. Pt also mentioned that she knows that she needs to visit her PCP soon for a diabetic check.     Social Determinants of Health     Tobacco Use: Low Risk  (2/4/2022)    Patient History      Smoking Tobacco Use: Never       "Smokeless Tobacco Use: Never      Passive Exposure: Not on file   Alcohol Use: Not on file   Financial Resource Strain: Not on file   Food Insecurity: Not on file   Transportation Needs: Not on file   Physical Activity: Not on file   Stress: Not on file   Social Connections: Not on file   Intimate Partner Violence: Not on file   Depression: Not at risk (2/1/2022)    PHQ-2      PHQ-2 Score: 0   Housing Stability: Not on file        Informal Support system:: Family- sister     Resources and Interventions:  Current Resources: Therapist and grief support groups     Community Resources: Financial/Insurance  Supplies Currently Used at Home: Diabetic Supplies     Spoke with pt over the phone, she feels she is doing \"just fine\" in regards to her finger. She reports that she went to the ED mainly because they couldn't get it to stop bleeding and she was given an abx. Pt does not feel that she needs f/u with her PCP for the dog bite. She did indicate that she needs to come in soon for a diabetic check and she will call to schedule this. Offered emotional support and validated feelings. Pt denied having add'l questions/concerns for SW at this time.    Referrals Placed: None    Plan: [facility] will not plan further outreach at this time.    Ludmila Burrell, [facility]  Social Work Care Coordinator  Phone: [phone]     "

## 2023-07-21 ENCOUNTER — TELEPHONE (OUTPATIENT)
Dept: FAMILY MEDICINE | Facility: CLINIC | Age: 63
End: 2023-07-21

## 2023-07-21 NOTE — TELEPHONE ENCOUNTER
----- Message from Sreedhar Parks MD sent at 7/21/2023  8:35 AM CDT -----  Can you let patient know I have not seen her in over a year and she needs to schedule an appointment for follow up or CPE.  Thank you.    Sreedhar

## 2023-10-20 DIAGNOSIS — E11.9 TYPE 2 DIABETES MELLITUS WITHOUT COMPLICATION, WITHOUT LONG-TERM CURRENT USE OF INSULIN (H): ICD-10-CM

## 2023-10-20 NOTE — CONFIDENTIAL NOTE
Med: METFORMIN    LOV (related): 11/10/22    Last Lab: 11/10/22      Due for F/U around: OVERDUE FOR CPX OR MED CHECK    Next Appt: NONE        Lab Results   Component Value Date    A1C 6.2 11/10/2022    A1C 11.9 12/13/2021    A1C 11.7 12/09/2021    A1C 7.6 09/03/2021    A1C 6.8 12/14/2020

## 2023-10-20 NOTE — TELEPHONE ENCOUNTER
Med: SENSOR    LOV (related): 11/10/22    Last Lab: 11/10/22      Due for F/U around: OVERDUE FOR CPX OR MED CHECK    Next Appt: NONE        Lab Results   Component Value Date    A1C 6.2 11/10/2022    A1C 11.9 12/13/2021    A1C 11.7 12/09/2021    A1C 7.6 09/03/2021    A1C 6.8 12/14/2020

## 2023-10-23 RX ORDER — METFORMIN HCL 500 MG
TABLET, EXTENDED RELEASE 24 HR ORAL
Qty: 60 TABLET | Refills: 0 | Status: SHIPPED | OUTPATIENT
Start: 2023-10-23 | End: 2023-12-29

## 2023-12-27 DIAGNOSIS — E11.9 TYPE 2 DIABETES MELLITUS WITHOUT COMPLICATION, WITHOUT LONG-TERM CURRENT USE OF INSULIN (H): ICD-10-CM

## 2023-12-27 NOTE — TELEPHONE ENCOUNTER
Med:  metFORMIN (GLUCOPHAGE XR) 500 MG 24 hr tablet     LOV (related): 3/13/23    Last Lab: 11/10/22      Due for F/U around: overdue    Next Appt: none-patient agreed to set up diabetic check but declines a cpx        Lab Results   Component Value Date    A1C 6.2 11/10/2022    A1C 11.9 12/13/2021    A1C 11.7 12/09/2021    A1C 7.6 09/03/2021    A1C 6.8 12/14/2020

## 2023-12-29 RX ORDER — METFORMIN HCL 500 MG
TABLET, EXTENDED RELEASE 24 HR ORAL
Qty: 60 TABLET | Refills: 0 | Status: SHIPPED | OUTPATIENT
Start: 2023-12-29 | End: 2024-02-24

## 2024-01-04 ENCOUNTER — OFFICE VISIT (OUTPATIENT)
Dept: FAMILY MEDICINE | Facility: CLINIC | Age: 64
End: 2024-01-04

## 2024-01-04 VITALS
DIASTOLIC BLOOD PRESSURE: 84 MMHG | WEIGHT: 293 LBS | SYSTOLIC BLOOD PRESSURE: 180 MMHG | BODY MASS INDEX: 62.69 KG/M2 | HEART RATE: 73 BPM | OXYGEN SATURATION: 96 %

## 2024-01-04 DIAGNOSIS — I47.10 PAROXYSMAL SUPRAVENTRICULAR TACHYCARDIA (H): ICD-10-CM

## 2024-01-04 DIAGNOSIS — Z23 NEED FOR VACCINATION: ICD-10-CM

## 2024-01-04 DIAGNOSIS — E66.01 MORBID OBESITY (H): ICD-10-CM

## 2024-01-04 DIAGNOSIS — F33.0 MAJOR DEPRESSIVE DISORDER, RECURRENT EPISODE, MILD (H): ICD-10-CM

## 2024-01-04 DIAGNOSIS — E11.9 TYPE 2 DIABETES MELLITUS WITHOUT COMPLICATION, WITHOUT LONG-TERM CURRENT USE OF INSULIN (H): Primary | ICD-10-CM

## 2024-01-04 DIAGNOSIS — F33.2 SEVERE EPISODE OF RECURRENT MAJOR DEPRESSIVE DISORDER, WITHOUT PSYCHOTIC FEATURES (H): ICD-10-CM

## 2024-01-04 DIAGNOSIS — E11.65 TYPE 2 DIABETES MELLITUS WITH HYPERGLYCEMIA, WITHOUT LONG-TERM CURRENT USE OF INSULIN (H): ICD-10-CM

## 2024-01-04 LAB
ANION GAP SERPL CALCULATED.3IONS-SCNC: 8 MMOL/L (ref 7–15)
BUN SERPL-MCNC: 23.7 MG/DL (ref 8–23)
CALCIUM SERPL-MCNC: 9.1 MG/DL (ref 8.8–10.2)
CHLORIDE SERPL-SCNC: 104 MMOL/L (ref 98–107)
CHOLESTEROL: 120 MG/DL (ref 100–199)
CREAT SERPL-MCNC: 0.84 MG/DL (ref 0.51–0.95)
DEPRECATED HCO3 PLAS-SCNC: 26 MMOL/L (ref 22–29)
EGFRCR SERPLBLD CKD-EPI 2021: 78 ML/MIN/1.73M2
FASTING?: YES
GLUCOSE SERPL-MCNC: 139 MG/DL (ref 70–99)
HBA1C MFR BLD: 6.1 % (ref 4–6)
HDL (RMG): 43 MG/DL (ref 40–?)
LDL CALCULATED (RMG): 62 MG/DL (ref 0–130)
POTASSIUM SERPL-SCNC: 4.3 MMOL/L (ref 3.4–5.3)
SODIUM SERPL-SCNC: 138 MMOL/L (ref 135–145)
TRIGLYCERIDES (RMG): 77 MG/DL (ref 0–149)

## 2024-01-04 PROCEDURE — 99214 OFFICE O/P EST MOD 30 MIN: CPT | Mod: 25 | Performed by: FAMILY MEDICINE

## 2024-01-04 PROCEDURE — 83036 HEMOGLOBIN GLYCOSYLATED A1C: CPT | Performed by: FAMILY MEDICINE

## 2024-01-04 PROCEDURE — 90674 CCIIV4 VAC NO PRSV 0.5 ML IM: CPT | Performed by: FAMILY MEDICINE

## 2024-01-04 PROCEDURE — 90471 IMMUNIZATION ADMIN: CPT | Mod: 59 | Performed by: FAMILY MEDICINE

## 2024-01-04 PROCEDURE — 99207 PR FOOT EXAM NO CHARGE: CPT | Performed by: FAMILY MEDICINE

## 2024-01-04 PROCEDURE — 36415 COLL VENOUS BLD VENIPUNCTURE: CPT | Performed by: FAMILY MEDICINE

## 2024-01-04 PROCEDURE — 80048 BASIC METABOLIC PNL TOTAL CA: CPT | Mod: ORL | Performed by: FAMILY MEDICINE

## 2024-01-04 PROCEDURE — 80061 LIPID PANEL: CPT | Mod: QW | Performed by: FAMILY MEDICINE

## 2024-01-04 RX ORDER — VENLAFAXINE HYDROCHLORIDE 75 MG/1
75 TABLET, EXTENDED RELEASE ORAL DAILY
Qty: 90 TABLET | Refills: 1 | Status: SHIPPED | OUTPATIENT
Start: 2024-01-04 | End: 2024-02-19

## 2024-01-04 ASSESSMENT — ANXIETY QUESTIONNAIRES
IF YOU CHECKED OFF ANY PROBLEMS ON THIS QUESTIONNAIRE, HOW DIFFICULT HAVE THESE PROBLEMS MADE IT FOR YOU TO DO YOUR WORK, TAKE CARE OF THINGS AT HOME, OR GET ALONG WITH OTHER PEOPLE: EXTREMELY DIFFICULT
7. FEELING AFRAID AS IF SOMETHING AWFUL MIGHT HAPPEN: SEVERAL DAYS
2. NOT BEING ABLE TO STOP OR CONTROL WORRYING: MORE THAN HALF THE DAYS
6. BECOMING EASILY ANNOYED OR IRRITABLE: SEVERAL DAYS
GAD7 TOTAL SCORE: 13
1. FEELING NERVOUS, ANXIOUS, OR ON EDGE: NEARLY EVERY DAY
1. FEELING NERVOUS, ANXIOUS, OR ON EDGE: NEARLY EVERY DAY
5. BEING SO RESTLESS THAT IT IS HARD TO SIT STILL: MORE THAN HALF THE DAYS
3. WORRYING TOO MUCH ABOUT DIFFERENT THINGS: SEVERAL DAYS
2. NOT BEING ABLE TO STOP OR CONTROL WORRYING: MORE THAN HALF THE DAYS
6. BECOMING EASILY ANNOYED OR IRRITABLE: SEVERAL DAYS
GAD7 TOTAL SCORE: 13
5. BEING SO RESTLESS THAT IT IS HARD TO SIT STILL: MORE THAN HALF THE DAYS
3. WORRYING TOO MUCH ABOUT DIFFERENT THINGS: SEVERAL DAYS
GAD7 TOTAL SCORE: 13
7. FEELING AFRAID AS IF SOMETHING AWFUL MIGHT HAPPEN: SEVERAL DAYS

## 2024-01-04 ASSESSMENT — PATIENT HEALTH QUESTIONNAIRE - PHQ9
SUM OF ALL RESPONSES TO PHQ QUESTIONS 1-9: 19
5. POOR APPETITE OR OVEREATING: NEARLY EVERY DAY
5. POOR APPETITE OR OVEREATING: NEARLY EVERY DAY

## 2024-01-04 NOTE — PROGRESS NOTES
"Problem(s) Oriented visit        SUBJECTIVE:                                                    Donna Elmore is a 63 year old female who presents to clinic today for the following health issues :          1. Type 2 diabetes mellitus without complication, without long-term current use of insulin (H)  Diabetes  she  reports no new symptoms.  Tolerating these current diabetic meds:  Medications: yes:     Diabetes Medication(s)       Biguanides       metFORMIN (GLUCOPHAGE XR) 500 MG 24 hr tablet TAKE 1 TABLET(500 MG) BY MOUTH DAILY WITH DINNER       Incretin Mimetic Agents       Semaglutide, 1 MG/DOSE, (OZEMPIC, 1 MG/DOSE,) 4 MG/3ML pen Inject 1 mg Subcutaneous once a week     Semaglutide, 2 MG/DOSE, (OZEMPIC) 8 MG/3ML pen Inject 2 mg Subcutaneous every 7 days     semaglutide (OZEMPIC, 0.25 OR 0.5 MG/DOSE,) 2 MG/1.5ML SOPN pen (Discontinued) Inject 0.5 mg Subcutaneous every 7 days            No significnat or regular episodes of hypo or hyperglycemia  Medication compliance: compliant most of the time  Diabetic diet compliance: compliant most of the time  Diabetic ROS: no polyuria or polydipsia, no chest pain, dyspnea or TIA's, no numbness, tingling or pain in extremities    Home blood sugar monitoring: are performed regularly, Review of patients self blood glucose monitoring shows fasting most always < 130 and post prandial average under 170.  As a direct cause of their history of diabetes they have the following Diabetic complications: none    Most  recent A1C: Smoking: BP:   The last 5 available A1C values from [facility]:  No components found for: \"[id number]\"     Lab Results   Component Value Date    A1C 6.1 01/04/2024    A1C 6.2 11/10/2022    A1C 11.9 12/13/2021    History   Smoking Status    Never   Smokeless Tobacco    Never    BP Readings from Last 1 Encounters:   01/04/24 (!) 180/84        Kidney studies:  Creatinine   Date Value Ref Range Status   11/10/2022 0.74 0.57 - 1.00 mg/dL Final " "  12/09/2021 0.98 0.57 - 1.00 mg/dL Final   09/03/2021 0.72 0.57 - 1.00 mg/dL Final   ]    GFR Estimate   Date Value Ref Range Status   08/13/2020 76 >60 mL/min/[1.73_m2] Final     Comment:     Non  GFR Calc  Starting 12/18/2018, serum creatinine based estimated GFR (eGFR) will be   calculated using the Chronic Kidney Disease Epidemiology Collaboration   (CKD-EPI) equation.                  No components found for: \"MICORALBUMINLC\"      GFR Estimate If Black   Date Value Ref Range Status   08/13/2020 88 >60 mL/min/[1.73_m2] Final     Comment:      GFR Calc  Starting 12/18/2018, serum creatinine based estimated GFR (eGFR) will be   calculated using the Chronic Kidney Disease Epidemiology Collaboration   (CKD-EPI) equation.                        2. Severe episode of recurrent major depressive disorder, without psychotic features (H)  Depression:  Has known history of depression.  Has been on medication for this.  The patient does not report any significant side effects of this medication.  The prior symptoms leading to the original diagnosis and decision to start medication therapy are not yet optimal.     Appetite is stable.  Sleeping patterns are stable.  No reported thoughts of suicide or homicide.  Last PHQ-9 score on record=       3/19/2018     3:49 PM 8/27/2018    10:14 AM 5/24/2019     1:36 PM 7/15/2020    10:33 AM 12/28/2020     3:55 PM 2/1/2022     4:44 PM   PHQ-9 SCORE   PHQ-9 Total Score 14 10 0 16 15 1         3. Morbid obesity (H)  BMI is high    4. Paroxysmal supraventricular tachycardia  No recent palpitiaion    5. Need for vaccination    - VACCINE ADMINISTRATION, INITIAL  - INFLUENZA,INJ,MDCK,PF,QUAD >6MO(FLUCELVAX)       Problem list, Medication list, Allergies, and Medical/Social/Surgical histories reviewed in [facility] and updated as appropriate.   Additional history: as documented    ROS:  General and Resp. completed and negative except as noted above    Histories: "   Patient Active Problem List   Diagnosis    Paroxysmal supraventricular tachycardia    Major depressive disorder, recurrent episode, mild (H24)    Osteoarthritis of left knee    HTN (hypertension)    Morbid obesity (H)    Type 2 diabetes mellitus with hyperglycemia, without long-term current use of insulin (H)    H/O: hysterectomy    Severe episode of recurrent major depressive disorder, without psychotic features (H)     Past Surgical History:   Procedure Laterality Date    ------------OTHER-------------  7-2011    cyst removal - back    CATHETER, ABLATION  1-2010    tachycardia ablation    D & C  3-2010    HYSTERECTOMY RADICAL  3-2010    LAPAROSCOPIC APPENDECTOMY  9/27/2013    Procedure: LAPAROSCOPIC APPENDECTOMY;  LAPAROSCOPIC APPENDECTOMY;  Surgeon: Noreen Brambila MD;  Location:  OR       Social History     Tobacco Use    Smoking status: Never    Smokeless tobacco: Never   Substance Use Topics    Alcohol use: No     Family History   Problem Relation Age of Onset    Heart Disease Mother         pacemaker, heart failure    Thyroid Disease Mother     Hypertension Mother     Cancer Father         kidney    Heart Disease Father     Hypertension Father     Thyroid Disease Sister            OBJECTIVE:                                                    BP (!) 180/84   Pulse 73   Wt (!) 176.2 kg (388 lb 6.4 oz)   SpO2 96%   BMI 62.69 kg/m    Body mass index is 62.69 kg/m .   APPEARANCE: = alert and no distress  Ear canals and TM's = Canals are not inflammed and have none or little wax and the drums are not injected and have a light reflex   Breath Sounds = Good air movement with no rales or rhonchi in any lung fields  Foot exam = Monofilament exam is with appropriate sensation  SKIN = absent significant rashes or lesions   Recent/Remote Memory = Alert and Oriented x 3     ASSESSMENT/PLAN:                                                        Donna was seen today for diabetes and health maintenance.    Diagnoses  and all orders for this visit:    Type 2 diabetes mellitus without complication, without long-term current use of insulin (H)  Tolerating current meds as listed on med list  We managed prescriptions with refills to ensure appropriate availability of current medications.  Next follow up will be in 3 months.  Eye exam status confirmed and ordered if needed.  Reviewed importance in compliance in all areas of diabetic control including diet, routine BS checks,  medication compliance, laboratory monitoring, and attending regular follow up appointments as ordered.  Reviewed goal of preventing complications from such as CAD, CVA, PVD, and retinopathy/neuropathy/nephropathy.    -     Basic metabolic panel; Future  -     Hemoglobin A1C ([facility])  -     DC FOOT EXAM NO CHARGE  -     Microalbumin ([facility])  -     Lipid Profile ([facility])  -     Semaglutide, 2 MG/DOSE, (OZEMPIC) 8 MG/3ML pen; Inject 2 mg Subcutaneous every 7 days    Major depressive disorder, recurrent episode, mild (H24)  Spoke at length about mood disorders including suspected pathophysiology, role of neurotransmitters, and treatment options including medication options ( especially SSRIs that treat cause of sx) and counselling.  Tolerating current meds. We discussed ongoing management of her  medications and how we will refill the medications now and in the future.    -     venlafaxine (EFFEXOR-ER) 75 MG 24 hr tablet; Take 1 tablet (75 mg) by mouth daily    Morbid obesity (H)  Increase the ozempic to 2mg  Type 2 diabetes mellitus with hyperglycemia, without long-term current use of insulin (H)    Paroxysmal supraventricular tachycardia  Stable on current meds.  Need for vaccination  -     VACCINE ADMINISTRATION, INITIAL  -     INFLUENZA,INJ,MDCK,PF,QUAD >6MO(FLUCELVAX)        Work on weight loss  There are no Patient Instructions on file for this visit.    The following health maintenance items are reviewed in Epic and correct as of today:  Health Maintenance   Topic  Date Due    YEARLY PREVENTIVE VISIT  Never done    ADVANCE CARE PLANNING  Never done    COLORECTAL CANCER SCREENING  Never done    MAMMO SCREENING  03/28/2013    ZOSTER IMMUNIZATION (2 of 2) 10/22/2018    RSV VACCINE (Pregnancy & 60+) (1 - 1-dose 60+ series) Never done    MICROALBUMIN  12/14/2021    PHQ-9  08/01/2022    INFLUENZA VACCINE (1) 09/01/2023    COVID-19 Vaccine (6 - 2023-24 season) 09/29/2023    EYE EXAM  10/25/2023    BMP  11/10/2023    LIPID  11/10/2023    A1C  07/04/2024    DIABETIC FOOT EXAM  01/04/2025    DTAP/TDAP/TD IMMUNIZATION (3 - Td or Tdap) 08/27/2028    HEPATITIS C SCREENING  Completed    DEPRESSION ACTION PLAN  Completed    Pneumococcal Vaccine: Pediatrics (0 to 5 Years) and At-Risk Patients (6 to 64 Years)  Completed    IPV IMMUNIZATION  Aged Out    HPV IMMUNIZATION  Aged Out    MENINGITIS IMMUNIZATION  Aged Out    RSV MONOCLONAL ANTIBODY  Aged Out    HIV SCREENING  Discontinued    PAP  Discontinued       Dennis Narayanan MD  [facility]  Family Practice  [facility]  [phone]    For any issues my office # is [phone]

## 2024-01-05 ENCOUNTER — MYC MEDICAL ADVICE (OUTPATIENT)
Dept: FAMILY MEDICINE | Facility: CLINIC | Age: 64
End: 2024-01-05

## 2024-01-05 DIAGNOSIS — B37.31 YEAST INFECTION OF THE VAGINA: Primary | ICD-10-CM

## 2024-01-05 RX ORDER — FLUCONAZOLE 150 MG/1
TABLET ORAL
Qty: 2 TABLET | Refills: 0 | Status: SHIPPED | OUTPATIENT
Start: 2024-01-05 | End: 2024-04-09

## 2024-01-08 NOTE — RESULT ENCOUNTER NOTE
Dear Donna,     I am writing to report that your included test results are as expected.    Many labs contain some results that are slightly outside of the normal range, I have reviewed any of these results and they require no changes at this time.    Dennsi Narayanan MD

## 2024-01-10 ENCOUNTER — MYC REFILL (OUTPATIENT)
Dept: FAMILY MEDICINE | Facility: CLINIC | Age: 64
End: 2024-01-10

## 2024-01-10 DIAGNOSIS — E11.9 TYPE 2 DIABETES MELLITUS WITHOUT COMPLICATION, WITHOUT LONG-TERM CURRENT USE OF INSULIN (H): ICD-10-CM

## 2024-01-20 ENCOUNTER — HEALTH MAINTENANCE LETTER (OUTPATIENT)
Age: 64
End: 2024-01-20

## 2024-02-19 ENCOUNTER — TRANSFERRED RECORDS (OUTPATIENT)
Dept: FAMILY MEDICINE | Facility: CLINIC | Age: 64
End: 2024-02-19

## 2024-02-19 ENCOUNTER — OFFICE VISIT (OUTPATIENT)
Dept: FAMILY MEDICINE | Facility: CLINIC | Age: 64
End: 2024-02-19

## 2024-02-19 VITALS
BODY MASS INDEX: 61.95 KG/M2 | SYSTOLIC BLOOD PRESSURE: 181 MMHG | HEART RATE: 68 BPM | WEIGHT: 293 LBS | OXYGEN SATURATION: 98 % | DIASTOLIC BLOOD PRESSURE: 81 MMHG

## 2024-02-19 DIAGNOSIS — E66.01 MORBID OBESITY (H): ICD-10-CM

## 2024-02-19 DIAGNOSIS — E11.65 TYPE 2 DIABETES MELLITUS WITH HYPERGLYCEMIA, WITHOUT LONG-TERM CURRENT USE OF INSULIN (H): Primary | ICD-10-CM

## 2024-02-19 LAB — RETINOPATHY: NEGATIVE

## 2024-02-19 PROCEDURE — 99213 OFFICE O/P EST LOW 20 MIN: CPT | Performed by: FAMILY MEDICINE

## 2024-02-19 PROCEDURE — 92228 IMG RTA DETC/MNTR DS PHY/QHP: CPT | Performed by: FAMILY MEDICINE

## 2024-02-19 NOTE — PROGRESS NOTES
"Here to follow up on the Ozempic use  Now up to 2 mg daily  Tolerating the food intake.  Chose not to start the effexor  Feeling better  Enjoying her work.    Problem(s) Oriented visit      ROS:  General and Resp. completed and negative except as noted above     HISTORY:   reports no history of alcohol use.   reports that she has never smoked. She has never used smokeless tobacco.    See chart for additional current history and problem list    EXAM:  BP: 181/81[wrist BP[   Pulse: 68    Temp: Data Unavailable    Wt Readings from Last 2 Encounters:   02/19/24 (!) 174.1 kg (383 lb 12.8 oz)   01/04/24 (!) 176.2 kg (388 lb 6.4 oz)       BMI= Body mass index is 61.95 kg/m .    EXAM:  APPEARANCE: = Relaxed and in no distress  Oropharynx = No leukoplakia, No injection to the tissues, Normal Uvula  Resp effort = Calm regular breathing      Assessment/Plan:  Donna was seen today for recheck.    Diagnoses and all orders for this visit:    Type 2 diabetes mellitus with hyperglycemia, without long-term current use of insulin (H)    Morbid obesity (H)    Continue Ozempic  Knees hurt  Hope for weight loss to greatly help    COUNSELING:   reports that she has never smoked. She has never used smokeless tobacco.    Estimated body mass index is 61.95 kg/m  as calculated from the following:    Height as of 11/8/22: 1.676 m (5' 6\").    Weight as of this encounter: 174.1 kg (383 lb 12.8 oz).   Weight management plan:      Appropriate preventive services were discussed with this patient, including applicable screening as appropriate for cardiovascular disease, diabetes, osteopenia/osteoporosis, and glaucoma.  As appropriate for age/gender, discussed screening for colorectal cancer, prostate cancer, breast cancer, and cervical cancer. Checklist reviewing preventive services available has been given to the patient.    Reviewed patients plan of care and provided an AVS. The Basic Care Plan (routine screening as documented in Health " Maintenance) for Donna meets the Care Plan requirement. This Care Plan has been established and reviewed with the  Patient.      The following health maintenance items are reviewed in Epic and correct as of today:  Health Maintenance   Topic Date Due    YEARLY PREVENTIVE VISIT  Never done    ADVANCE CARE PLANNING  Never done    COLORECTAL CANCER SCREENING  Never done    MAMMO SCREENING  03/28/2013    ZOSTER IMMUNIZATION (2 of 2) 10/22/2018    RSV VACCINE (Pregnancy & 60+) (1 - 1-dose 60+ series) Never done    MICROALBUMIN  12/14/2021    COVID-19 Vaccine (6 - 2023-24 season) 09/29/2023    EYE EXAM  10/25/2023    A1C  07/04/2024    PHQ-9  07/04/2024    BMP  01/04/2025    LIPID  01/04/2025    DIABETIC FOOT EXAM  01/04/2025    DTAP/TDAP/TD IMMUNIZATION (3 - Td or Tdap) 08/27/2028    HEPATITIS C SCREENING  Completed    DEPRESSION ACTION PLAN  Completed    INFLUENZA VACCINE  Completed    Pneumococcal Vaccine: Pediatrics (0 to 5 Years) and At-Risk Patients (6 to 64 Years)  Completed    IPV IMMUNIZATION  Aged Out    HPV IMMUNIZATION  Aged Out    MENINGITIS IMMUNIZATION  Aged Out    RSV MONOCLONAL ANTIBODY  Aged Out    HIV SCREENING  Discontinued    PAP  Discontinued       Dennis Narayanan  [facility]  For any issues my office # is [phone]

## 2024-02-22 ENCOUNTER — MYC MEDICAL ADVICE (OUTPATIENT)
Dept: FAMILY MEDICINE | Facility: CLINIC | Age: 64
End: 2024-02-22

## 2024-04-09 ENCOUNTER — VIRTUAL VISIT (OUTPATIENT)
Dept: PHARMACY | Facility: PHYSICIAN GROUP | Age: 64
End: 2024-04-09
Payer: COMMERCIAL

## 2024-04-09 DIAGNOSIS — E11.9 TYPE 2 DIABETES MELLITUS WITHOUT COMPLICATION, WITHOUT LONG-TERM CURRENT USE OF INSULIN (H): ICD-10-CM

## 2024-04-09 DIAGNOSIS — Z78.9 TAKES DIETARY SUPPLEMENTS: ICD-10-CM

## 2024-04-09 DIAGNOSIS — I10 PRIMARY HYPERTENSION: Primary | ICD-10-CM

## 2024-04-09 PROCEDURE — 99605 MTMS BY PHARM NP 15 MIN: CPT | Mod: 93 | Performed by: PHARMACIST

## 2024-04-09 RX ORDER — LOSARTAN POTASSIUM 50 MG/1
50 TABLET ORAL DAILY
Qty: 90 TABLET | Refills: 0 | Status: SHIPPED | OUTPATIENT
Start: 2024-04-09

## 2024-04-09 NOTE — PROGRESS NOTES
Medication Therapy Management (MTM) Encounter    ASSESSMENT:                            Medication Adherence/Access: See below for considerations    Diabetes:   Patient is meeting A1c goal of < 7%.  Patient is meeting goal of > 70% time in target with continuous glucose monitoring.   Patient would benefit from using two- 1mg shots next week and we will work to see if we can get the coverage for Ozempic 2mg to continue her therapy.     Hypertension:   Patient is not meeting blood pressure goal of < 140/90mmHg. Patient would benefit from starting ARB.    Supplements:   Stable.    PLAN:                            Check blood work and blood pressure on Monday in 1 month, then set up phone call visit with MTM on Tues/Thurs.    Start Losartan 50mg daily and start checking home blood pressures once per day.     Can take two of the 1mg Ozempic shots next week while we wait for insurance to process the 2mg.       Follow-up: Return in about 4 weeks (around 5/7/2024) for Lab Work, Phone call with MTM.    SUBJECTIVE/OBJECTIVE:                          Donna Elmore is a 64 year old female called for a follow-up visit.       Reason for visit: medication review.    Allergies/ADRs: Reviewed in chart  Past Medical History: Reviewed in chart  Tobacco: He reports that he has never smoked. He has never used smokeless tobacco.  Alcohol: not currently using    Medication Adherence/Access: see Ozempic below- had been on it but insurance then was not covering it and was waiting for formulary to update to add Ozempic, used last dose yesterday, but has 2- 1mg shots remaining at home.     Diabetes   Ozempic 2mg weekly - only nausea if going long time without eating or if takes too late at night. Also will get nosebleeds if taking later in the evening. Last shot 4/8.   No longer on metformin due to control of sugars and goal to limit medications.   Blood Sugar Ranges (patient reported): see below    Current diabetes symptoms: none      Hypertension:   Metoprolol Succinate 100mg daily  Patient reports no current medication side effects.  Patient does not self-monitor blood pressure, but has been high at chiropractor as well.   BP Readings from Last 3 Encounters:   02/19/24 (!) 181/81   01/04/24 (!) 180/84   11/10/22 136/86     Pulse Readings from Last 3 Encounters:   02/19/24 68   01/04/24 73   11/08/22 72     Supplements:   zinc   Elderberry  Probiotic  magnesium   vitamin D  No reported issues at this time.       Today's Vitals: There were no vitals taken for this visit.  ----------------    I spent 11 minutes with this patient today. All changes were made via collaborative practice agreement with Dennis Narayanan MD. A copy of the visit note was provided to the patient's provider(s).    A summary of these recommendations was sent via [employer].    Dorothy Wylie, Pharm.D, [facility]  Medication Therapy Management Pharmacist  [phone]    Telemedicine Visit Details  Type of service:  Telephone visit  Start Time: 8:34 AM  End Time:  8:45 AM     Medication Therapy Recommendations  HTN (hypertension)    Current Medication: losartan (COZAAR) 50 MG tablet   Rationale: Medication requires monitoring - Needs additional monitoring - Effectiveness   Recommendation: Self-Monitoring - HOME MONITORING DAILY   Status: Patient Agreed - Adherence/Education          Current Medication: metoprolol succinate ER (TOPROL XL) 100 MG 24 hr tablet   Rationale: Synergistic therapy - Needs additional medication therapy - Indication   Recommendation: Start Medication - losartan 50 MG tablet - 1 DAILY   Status: Accepted per CPA         Type 2 diabetes mellitus with hyperglycemia, without long-term current use of insulin (H)    Current Medication: Semaglutide, 2 MG/DOSE, (OZEMPIC) 8 MG/3ML pen   Rationale: Medication product not available - Adherence - Adherence   Recommendation: Provide Education - okay to use 2- 1mg shots this next week   Status: Accepted per CPA

## 2024-04-09 NOTE — PATIENT INSTRUCTIONS
"Recommendations from today's MTM visit:                                                       Check blood work and blood pressure on Monday in 1 month, then set up phone call visit with MTM on Tues/Thurs.    Start Losartan 50mg daily and start checking home blood pressures once per day.     Can take two of the 1mg Ozempic shots next week while we wait for insurance to process the 2mg.     Follow-up: Return in about 4 weeks (around 5/7/2024) for Lab Work, Phone call with MTM.    It was great speaking with you today.  I value your experience and would be very thankful for your time in providing feedback in our clinic survey. In the next few days, you may receive an email or text message from [facility] [employer] with a link to a survey related to your  clinical pharmacist.\"     My Clinical Pharmacist's contact information:                                                      Please feel free to contact me with any questions or concerns you have.      Dorothy Wylie, Pharm.D, [facility]  Medication Therapy Management Pharmacist  [phone]      "

## 2024-04-16 ENCOUNTER — MYC MEDICAL ADVICE (OUTPATIENT)
Dept: FAMILY MEDICINE | Facility: CLINIC | Age: 64
End: 2024-04-16

## 2024-04-19 NOTE — TELEPHONE ENCOUNTER
Prior authorization done via CoverMyMeds. Prior authorization approved with approval dates 1/14/24-4/13/25. Patient aware of approval. Laura Medina

## 2024-06-08 ENCOUNTER — HEALTH MAINTENANCE LETTER (OUTPATIENT)
Age: 64
End: 2024-06-08

## 2024-07-30 DIAGNOSIS — I10 ESSENTIAL HYPERTENSION: ICD-10-CM

## 2024-07-30 RX ORDER — METOPROLOL SUCCINATE 100 MG/1
TABLET, EXTENDED RELEASE ORAL
Qty: 90 TABLET | Refills: 0 | Status: SHIPPED | OUTPATIENT
Start: 2024-07-30

## 2024-07-30 NOTE — TELEPHONE ENCOUNTER
Med: Metoprolol Succinate     LOV (related): 2/1/22    Last Lab: 1/4/24      Due for F/U around: OVERDUE has been over 2 years since last BP check     Next Appt: None        BP Readings from Last 3 Encounters:   02/19/24 (!) 181/81   01/04/24 (!) 180/84   11/10/22 136/86       Last Comprehensive Metabolic Panel:  Lab Results   Component Value Date     01/04/2024    POTASSIUM 4.3 01/04/2024    CHLORIDE 104 01/04/2024    CO2 26 01/04/2024    ANIONGAP 8 01/04/2024     (H) 01/04/2024    BUN 23.7 (H) 01/04/2024    CR 0.84 01/04/2024    GFRESTIMATED 78 01/04/2024    ROBYN 9.1 01/04/2024

## 2024-08-17 ENCOUNTER — HEALTH MAINTENANCE LETTER (OUTPATIENT)
Age: 64
End: 2024-08-17

## 2024-10-30 ENCOUNTER — ORDERS ONLY (AUTO-RELEASED) (OUTPATIENT)
Dept: FAMILY MEDICINE | Facility: CLINIC | Age: 64
End: 2024-10-30

## 2024-10-30 ENCOUNTER — OFFICE VISIT (OUTPATIENT)
Dept: FAMILY MEDICINE | Facility: CLINIC | Age: 64
End: 2024-10-30

## 2024-10-30 VITALS
WEIGHT: 293 LBS | OXYGEN SATURATION: 98 % | SYSTOLIC BLOOD PRESSURE: 118 MMHG | BODY MASS INDEX: 63.11 KG/M2 | HEART RATE: 73 BPM | DIASTOLIC BLOOD PRESSURE: 78 MMHG

## 2024-10-30 DIAGNOSIS — F33.2 SEVERE EPISODE OF RECURRENT MAJOR DEPRESSIVE DISORDER, WITHOUT PSYCHOTIC FEATURES (H): ICD-10-CM

## 2024-10-30 DIAGNOSIS — E66.01 MORBID OBESITY (H): ICD-10-CM

## 2024-10-30 DIAGNOSIS — I10 PRIMARY HYPERTENSION: Primary | ICD-10-CM

## 2024-10-30 DIAGNOSIS — Z12.11 SCREENING FOR COLON CANCER: ICD-10-CM

## 2024-10-30 DIAGNOSIS — Z12.31 ENCOUNTER FOR SCREENING MAMMOGRAM FOR MALIGNANT NEOPLASM OF BREAST: ICD-10-CM

## 2024-10-30 DIAGNOSIS — Z23 NEED FOR VACCINATION: ICD-10-CM

## 2024-10-30 DIAGNOSIS — I10 ESSENTIAL HYPERTENSION: ICD-10-CM

## 2024-10-30 DIAGNOSIS — E11.9 TYPE 2 DIABETES MELLITUS WITHOUT COMPLICATION, WITHOUT LONG-TERM CURRENT USE OF INSULIN (H): ICD-10-CM

## 2024-10-30 LAB
A/C RATIO (RMG): <30
ALBUMIN- RMG: 80 (ref 0–10)
INTERPRETATION: NORMAL
URINE CREATININE - RMG: 300 (ref 0–300)

## 2024-10-30 PROCEDURE — 90750 HZV VACC RECOMBINANT IM: CPT | Performed by: FAMILY MEDICINE

## 2024-10-30 PROCEDURE — 82044 UR ALBUMIN SEMIQUANTITATIVE: CPT | Performed by: FAMILY MEDICINE

## 2024-10-30 PROCEDURE — 36415 COLL VENOUS BLD VENIPUNCTURE: CPT | Performed by: FAMILY MEDICINE

## 2024-10-30 PROCEDURE — 90471 IMMUNIZATION ADMIN: CPT | Performed by: FAMILY MEDICINE

## 2024-10-30 PROCEDURE — 99214 OFFICE O/P EST MOD 30 MIN: CPT | Mod: 25 | Performed by: FAMILY MEDICINE

## 2024-10-30 RX ORDER — METOPROLOL SUCCINATE 100 MG/1
100 TABLET, EXTENDED RELEASE ORAL DAILY
Qty: 90 TABLET | Refills: 0 | Status: SHIPPED | OUTPATIENT
Start: 2024-10-30

## 2024-10-30 NOTE — PROGRESS NOTES
Problem(s) Oriented visit      Subjective:  CC: Recheck Medication (Nonfasting/)        1. Type 2 diabetes mellitus without complication, without long-term current use of insulin (H)  Here for check up    2. Primary hypertension (Primary)  Has not started cozaar and at goal today    3. Morbid obesity (H)  Lot sof side effects from 2 mg  - Semaglutide, 1 MG/DOSE, (OZEMPIC) 4 MG/3ML pen; Inject 1 mg subcutaneously every 7 days.  Dispense: 3 mL; Refill: 0    4. Severe episode of recurrent major depressive disorder, without psychotic features (H)  Much improved.  - VENOUS COLLECTION    5. Need for vaccination    - VACCINE ADMINISTRATION, INITIAL    6. Screening for colon cancer    - Laser View([employer]); Future    7. Encounter for screening mammogram for malignant neoplasm of breast  - MA Screening Bilateral w/ Ta; Future       ROS:  General and Resp. completed and negative except as noted above     HISTORY:   reports no history of alcohol use.   reports that she has never smoked. She has never used smokeless tobacco.    Past Medical History:   Diagnosis Date    Abnormal glucose 2/1/17    Appendicitis 9/2015    Depression     Hepatitis A     HTN (hypertension)     Obese     Osteoarthritis of left knee     Plantar fasciitis     Shingles     SVT (supraventricular tachycardia) (H) 8/15/2012    Uterine cancer (H) 3/2010    Venous dermatitis      Past Surgical History:   Procedure Laterality Date    ------------OTHER-------------  7-2011    cyst removal - back    CATHETER, ABLATION  1-2010    tachycardia ablation    D & C  3-2010    HYSTERECTOMY RADICAL  3-2010    LAPAROSCOPIC APPENDECTOMY  9/27/2013    Procedure: LAPAROSCOPIC APPENDECTOMY;  LAPAROSCOPIC APPENDECTOMY;  Surgeon: Noreen Brambila MD;  Location:  OR       EXAM:  BP: 118/78   Pulse: 73    Temp: Data Unavailable    Wt Readings from Last 2 Encounters:   10/30/24 (!) 177.4 kg (391 lb)   02/19/24 (!) 174.1 kg (383 lb 12.8 oz)       BMI= Body mass index is  63.11 kg/m .    EXAM:  APPEARANCE: = Relaxed and in no distress  Resp effort = Calm regular breathing  Breath Sounds = Good air movement with no rales or rhonchi in any lung fields  Digits and Nails = FROM in all finger joints, no nail dystrophy      Assessment/Plan:  Donna was seen today for recheck medication.    Diagnoses and all orders for this visit:    Primary hypertension  At goal cpm   Refill metoprolol sent    Due for checkn on   Type 2 diabetes mellitus without complication, without long-term current use of insulin (H)  -     Microalbumin (RMG)  -     Hemoglobin A1c; Future  -     Semaglutide, 1 MG/DOSE, (OZEMPIC) 4 MG/3ML pen; Inject 1 mg subcutaneously every 7 days.    Morbid obesity (H)  Tolerating current meds as listed on med list  We managed prescriptions with refills to ensure appropriate availability of current medications.  Next follow up will be in 6 months.  Eye exam status confirmed and ordered if needed.  Reviewed importance in compliance in all areas of diabetic control including diet, routine BS checks,  medication compliance, laboratory monitoring, and attending regular follow up appointments as ordered.  Reviewed goal of preventing complications from such as CAD, CVA, PVD, and retinopathy/neuropathy/nephropathy.    -     Semaglutide, 1 MG/DOSE, (OZEMPIC) 4 MG/3ML pen; Inject 1 mg subcutaneously every 7 days.    Severe episode of recurrent major depressive disorder, without psychotic features (H)  Working through grief.  -     VENOUS COLLECTION    Need for vaccination  -     VACCINE ADMINISTRATION, INITIAL    Screening for colon cancer  -     COLOGUARD([employer]); Future    Encounter for screening mammogram for malignant neoplasm of breast  -     MA Screening Bilateral w/ Ta; Future    Other orders  -     ZOSTER RECOMBINANT ADJUVANTED (SHINGRIX)        COUNSELING:   reports that she has never smoked. She has never used smokeless tobacco.    Estimated body mass index is 63.11 kg/m  as  "calculated from the following:    Height as of 11/8/22: 1.676 m (5' 6\").    Weight as of this encounter: 177.4 kg (391 lb).   Weight management plan: change to 1mg     Appropriate preventive services were discussed with this patient, including applicable screening as appropriate for cardiovascular disease, diabetes, osteopenia/osteoporosis, and glaucoma.  As appropriate for age/gender, discussed screening for colorectal cancer, prostate cancer, breast cancer, and cervical cancer. Checklist reviewing preventive services available has been given to the patient.    Reviewed patients plan of care and provided an AVS. The Basic Care Plan (routine screening as documented in Health Maintenance) for Donna meets the Care Plan requirement. This Care Plan has been established and reviewed with the  Patient.      The following health maintenance items are reviewed in Epic and correct as of today:  Health Maintenance   Topic Date Due    YEARLY PREVENTIVE VISIT  Never done    ADVANCE CARE PLANNING  Never done    COLORECTAL CANCER SCREENING  Never done    MAMMO SCREENING  03/28/2013    RSV VACCINE (1 - Risk 60-74 years 1-dose series) Never done    MICROALBUMIN  12/14/2021    A1C  07/04/2024    PHQ-9  07/04/2024    BMP  01/04/2025    LIPID  01/04/2025    DIABETIC FOOT EXAM  01/04/2025    EYE EXAM  02/19/2026    DTAP/TDAP/TD IMMUNIZATION (3 - Td or Tdap) 08/27/2028    HEPATITIS C SCREENING  Completed    DEPRESSION ACTION PLAN  Completed    INFLUENZA VACCINE  Completed    Pneumococcal Vaccine: Pediatrics (0 to 5 Years) and At-Risk Patients (6 to 64 Years)  Completed    ZOSTER IMMUNIZATION  Completed    COVID-19 Vaccine  Completed    HPV IMMUNIZATION  Aged Out    MENINGITIS IMMUNIZATION  Aged Out    RSV MONOCLONAL ANTIBODY  Aged Out    HIV SCREENING  Discontinued    PAP  Discontinued       Dennis Narayanan MD  [facility]  [phone]       For any issues my office # is [phone]    The longitudinal " plan of care for the diagnosis(es)/condition(s) as documented were addressed during this visit. Due to the added complexity in care, I will continue to support Donna in the subsequent management and with ongoing continuity of care.

## 2024-10-31 ENCOUNTER — PATIENT OUTREACH (OUTPATIENT)
Dept: CARE COORDINATION | Facility: CLINIC | Age: 64
End: 2024-10-31
Payer: COMMERCIAL

## 2024-11-23 DIAGNOSIS — E11.9 TYPE 2 DIABETES MELLITUS WITHOUT COMPLICATION, WITHOUT LONG-TERM CURRENT USE OF INSULIN (H): ICD-10-CM

## 2024-11-23 DIAGNOSIS — E66.01 MORBID OBESITY (H): ICD-10-CM

## 2024-11-25 RX ORDER — SEMAGLUTIDE 1.34 MG/ML
1 INJECTION, SOLUTION SUBCUTANEOUS
Qty: 3 ML | Refills: 0 | Status: SHIPPED | OUTPATIENT
Start: 2024-11-25

## 2024-12-06 ENCOUNTER — MYC REFILL (OUTPATIENT)
Dept: FAMILY MEDICINE | Facility: CLINIC | Age: 64
End: 2024-12-06

## 2024-12-06 DIAGNOSIS — E66.01 MORBID OBESITY (H): ICD-10-CM

## 2024-12-06 DIAGNOSIS — E11.9 TYPE 2 DIABETES MELLITUS WITHOUT COMPLICATION, WITHOUT LONG-TERM CURRENT USE OF INSULIN (H): ICD-10-CM

## 2024-12-06 RX ORDER — SEMAGLUTIDE 1.34 MG/ML
1 INJECTION, SOLUTION SUBCUTANEOUS
Qty: 3 ML | Refills: 0 | Status: CANCELLED | OUTPATIENT
Start: 2024-12-06

## 2024-12-09 NOTE — TELEPHONE ENCOUNTER
Med: Ozempic    LOV (related): 10/30/24      Due for F/U around: 4/30/24    Next Appt: Not scheduled        Wt Readings from Last 2 Encounters:   10/30/24 (!) 177.4 kg (391 lb)   02/19/24 (!) 174.1 kg (383 lb 12.8 oz)       BMI Readings from Last 2 Encounters:   10/30/24 63.11 kg/m    02/19/24 61.95 kg/m

## 2024-12-10 RX ORDER — SEMAGLUTIDE 1.34 MG/ML
1 INJECTION, SOLUTION SUBCUTANEOUS
Qty: 3 ML | Refills: 2 | Status: SHIPPED | OUTPATIENT
Start: 2024-12-10

## 2025-01-11 ENCOUNTER — TELEPHONE (OUTPATIENT)
Dept: FAMILY MEDICINE | Facility: CLINIC | Age: 65
End: 2025-01-11

## 2025-01-11 DIAGNOSIS — R06.2 WHEEZING: Primary | ICD-10-CM

## 2025-01-11 DIAGNOSIS — J01.90 ACUTE SINUSITIS WITH SYMPTOMS > 10 DAYS: ICD-10-CM

## 2025-01-11 RX ORDER — ALBUTEROL SULFATE 0.83 MG/ML
2.5 SOLUTION RESPIRATORY (INHALATION) EVERY 6 HOURS PRN
Qty: 90 ML | Refills: 0 | Status: SHIPPED | OUTPATIENT
Start: 2025-01-11

## 2025-01-11 RX ORDER — ALBUTEROL SULFATE 0.83 MG/ML
2.5 SOLUTION RESPIRATORY (INHALATION) EVERY 6 HOURS PRN
Qty: 90 ML | Refills: 0 | Status: SHIPPED | OUTPATIENT
Start: 2025-01-11 | End: 2025-01-11

## 2025-01-13 NOTE — TELEPHONE ENCOUNTER
Length of video call : 14 min    Patient location:  Home    Provider location:  [facility]     Mode of Communication:  Phone    SUBJECTIVE:                                                 Donna Elmore is a 64 year old female seen via video visit for the following health issues :    Reports 10-12 days of cough, congestion, increasing sinus pressure and discomfort.  Also reports some chest congestion when she leans back.  Requests refill of nebulizer as this has helped in the past.  No formal diagnosis of asthma.  No fever, chills, chest pain      OBJECTIVE:                                                    No vitals taken due to telehealth visit       Resp effort = talking in full sentences. No audible  breathing.       Creatinine   Date Value Ref Range Status   01/04/2024 0.84 0.51 - 0.95 mg/dL Final   11/10/2022 0.74 0.57 - 1.00 mg/dL Final       ASSESSMENT/PLAN:                                                      Diagnoses and all orders for this visit:    Wheezing  -     Discontinue: albuterol (PROVENTIL) (2.5 MG/3ML) 0.083% neb solution; Take 1 vial (2.5 mg) by nebulization every 6 hours as needed for shortness of breath, wheezing or cough.  -     albuterol (PROVENTIL) (2.5 MG/3ML) 0.083% neb solution; Take 1 vial (2.5 mg) by nebulization every 6 hours as needed for shortness of breath, wheezing or cough.    Acute sinusitis with symptoms > 10 days  -     Discontinue: albuterol (PROVENTIL) (2.5 MG/3ML) 0.083% neb solution; Take 1 vial (2.5 mg) by nebulization every 6 hours as needed for shortness of breath, wheezing or cough.  -     amoxicillin-clavulanate (AUGMENTIN) 875-125 MG tablet; Take 1 tablet by mouth 2 times daily for 7 days.  -     albuterol (PROVENTIL) (2.5 MG/3ML) 0.083% neb solution; Take 1 vial (2.5 mg) by nebulization every 6 hours as needed for shortness of breath, wheezing or cough.      Discussed in detail.  Aware it would have been ideal to evaluate in the clinic.  No  red flag symptoms.  Given duration and severity of symptoms will treat with augmentin and refill neb.  If not improving advised follow up in clinic.    Patient is agreement with the assessment and plan as outlined above.  All questions answered.  Red flag symptoms that should prompt emergent evaluation discussed and understood.

## 2025-01-25 DIAGNOSIS — I10 ESSENTIAL HYPERTENSION: ICD-10-CM

## 2025-01-25 NOTE — CONFIDENTIAL NOTE
Med: METOPROLOL    LOV (related):10/30/24    Last Lab: 1/4/24      Due for F/U around: 4/2025 FOR MWM & DM    Next Appt: NONE        BP Readings from Last 3 Encounters:   10/30/24 118/78   02/19/24 (!) 181/81   01/04/24 (!) 180/84       Last Comprehensive Metabolic Panel:  Lab Results   Component Value Date     01/04/2024    POTASSIUM 4.3 01/04/2024    CHLORIDE 104 01/04/2024    CO2 26 01/04/2024    ANIONGAP 8 01/04/2024     (H) 01/04/2024    BUN 23.7 (H) 01/04/2024    CR 0.84 01/04/2024    GFRESTIMATED 78 01/04/2024    ROBYN 9.1 01/04/2024

## 2025-01-26 RX ORDER — METOPROLOL SUCCINATE 100 MG/1
100 TABLET, EXTENDED RELEASE ORAL DAILY
Qty: 90 TABLET | Refills: 0 | Status: SHIPPED | OUTPATIENT
Start: 2025-01-26

## 2025-02-03 DIAGNOSIS — I10 ESSENTIAL HYPERTENSION: ICD-10-CM

## 2025-02-03 RX ORDER — METOPROLOL SUCCINATE 100 MG/1
100 TABLET, EXTENDED RELEASE ORAL DAILY
Qty: 90 TABLET | Refills: 0 | OUTPATIENT
Start: 2025-02-03

## 2025-02-05 ENCOUNTER — MYC MEDICAL ADVICE (OUTPATIENT)
Dept: FAMILY MEDICINE | Facility: CLINIC | Age: 65
End: 2025-02-05

## 2025-02-05 ENCOUNTER — OFFICE VISIT (OUTPATIENT)
Dept: CARDIOLOGY | Facility: CLINIC | Age: 65
End: 2025-02-05
Payer: COMMERCIAL

## 2025-02-05 VITALS
WEIGHT: 293 LBS | HEART RATE: 81 BPM | HEIGHT: 66 IN | DIASTOLIC BLOOD PRESSURE: 91 MMHG | SYSTOLIC BLOOD PRESSURE: 157 MMHG | OXYGEN SATURATION: 97 % | BODY MASS INDEX: 47.09 KG/M2

## 2025-02-05 DIAGNOSIS — I47.10 PAROXYSMAL SUPRAVENTRICULAR TACHYCARDIA: Primary | ICD-10-CM

## 2025-02-05 DIAGNOSIS — R00.2 PALPITATIONS: ICD-10-CM

## 2025-02-05 DIAGNOSIS — E66.01 MORBID OBESITY (H): ICD-10-CM

## 2025-02-05 DIAGNOSIS — I89.0 LYMPHEDEMA: ICD-10-CM

## 2025-02-05 DIAGNOSIS — I10 PRIMARY HYPERTENSION: ICD-10-CM

## 2025-02-05 DIAGNOSIS — E11.65 TYPE 2 DIABETES MELLITUS WITH HYPERGLYCEMIA, WITHOUT LONG-TERM CURRENT USE OF INSULIN (H): ICD-10-CM

## 2025-02-05 DIAGNOSIS — E11.9 TYPE 2 DIABETES MELLITUS WITHOUT COMPLICATION, WITHOUT LONG-TERM CURRENT USE OF INSULIN (H): Primary | ICD-10-CM

## 2025-02-05 NOTE — LETTER
2/5/2025    Dennis Narayanan MD  [address]  [facility] [address]    RE: Donna Elmore       Dear Colleague,     I had the pleasure of seeing Donna Elmore in the [facility].  HPI and Plan:   Donna Elmore is a 64 year old female who presents with history of hypertension, SVT, morbid obesity and diabetes.  She was last seen in 2020 and had been doing well at that time.  She was placed on Ozempic for diabetes which really helps control her diabetes.  She did have an increase in the dosage and with that started to develop palpitations again and racing heart.  She went back to her doctor and he recommended to reduce the dose back to 1 mg subcu daily however she continues to have intermittent palpitation symptoms and therefore presented today for further evaluation she denies any chest discomfort or shortness of breath..    We did perform an electrocardiogram in office today which demonstrates a normal sinus rhythm without acute ST or T wave abnormality  Cardiac exam today is normal, she does have severe lymphedema and we talked a little bit about this today as well as referral to lymphedema clinic    Summary    1.  Palpitation and racing heart-may be a side effect of Ozempic, recommend a trial of Jardiance in place of Ozempic.  I have asked her to discuss this with her PMD.  I will also recommend a Zio patch monitor to evaluate for arrhythmias.  We will have this placed in office today.  She remains on metoprolol for history of SVT.  If the above changes are not helpful in reducing her symptoms can consider change in med management for her palpitations.    Will follow-up with the test results once complete, please feel free to contact me with any questions given regards to her care    Today's clinic visit entailed:  Review of external notes as documented elsewhere in note  Ordering of each unique test    Provider  Link to MDM Help Grid     The level of medical decision  making during this visit was of moderate complexity.The longitudinal plan of care for the diagnosis(es)/condition(s) as documented were addressed during this visit. Due to the added complexity in care, I will continue to support Donna in the subsequent management and with ongoing continuity of care.     Orders Placed This Encounter   Procedures     ZIO PATCH 3-7 DAYS APPLICATION     Lymphedema Therapy  Referral     EKG 12-lead complete w/read - Clinics (performed today)     ZIO PATCH 3-7 DAYS (additional cost to patient)     No orders of the defined types were placed in this encounter.    Medications Discontinued During This Encounter   Medication Reason     losartan (COZAAR) 50 MG tablet Stopped by Patient (No AVS)         Encounter Diagnoses   Name Primary?     Paroxysmal supraventricular tachycardia Yes     Morbid obesity (H)      Primary hypertension      Type 2 diabetes mellitus with hyperglycemia, without long-term current use of insulin (H)      Palpitations      Lymphedema        CURRENT MEDICATIONS:  Current Outpatient Medications   Medication Sig Dispense Refill     albuterol (PROVENTIL) (2.5 MG/3ML) 0.083% neb solution Take 1 vial (2.5 mg) by nebulization every 6 hours as needed for shortness of breath, wheezing or cough. 90 mL 0     blood glucose (NO BRAND SPECIFIED) lancets standard Use to test blood sugar 1 times daily or as directed. 100 each 3     blood glucose (NO BRAND SPECIFIED) test strip Use to test blood sugar 1 times daily or as directed. 100 strip 3     Blood Glucose Monitoring Suppl (BLOOD GLUCOSE MONITOR SYSTEM) w/Device KIT 1 kit daily 1 kit 0     Continuous Glucose Sensor (FREESTYLE MARTY 2 SENSOR) [facility] USE 1 DEVICE AND CHANGE EVERY 14 DAYS 6 each 3     ELDERBERRY PO Take 1 tablet by mouth daily       ibuprofen (ADVIL/MOTRIN) 200 MG tablet Take 400 mg by mouth every 4 hours as needed for mild pain.       ipratropium (ATROVENT HFA) 17 MCG/ACT inhaler Inhale 2 puffs into the lungs  "every 6 hours as needed for wheezing. PRN 12.9 g 1     magnesium 250 MG tablet Take 1 tablet by mouth daily       metoprolol succinate ER (TOPROL XL) 100 MG 24 hr tablet TAKE 1 TABLET(100 MG) BY MOUTH DAILY 90 tablet 0     Probiotic Product (PROBIOTIC DAILY PO)        Semaglutide, 1 MG/DOSE, (OZEMPIC, 1 MG/DOSE,) 4 MG/3ML pen Inject 1 mg subcutaneously every 7 days. 3 mL 2     VITAMIN D, CHOLECALCIFEROL, PO Take 5,000 Units by mouth daily 1000units with Vitamin C 500mg - 3 tabs am & 2 tabs pm       Zinc 50 MG CAPS        fluconazole (DIFLUCAN) 150 MG tablet Take one tablet now and may repeat in 3-5 days if needed (Patient not taking: Reported on 2/5/2025) 2 tablet 0     Semaglutide, 2 MG/DOSE, (OZEMPIC) 8 MG/3ML pen Inject 2 mg Subcutaneous every 7 days (Patient not taking: Reported on 2/5/2025) 9 mL 3       ALLERGIES     Allergies   Allergen Reactions     Percocet [Oxycodone-Acetaminophen]        PAST MEDICAL HISTORY:  Past Medical History:   Diagnosis Date     Abnormal glucose 2/1/17    \"mild DM\" per 2/1/17 lab note-Dr Cornell     Appendicitis 9/2015     Depression      Hepatitis A      HTN (hypertension)      Obese      Osteoarthritis of left knee      Plantar fasciitis      Shingles      SVT (supraventricular tachycardia) 8/15/2012    1/20/2010 SVT ablation     Uterine cancer (H) 3/2010    + washings     Venous dermatitis        PAST SURGICAL HISTORY:  Past Surgical History:   Procedure Laterality Date     ------------OTHER-------------  7-2011    cyst removal - back     CATHETER, ABLATION  1-2010    tachycardia ablation     D & C  3-2010     HYSTERECTOMY RADICAL  3-2010     LAPAROSCOPIC APPENDECTOMY  9/27/2013    Procedure: LAPAROSCOPIC APPENDECTOMY;  LAPAROSCOPIC APPENDECTOMY;  Surgeon: Noreen Brambila MD;  Location:  OR       FAMILY HISTORY:  Family History   Problem Relation Age of Onset     Heart Disease Mother         pacemaker, heart failure     Thyroid Disease Mother      Hypertension Mother      Cancer " Father         kidney     Heart Disease Father      Hypertension Father      Thyroid Disease Sister        SOCIAL HISTORY:  Social History     Socioeconomic History     Marital status:      Spouse name: None     Number of children: None     Years of education: None     Highest education level: None   Tobacco Use     Smoking status: Never     Smokeless tobacco: Never   Substance and Sexual Activity     Alcohol use: No     Drug use: No   Other Topics Concern     Caffeine Concern Yes     Comment: 2-3 Diet Coke      Sleep Concern Yes     Stress Concern Yes     Weight Concern Yes     Back Care No     Exercise No     Seat Belt Yes     Social Drivers of Health     Financial Resource Strain: Low Risk  (5/13/2024)    Financial Resource Strain      Within the past 12 months, have you or your family members you live with been unable to get utilities (heat, electricity) when it was really needed?: No   Food Insecurity: Low Risk  (5/13/2024)    Food Insecurity      Within the past 12 months, did you worry that your food would run out before you got money to buy more?: No      Within the past 12 months, did the food you bought just not last and you didn t have money to get more?: No   Transportation Needs: Low Risk  (5/13/2024)    Transportation Needs      Within the past 12 months, has lack of transportation kept you from medical appointments, getting your medicines, non-medical meetings or appointments, work, or from getting things that you need?: No   Interpersonal Safety: Not At Risk (7/14/2023)    Received from [facility]     IP Custom IPV      Do you feel UNSAFE in any of your personal relationships with your family members or any other acquaintances?: No   Housing Stability: Low Risk  (5/13/2024)    Housing Stability      Do you have housing? : Yes      Are you worried about losing your housing?: No       Review of Systems:  Skin:        Eyes:       ENT:       Respiratory:   "Positive for dyspnea on exertion  Cardiovascular:  Negative, chest pain, syncope or near-syncope, lightheadedness, dizziness, cyanosis, fatigue palpitations, Positive for  Gastroenterology:      Genitourinary:       Musculoskeletal:       Neurologic:       Psychiatric:       Heme/Lymph/Imm:       Endocrine:  Positive for diabetes    Physical Exam:  Vitals: BP (!) 157/91   Pulse 81   Ht 1.676 m (5' 6\")   Wt (!) 173.6 kg (382 lb 11.2 oz)   SpO2 97%   BMI 61.77 kg/m      Constitutional:  cooperative, in no acute distress morbidly obese      Skin:  warm and dry to the touch          Head:  normocephalic        Eyes:  pupils equal and round        Lymph:      ENT:           Neck:  no carotid bruit        Respiratory:  clear to auscultation, normal symmetry         Cardiac: regular rhythm, no murmurs, gallops or rubs detected                  pulses below the femoral arteries are diminished                                      GI:  abdomen soft, no bruits obese      Extremities and Muscular Skeletal:      bilateral LE edema     severe lymphedema    Neurological:  no gross motor deficits, affect appropriate        Psych:  Alert and Oriented x 3        Recent Lab Results:  LIPID RESULTS:  Lab Results   Component Value Date    CHOL 120 01/04/2024    CHOL 122 11/10/2022    HDL 43 01/04/2024    HDL 50 11/10/2022    LDL 62 01/04/2024    LDL 52 11/10/2022    TRIG 77 01/04/2024    TRIG 107 11/10/2022    CHOLHDLRATIO 2.7 11/05/2015       LIVER ENZYME RESULTS:  Lab Results   Component Value Date    AST 24 12/09/2021    ALT 31 12/09/2021       CBC RESULTS:  Lab Results   Component Value Date    WBC 6.1 12/19/2019    WBC 7.5 01/20/2010    RBC 5.22 (A) 12/19/2019    RBC 4.93 01/20/2010    HGB 14.1 02/01/2022    HCT 43.9 02/01/2022    MCV 82 02/01/2022    MCH 26.3 (L) 02/01/2022    MCHC 32.1 02/01/2022    RDW 13.9 02/01/2022     12/19/2019       BMP RESULTS:  Lab Results   Component Value Date     01/04/2024     " "11/10/2022    POTASSIUM 4.3 01/04/2024    POTASSIUM 4.8 11/10/2022    CHLORIDE 104 01/04/2024    CHLORIDE 103 11/10/2022    CO2 26 01/04/2024    CO2 28 08/13/2020    ANIONGAP 8 01/04/2024    ANIONGAP 2 (L) 08/13/2020     (H) 01/04/2024    GLC 92 11/10/2022    BUN 23.7 (H) 01/04/2024    BUN 22 11/10/2022    BUN 30 (H) 11/10/2022    CR 0.84 01/04/2024    CR 0.74 11/10/2022    GFRESTIMATED 78 01/04/2024    GFRESTIMATED 76 08/13/2020    GFRESTBLACK 88 08/13/2020    ROBYN 9.1 01/04/2024    ROBYN 9.3 11/10/2022        A1C RESULTS:  Lab Results   Component Value Date    A1C 6.1 (A) 01/04/2024       INR RESULTS:  No results found for: \"INR\"        CC  Referred Self, MD  No address on file                  Donna Elmore arrived here on 2/5/2025 2:48 PM for 3-7 Days  Zio monitor placement per ordering provider Dr. Richardson for the diagnosis palpitations.  Patient s skin was prepped per protocol. Dr. Richardson is the supervising MD.  Zio monitor was placed.  Instructions were reviewed with and given to the patient.  Patient verbalized understanding of wear, troubleshooting and monitor return instructions.    Aure Hunter LPN      Thank you for allowing me to participate in the care of your patient.      Sincerely,     Radha Richardson, [facility]  cc:   Referred MD Haider  No address on file      "

## 2025-02-05 NOTE — PROGRESS NOTES
HPI and Plan:   Donna Elmore is a 64 year old female who presents with history of hypertension, SVT, morbid obesity and diabetes.  She was last seen in 2020 and had been doing well at that time.  She was placed on Ozempic for diabetes which really helps control her diabetes.  She did have an increase in the dosage and with that started to develop palpitations again and racing heart.  She went back to her doctor and he recommended to reduce the dose back to 1 mg subcu daily however she continues to have intermittent palpitation symptoms and therefore presented today for further evaluation she denies any chest discomfort or shortness of breath..    We did perform an electrocardiogram in office today which demonstrates a normal sinus rhythm without acute ST or T wave abnormality  Cardiac exam today is normal, she does have severe lymphedema and we talked a little bit about this today as well as referral to lymphedema clinic    Summary    1.  Palpitation and racing heart-may be a side effect of Ozempic, recommend a trial of Jardiance in place of Ozempic.  I have asked her to discuss this with her PMD.  I will also recommend a Zio patch monitor to evaluate for arrhythmias.  We will have this placed in office today.  She remains on metoprolol for history of SVT.  If the above changes are not helpful in reducing her symptoms can consider change in med management for her palpitations.    Will follow-up with the test results once complete, please feel free to contact me with any questions given regards to her care    Today's clinic visit entailed:  Review of external notes as documented elsewhere in note  Ordering of each unique test    Provider  Link to [facility] Help Grid     The level of medical decision making during this visit was of moderate complexity.The longitudinal plan of care for the diagnosis(es)/condition(s) as documented were addressed during this visit. Due to the added complexity in care, I will continue to  support Donna in the subsequent management and with ongoing continuity of care.     Orders Placed This Encounter   Procedures    ZIO PATCH 3-7 DAYS APPLICATION    Lymphedema Therapy  Referral    EKG 12-lead complete w/read - Clinics (performed today)    ZIO PATCH 3-7 DAYS (additional cost to patient)     No orders of the defined types were placed in this encounter.    Medications Discontinued During This Encounter   Medication Reason    losartan (COZAAR) 50 MG tablet Stopped by Patient (No AVS)         Encounter Diagnoses   Name Primary?    Paroxysmal supraventricular tachycardia Yes    Morbid obesity (H)     Primary hypertension     Type 2 diabetes mellitus with hyperglycemia, without long-term current use of insulin (H)     Palpitations     Lymphedema        CURRENT MEDICATIONS:  Current Outpatient Medications   Medication Sig Dispense Refill    albuterol (PROVENTIL) (2.5 MG/3ML) 0.083% neb solution Take 1 vial (2.5 mg) by nebulization every 6 hours as needed for shortness of breath, wheezing or cough. 90 mL 0    blood glucose (NO BRAND SPECIFIED) lancets standard Use to test blood sugar 1 times daily or as directed. 100 each 3    blood glucose (NO BRAND SPECIFIED) test strip Use to test blood sugar 1 times daily or as directed. 100 strip 3    Blood Glucose Monitoring Suppl (BLOOD GLUCOSE MONITOR SYSTEM) w/Device KIT 1 kit daily 1 kit 0    Continuous Glucose Sensor (FREESTYLE MARTY 2 SENSOR) [facility] 1 DEVICE AND CHANGE EVERY 14 DAYS 6 each 3    ELDERBERRY PO Take 1 tablet by mouth daily      ibuprofen (ADVIL/MOTRIN) 200 MG tablet Take 400 mg by mouth every 4 hours as needed for mild pain.      ipratropium (ATROVENT HFA) 17 MCG/ACT inhaler Inhale 2 puffs into the lungs every 6 hours as needed for wheezing. PRN 12.9 g 1    magnesium 250 MG tablet Take 1 tablet by mouth daily      metoprolol succinate ER (TOPROL XL) 100 MG 24 hr tablet TAKE 1 TABLET(100 MG) BY MOUTH DAILY 90 tablet 0    Probiotic Product  "(PROBIOTIC DAILY PO)       Semaglutide, 1 MG/DOSE, (OZEMPIC, 1 MG/DOSE,) 4 MG/3ML pen Inject 1 mg subcutaneously every 7 days. 3 mL 2    VITAMIN D, CHOLECALCIFEROL, PO Take 5,000 Units by mouth daily 1000units with Vitamin C 500mg - 3 tabs am & 2 tabs pm      Zinc 50 MG CAPS       fluconazole (DIFLUCAN) 150 MG tablet Take one tablet now and may repeat in 3-5 days if needed (Patient not taking: Reported on 2/5/2025) 2 tablet 0    Semaglutide, 2 MG/DOSE, (OZEMPIC) 8 MG/3ML pen Inject 2 mg Subcutaneous every 7 days (Patient not taking: Reported on 2/5/2025) 9 mL 3       ALLERGIES     Allergies   Allergen Reactions    Percocet [Oxycodone-Acetaminophen]        PAST MEDICAL HISTORY:  Past Medical History:   Diagnosis Date    Abnormal glucose 2/1/17    \"mild DM\" per 2/1/17 lab note-Dr Cornell    Appendicitis 9/2015    Depression     Hepatitis A     HTN (hypertension)     Obese     Osteoarthritis of left knee     Plantar fasciitis     Shingles     SVT (supraventricular tachycardia) 8/15/2012    1/20/2010 SVT ablation    Uterine cancer (H) 3/2010    + washings    Venous dermatitis        PAST SURGICAL HISTORY:  Past Surgical History:   Procedure Laterality Date    ------------OTHER-------------  7-2011    cyst removal - back    CATHETER, ABLATION  1-2010    tachycardia ablation    D & C  3-2010    HYSTERECTOMY RADICAL  3-2010    LAPAROSCOPIC APPENDECTOMY  9/27/2013    Procedure: LAPAROSCOPIC APPENDECTOMY;  LAPAROSCOPIC APPENDECTOMY;  Surgeon: Noreen Brambila MD;  Location:  OR       FAMILY HISTORY:  Family History   Problem Relation Age of Onset    Heart Disease Mother         pacemaker, heart failure    Thyroid Disease Mother     Hypertension Mother     Cancer Father         kidney    Heart Disease Father     Hypertension Father     Thyroid Disease Sister        SOCIAL HISTORY:  Social History     Socioeconomic History    Marital status:      Spouse name: None    Number of children: None    Years of education: None "    Highest education level: None   Tobacco Use    Smoking status: Never    Smokeless tobacco: Never   Substance and Sexual Activity    Alcohol use: No    Drug use: No   Other Topics Concern    Caffeine Concern Yes     Comment: 2-3 Diet Coke     Sleep Concern Yes    Stress Concern Yes    Weight Concern Yes    Back Care No    Exercise No    Seat Belt Yes     Social Drivers of Health     Financial Resource Strain: Low Risk  (5/13/2024)    Financial Resource Strain     Within the past 12 months, have you or your family members you live with been unable to get utilities (heat, electricity) when it was really needed?: No   Food Insecurity: Low Risk  (5/13/2024)    Food Insecurity     Within the past 12 months, did you worry that your food would run out before you got money to buy more?: No     Within the past 12 months, did the food you bought just not last and you didn t have money to get more?: No   Transportation Needs: Low Risk  (5/13/2024)    Transportation Needs     Within the past 12 months, has lack of transportation kept you from medical appointments, getting your medicines, non-medical meetings or appointments, work, or from getting things that you need?: No   Interpersonal Safety: Not At Risk (7/14/2023)    Received from [facility] and [facility]     IP Custom IPV     Do you feel UNSAFE in any of your personal relationships with your family members or any other acquaintances?: No   Housing Stability: Low Risk  (5/13/2024)    Housing Stability     Do you have housing? : Yes     Are you worried about losing your housing?: No       Review of Systems:  Skin:        Eyes:       ENT:       Respiratory:  Positive for dyspnea on exertion  Cardiovascular:  Negative, chest pain, syncope or near-syncope, lightheadedness, dizziness, cyanosis, fatigue palpitations, Positive for  Gastroenterology:      Genitourinary:       Musculoskeletal:       Neurologic:       Psychiatric:       Heme/Lymph/Imm:      "  Endocrine:  Positive for diabetes    Physical Exam:  Vitals: BP (!) 157/91   Pulse 81   Ht 1.676 m (5' 6\")   Wt (!) 173.6 kg (382 lb 11.2 oz)   SpO2 97%   BMI 61.77 kg/m      Constitutional:  cooperative, in no acute distress morbidly obese      Skin:  warm and dry to the touch          Head:  normocephalic        Eyes:  pupils equal and round        Lymph:      ENT:           Neck:  no carotid bruit        Respiratory:  clear to auscultation, normal symmetry         Cardiac: regular rhythm, no murmurs, gallops or rubs detected                  pulses below the femoral arteries are diminished                                      GI:  abdomen soft, no bruits obese      Extremities and Muscular Skeletal:      bilateral LE edema     severe lymphedema    Neurological:  no gross motor deficits, affect appropriate        Psych:  Alert and Oriented x 3        Recent Lab Results:  LIPID RESULTS:  Lab Results   Component Value Date    CHOL 120 01/04/2024    CHOL 122 11/10/2022    HDL 43 01/04/2024    HDL 50 11/10/2022    LDL 62 01/04/2024    LDL 52 11/10/2022    TRIG 77 01/04/2024    TRIG 107 11/10/2022    CHOLHDLRATIO 2.7 11/05/2015       LIVER ENZYME RESULTS:  Lab Results   Component Value Date    AST 24 12/09/2021    ALT 31 12/09/2021       CBC RESULTS:  Lab Results   Component Value Date    WBC 6.1 12/19/2019    WBC 7.5 01/20/2010    RBC 5.22 (A) 12/19/2019    RBC 4.93 01/20/2010    HGB 14.1 02/01/2022    HCT 43.9 02/01/2022    MCV 82 02/01/2022    MCH 26.3 (L) 02/01/2022    MCHC 32.1 02/01/2022    RDW 13.9 02/01/2022     12/19/2019       BMP RESULTS:  Lab Results   Component Value Date     01/04/2024     11/10/2022    POTASSIUM 4.3 01/04/2024    POTASSIUM 4.8 11/10/2022    CHLORIDE 104 01/04/2024    CHLORIDE 103 11/10/2022    CO2 26 01/04/2024    CO2 28 08/13/2020    ANIONGAP 8 01/04/2024    ANIONGAP 2 (L) 08/13/2020     (H) 01/04/2024    GLC 92 11/10/2022    BUN 23.7 (H) 01/04/2024    " "BUN 22 11/10/2022    BUN 30 (H) 11/10/2022    CR 0.84 01/04/2024    CR 0.74 11/10/2022    GFRESTIMATED 78 01/04/2024    GFRESTIMATED 76 08/13/2020    GFRESTBLACK 88 08/13/2020    ROBYN 9.1 01/04/2024    ROBYN 9.3 11/10/2022        A1C RESULTS:  Lab Results   Component Value Date    A1C 6.1 (A) 01/04/2024       INR RESULTS:  No results found for: \"INR\"        CC  Referred Self, MD  No address on file                "

## 2025-02-05 NOTE — PROGRESS NOTES
Donna Elmore arrived here on 2/5/2025 2:48 PM for 3-7 Days  Zio monitor placement per ordering provider Dr. Richardson for the diagnosis palpitations.  Patient s skin was prepped per protocol. Dr. Richardson is the supervising MD.  Zio monitor was placed.  Instructions were reviewed with and given to the patient.  Patient verbalized understanding of wear, troubleshooting and monitor return instructions.    Aure Hunter LPN

## 2025-02-10 NOTE — TELEPHONE ENCOUNTER
See MyChart message from patient and 's 25 visit notes regarding Ozempic and recommending change to Jardiance.     Dorothy Wylie PharmD and Dr. Narayanan reviewed and recommend:   Send in prescription for Jardiance 10, recheck BMP in 1 month then increase to 25mg daily.  Dorothy Wylie, Pharm.D, BCACP      Rx was sent for Jardiance 10mg #30 si po QD.   Future BMP order placed to be done near end of 30 days.   Left message for patient with plan and sent [employer] reply with plan.  Alicia Brennan RN

## 2025-02-17 LAB — CV ZIO PRELIM RESULTS: NORMAL

## 2025-02-19 ENCOUNTER — TELEPHONE (OUTPATIENT)
Dept: CARDIOLOGY | Facility: CLINIC | Age: 65
End: 2025-02-19
Payer: COMMERCIAL

## 2025-02-19 DIAGNOSIS — I47.10 PAROXYSMAL SUPRAVENTRICULAR TACHYCARDIA: Primary | ICD-10-CM

## 2025-02-19 NOTE — TELEPHONE ENCOUNTER
Please see Zio results and advise, per last OV note:    1.  Palpitation and racing heart-may be a side effect of Ozempic, recommend a trial of Jardiance in place of Ozempic.  I have asked her to discuss this with her PMD.  I will also recommend a Zio patch monitor to evaluate for arrhythmias.  We will have this placed in office today.  She remains on metoprolol for history of SVT.  If the above changes are not helpful in reducing her symptoms can consider change in med management for her palpitations.     Agreed with findings  Symptoms reported (4 episodes) were mostly related to pAF or PSVT  pAF(<1%), longest 8 mins, avg 113 bpm.  Monitoring period: 7-day

## 2025-02-20 ENCOUNTER — OFFICE VISIT (OUTPATIENT)
Dept: FAMILY MEDICINE | Facility: CLINIC | Age: 65
End: 2025-02-20

## 2025-02-20 VITALS
BODY MASS INDEX: 63.14 KG/M2 | OXYGEN SATURATION: 97 % | DIASTOLIC BLOOD PRESSURE: 67 MMHG | HEART RATE: 63 BPM | SYSTOLIC BLOOD PRESSURE: 157 MMHG | WEIGHT: 293 LBS

## 2025-02-20 DIAGNOSIS — B37.31 CANDIDAL VULVOVAGINITIS: Primary | ICD-10-CM

## 2025-02-20 DIAGNOSIS — I10 PRIMARY HYPERTENSION: ICD-10-CM

## 2025-02-20 DIAGNOSIS — N89.8 VAGINAL ITCHING: ICD-10-CM

## 2025-02-20 LAB
CLUE CELLS: ABNORMAL
TRICHOMONAS (WET PREP): ABNORMAL
YEAST (WET PREP): PRESENT

## 2025-02-20 RX ORDER — FLUCONAZOLE 150 MG/1
150 TABLET ORAL
Qty: 2 TABLET | Refills: 0 | Status: SHIPPED | OUTPATIENT
Start: 2025-02-20 | End: 2025-02-24

## 2025-02-20 NOTE — PROGRESS NOTES
Assessment & Plan     Candidal vulvovaginitis  Wet prep was positive for yeast. Rx for diflucan. Side effects of medication reviewed. We discussed the diagnosis, pathophysiology and natural history along with prevention techniques. Recommend follow up is symptoms worsen or fail to improve. Patient agreeable to plan. All questions answered.   - fluconazole (DIFLUCAN) 150 MG tablet  Dispense: 2 tablet; Refill: 0    Vaginal itching  - Wet Prep ([facility])    Primary hypertension  Blood pressure was elevated at today's visit. Recommendations include monitoring blood pressures over the next 1-2 weeks and if blood pressure continues to be elevated over 140/90 recommend follow up for a blood pressure management visit. Discussed importance of a healthy weight, along with diet and exercise. Patient agreeable to plan.                Work on weight loss  Regular exercise  See Patient Instructions    Return for Follow up.    Ashok Thompson is a 64 year old, presenting for the following health issues:  Vaginal Problem (Sx of burning, itching, and odor in crotch area for around a week)    History of Present Illness       Reason for visit:  Burning and itching like a yeast infection  Symptom onset:  3-7 days ago  Symptoms include:  Burning and itching in crotch  Symptom intensity:  Mild  Symptom progression:  Staying the same  Had these symptoms before:  No  What makes it worse:  Wiping after urination  What makes it better:  It's not constant, it comes and goes throughout the day   She is taking medications regularly.         Vaginal Symptoms  Onset/Duration: 1 week ago. On surface. Not constant. Just started on Jardiance. Better off ozempic   Description:  Vaginal Discharge: none   Itching (Pruritis): YES  Burning sensation:  YES  Odor: YES-worse than yeast infection  Accompanying Signs & Symptoms:  Urinary symptoms: No-some frequency   Abdominal pain: No  Fever: No  History:   Sexually active: No  New Partner: No    Recent  antibiotic use: No  Previous vaginitis issues: No  Precipitating or alleviating factors: None  Therapies tried and outcome: none    IN some ways feels similar     Review of Systems  Constitutional, HEENT, cardiovascular, pulmonary, gi and gu systems are negative, except as otherwise noted.      Objective    BP (!) 157/67   Pulse 63   Wt (!) 177.4 kg (391 lb 3.2 oz)   SpO2 97%   BMI 63.14 kg/m    Body mass index is 63.14 kg/m .  Physical Exam   GENERAL: alert and no distress  EYES: Eyes grossly normal to inspection, PERRL and conjunctivae and sclerae normal  RESP: respirations even and unlabored   (female): normal urethral meatus  and vaginal discharge - white and thick  PSYCH: mentation appears normal, affect normal/bright    Results for orders placed or performed in visit on 02/20/25 (from the past 24 hours)   Wet Prep ([facility])   Result Value Ref Range    CLUE CELLS Absent Absent    YEAST (WET PREP) Present (A) Absent    Trichomonas Wet Prep Absent Absent           Signed Electronically by: RICHMOND Anderson CNP

## 2025-02-23 ENCOUNTER — HEALTH MAINTENANCE LETTER (OUTPATIENT)
Age: 65
End: 2025-02-23

## 2025-02-25 NOTE — TELEPHONE ENCOUNTER
Radha Richardson DO Lidke, Jen M, RN  Caller: Unspecified (6 days ago,  8:41 AM)  Needs follow-up visit with KENTON or me to discuss new finding of PAF    Recommendations discussed with patient and follow up appt made.  Janice Staton, RN on 2/25/2025 at 11:58 AM

## 2025-02-28 ENCOUNTER — TELEPHONE (OUTPATIENT)
Dept: CARDIOLOGY | Facility: CLINIC | Age: 65
End: 2025-02-28
Payer: COMMERCIAL

## 2025-02-28 NOTE — TELEPHONE ENCOUNTER
Health Call Center    Phone Message    May a detailed message be left on voicemail: yes     Reason for Call: Other: While wearing the heart monitor pt was on Ozempic and feels the heart palpitations were from the Ozempic and now that she is off she feels a lot better.  Pt wonders if she should repeat the heart monitor again before she see's Dr. Richardson again.  Please call to advise.    Action Taken: Message routed to:  [facility] ([facility] cardio    Travel Screening: Not Applicable       Thank you!  [facility]    Date of Service:

## 2025-03-11 ENCOUNTER — MYC MEDICAL ADVICE (OUTPATIENT)
Dept: FAMILY MEDICINE | Facility: CLINIC | Age: 65
End: 2025-03-11

## 2025-03-11 DIAGNOSIS — B37.31 CANDIDAL VULVOVAGINITIS: Primary | ICD-10-CM

## 2025-03-11 RX ORDER — FLUCONAZOLE 150 MG/1
150 TABLET ORAL
Qty: 2 TABLET | Refills: 0 | Status: SHIPPED | OUTPATIENT
Start: 2025-03-11 | End: 2025-03-13

## 2025-03-11 NOTE — TELEPHONE ENCOUNTER
Candidal vulvovaginitis  (primary encounter diagnosis)  Plan: fluconazole (DIFLUCAN) 150 MG tablet

## 2025-03-13 ENCOUNTER — OFFICE VISIT (OUTPATIENT)
Dept: CARDIOLOGY | Facility: CLINIC | Age: 65
End: 2025-03-13
Payer: COMMERCIAL

## 2025-03-13 ENCOUNTER — MYC MEDICAL ADVICE (OUTPATIENT)
Dept: FAMILY MEDICINE | Facility: CLINIC | Age: 65
End: 2025-03-13

## 2025-03-13 ENCOUNTER — OFFICE VISIT (OUTPATIENT)
Dept: FAMILY MEDICINE | Facility: CLINIC | Age: 65
End: 2025-03-13

## 2025-03-13 VITALS
BODY MASS INDEX: 50.02 KG/M2 | HEART RATE: 64 BPM | WEIGHT: 293 LBS | SYSTOLIC BLOOD PRESSURE: 171 MMHG | OXYGEN SATURATION: 97 % | DIASTOLIC BLOOD PRESSURE: 75 MMHG | HEIGHT: 64 IN

## 2025-03-13 VITALS — HEIGHT: 64 IN | HEART RATE: 68 BPM | OXYGEN SATURATION: 96 % | BODY MASS INDEX: 50.02 KG/M2 | WEIGHT: 293 LBS

## 2025-03-13 DIAGNOSIS — I47.10 PAROXYSMAL SUPRAVENTRICULAR TACHYCARDIA: ICD-10-CM

## 2025-03-13 DIAGNOSIS — E11.9 TYPE 2 DIABETES MELLITUS WITHOUT COMPLICATION, WITHOUT LONG-TERM CURRENT USE OF INSULIN (H): Primary | ICD-10-CM

## 2025-03-13 DIAGNOSIS — I48.0 PAF (PAROXYSMAL ATRIAL FIBRILLATION) (H): ICD-10-CM

## 2025-03-13 DIAGNOSIS — E11.9 TYPE 2 DIABETES MELLITUS WITHOUT COMPLICATION, WITHOUT LONG-TERM CURRENT USE OF INSULIN (H): ICD-10-CM

## 2025-03-13 DIAGNOSIS — C54.1 ENDOMETRIAL/UTERINE ADENOCARCINOMA (H): ICD-10-CM

## 2025-03-13 DIAGNOSIS — E11.65 TYPE 2 DIABETES MELLITUS WITH HYPERGLYCEMIA, WITHOUT LONG-TERM CURRENT USE OF INSULIN (H): ICD-10-CM

## 2025-03-13 DIAGNOSIS — I10 PRIMARY HYPERTENSION: ICD-10-CM

## 2025-03-13 DIAGNOSIS — E66.01 MORBID OBESITY (H): Primary | ICD-10-CM

## 2025-03-13 DIAGNOSIS — F33.2 SEVERE EPISODE OF RECURRENT MAJOR DEPRESSIVE DISORDER, WITHOUT PSYCHOTIC FEATURES (H): ICD-10-CM

## 2025-03-13 DIAGNOSIS — B37.31 CANDIDAL VULVOVAGINITIS: ICD-10-CM

## 2025-03-13 LAB
CHOLESTEROL: 121 MG/DL (ref 100–199)
FASTING?: YES
HBA1C MFR BLD: 6.6 % (ref 4–6)
HDL (RMG): 37 MG/DL (ref 40–?)
LDL CALCULATED (RMG): 72 MG/DL (ref 0–130)
TRIGLYCERIDES (RMG): 60 MG/DL (ref 0–149)

## 2025-03-13 PROCEDURE — 99207 PR FOOT EXAM NO CHARGE: CPT | Performed by: FAMILY MEDICINE

## 2025-03-13 PROCEDURE — 80061 LIPID PANEL: CPT | Mod: QW | Performed by: FAMILY MEDICINE

## 2025-03-13 PROCEDURE — 36415 COLL VENOUS BLD VENIPUNCTURE: CPT | Performed by: FAMILY MEDICINE

## 2025-03-13 PROCEDURE — 83036 HEMOGLOBIN GLYCOSYLATED A1C: CPT | Performed by: FAMILY MEDICINE

## 2025-03-13 PROCEDURE — G2211 COMPLEX E/M VISIT ADD ON: HCPCS | Performed by: FAMILY MEDICINE

## 2025-03-13 PROCEDURE — 99214 OFFICE O/P EST MOD 30 MIN: CPT | Performed by: FAMILY MEDICINE

## 2025-03-13 RX ORDER — CARVEDILOL 25 MG/1
25 TABLET ORAL 2 TIMES DAILY WITH MEALS
Qty: 60 TABLET | Refills: 11 | Status: SHIPPED | OUTPATIENT
Start: 2025-03-13

## 2025-03-13 RX ORDER — METFORMIN HYDROCHLORIDE 500 MG/1
500 TABLET, EXTENDED RELEASE ORAL
Qty: 60 TABLET | Refills: 0 | Status: SHIPPED | OUTPATIENT
Start: 2025-03-13 | End: 2025-03-13

## 2025-03-13 RX ORDER — METFORMIN HYDROCHLORIDE 500 MG/1
500 TABLET, EXTENDED RELEASE ORAL
Qty: 90 TABLET | Refills: 3 | Status: SHIPPED | OUTPATIENT
Start: 2025-03-13

## 2025-03-13 RX ORDER — FLUCONAZOLE 150 MG/1
150 TABLET ORAL DAILY
Qty: 6 TABLET | Refills: 1 | Status: SHIPPED | OUTPATIENT
Start: 2025-03-13

## 2025-03-13 NOTE — PROGRESS NOTES
"Diabetes  she  reports no new symptoms.  Tolerating these current diabetic meds:  Medications: yes:     Diabetes Medication(s)       Biguanides       metFORMIN (GLUCOPHAGE XR) 500 MG 24 hr tablet Take 1 tablet (500 mg) by mouth daily (with dinner).       Sodium-Glucose Co-Transporter 2 (SGLT2) Inhibitors       empagliflozin (JARDIANCE) 10 MG TABS tablet Take 1 tablet (10 mg) by mouth daily.       Incretin Mimetic Agents       semaglutide (OZEMPIC, 0.25 OR 0.5 MG/DOSE,) 2 MG/1.5ML SOPN pen (Discontinued) Inject 0.5 mg Subcutaneous every 7 days            No significnat or regular episodes of hypo or hyperglycemia  Medication compliance: compliant most of the time  Diabetic diet compliance: compliant most of the time  Diabetic ROS: no polyuria or polydipsia, no chest pain, dyspnea or TIA's, no numbness, tingling or pain in extremities    Home blood sugar monitoring: are performed regularly, Review of patients self blood glucose monitoring shows fasting most always < 130 and post prandial average under 170.  As a direct cause of their history of diabetes they have the following Diabetic complications: none    Most  recent A1C: Smoking: BP:   The last 5 available A1C values from [facility] reference lab, [facility]:  No components found for: \"[id number]\"     Lab Results   Component Value Date    A1C 6.6 03/13/2025    A1C 6.1 01/04/2024    A1C 6.2 11/10/2022    History   Smoking Status    Never   Smokeless Tobacco    Never    BP Readings from Last 1 Encounters:   03/13/25 (!) 171/75        Kidney studies:  Creatinine   Date Value Ref Range Status   01/04/2024 0.84 0.51 - 0.95 mg/dL Final   11/10/2022 0.74 0.57 - 1.00 mg/dL Final   12/09/2021 0.98 0.57 - 1.00 mg/dL Final   09/03/2021 0.72 0.57 - 1.00 mg/dL Final   ]    GFR Estimate   Date Value Ref Range Status   01/04/2024 78 >60 mL/min/1.73m2 Final   08/13/2020 76 >60 mL/min/[1.73_m2] Final     Comment:     Non  GFR Calc  Starting 12/18/2018, serum creatinine " "based estimated GFR (eGFR) will be   calculated using the Chronic Kidney Disease Epidemiology Collaboration   (CKD-EPI) equation.                  No components found for: \"MICORALBUMINLC\"      GFR Estimate If Black   Date Value Ref Range Status   08/13/2020 88 >60 mL/min/[1.73_m2] Final     Comment:      GFR Calc  Starting 12/18/2018, serum creatinine based estimated GFR (eGFR) will be   calculated using the Chronic Kidney Disease Epidemiology Collaboration   (CKD-EPI) equation.                The patient has had a history of obesity.  Reviewed the weigth curves.   Their current BMI is:  Body mass index is 69.03 kg/m .    Discussed current eating and exercise habits.     Reviewed weights in chart:  Wt Readings from Last 10 Encounters:   03/13/25 (!) 179.8 kg (396 lb 6.4 oz)   02/20/25 (!) 177.4 kg (391 lb 3.2 oz)   02/05/25 (!) 173.6 kg (382 lb 11.2 oz)   10/30/24 (!) 177.4 kg (391 lb)   02/19/24 (!) 174.1 kg (383 lb 12.8 oz)   01/04/24 (!) 176.2 kg (388 lb 6.4 oz)   11/08/22 (!) 176.4 kg (389 lb)   02/04/22 (!) 170.1 kg (375 lb)   02/01/22 (!) 170.1 kg (375 lb)   12/13/21 (!) 174.3 kg (384 lb 3.2 oz)       Answers submitted by the patient for this visit:  Diabetes Visit (Submitted on 3/12/2025)  Chief Complaint: Chronic problems general questions HPI Form  Frequency of checking blood sugars:: a few times a month  What time of day are you checking your blood sugars : before meals  Have you had any blood sugars above 200?: No  Have you had any blood sugars below 70?: No  Hypoglycemia symptoms:: shakiness  Diabetic concerns:: frequent infections, other  Paraesthesia present:: none of these symptoms  General Questionnaire (Submitted on 3/12/2025)  Chief Complaint: Chronic problems general questions HPI Form  How many servings of fruits and vegetables do you eat daily?: 2-3  On average, how many sweetened beverages do you drink each day (Examples: soda, juice, sweet tea, etc.  Do NOT count diet or " "artificially sweetened beverages)?: 0  How many minutes a day do you exercise enough to make your heart beat faster?: 9 or less  How many days a week do you exercise enough to make your heart beat faster?: 3 or less  How many days per week do you miss taking your medication?: 0  Questionnaire about: Chronic problems general questions HPI Form (Submitted on 3/12/2025)  Chief Complaint: Chronic problems general questions HPI Form    ROS:  General and Resp. completed and negative except as noted above    Histories: reviewed    OBJECTIVE:                                                    BP (!) 171/75   Pulse 64   Ht 1.614 m (5' 3.54\")   Wt (!) 179.8 kg (396 lb 6.4 oz)   SpO2 97%   BMI 69.03 kg/m    Body mass index is 69.03 kg/m .   APPEARANCE: = Relaxed and in no distress  Conj/Eyelids = noninjected and lids and lashes are without inflammation  Musculsktl =  Strength and ROM of major joints are within normal limits  Recent/Remote Memory = Alert and Oriented x 3  NEURO = CN II-XII are tested and no deficits found     ASSESSMENT/PLAN:                                                    Quality gaps reviewed    Donna was seen today for diabetes and medication problem.    Diagnoses and all orders for this visit:    Type 2 diabetes mellitus without complication, without long-term current use of insulin (H)  Having lots of yeast infections that she feels is mostly related to the jardiance  Wants to go back on Metformin and then after gets insurance figured out will get nutrition  -     Hemoglobin A1C ([facility])  -     Lipid Profile ([facility])  -     VENOUS COLLECTION  -     ID FOOT EXAM NO CHARGE  -     metFORMIN (GLUCOPHAGE XR) 500 MG 24 hr tablet; Take 1 tablet (500 mg) by mouth daily (with dinner).    History of Endometrial/uterine adenocarcinoma (H)    Severe episode of recurrent major depressive disorder, without psychotic features (H)  Spoke at length about mood disorders including suspected pathophysiology, role of " neurotransmitters, and treatment options including medication options ( especially SSRIs that treat cause of sx) and counselling.  Tolerating current meds. We discussed ongoing management of her  medications and how we will refill the medications now and in the future.    Candidal vulvovaginitis  .recyeest  -     fluconazole (DIFLUCAN) 150 MG tablet; Take 1 tablet (150 mg) by mouth daily.        Work on weight loss    Health maintenance items are reviewed in Epic and correct as of today:    Dennis Narayanan MD  [facility]  Family Practice  [facility]  [phone]    For any issues my office # is [phone]

## 2025-03-13 NOTE — PROGRESS NOTES
HPI and Plan:   Donna Elmore is a 64 year old female who presents with history of hypertension, SVT with remote AVNRT ablation procedure,, morbid obesity and diabetes.  She had been having increased palpitations recently after starting a higher dose of Ozempic.  Her symptoms persisted despite reducing the dose and so we asked her to wear a heart monitor and follow-up today.  Zio patch monitor showed predominantly sinus rhythm with first-degree AV block but she did have short bursts of both atrial tachycardia and atrial fibrillation, the longest duration about 8 minutes.  She had gone back to her primary care provider and been taken off of Ozempic and started on Jardiance however she started to develop yeast infections with this and subsequently was taken off of Jardiance.  She is now going back to metformin which she is picking up today.  After stopping Ozempic completely she did notice a decrease in her palpitation symptoms except for on Monday she had recurrence.  We spent most of this appointment in education and counseling about atrial fibrillation and her risks giving a [id number] score of 4 and different management strategies  Summary    1.  New onset paroxysmal atrial fibrillation-we talked about potential triggers including increased caffeine intake, uncontrolled hypertension, sleep apnea etc.  I would like to try her on carvedilol for better blood pressure control while still staying on beta-blocker.  She is agreeable so I gave her a new prescription for 25 mg twice daily to take in place of metoprolol for now I cautioned her about the potential side effects.  Her chads Vascor is 4 indicating increased risk for cerebral embolic events related to atrial fibrillation.  However the scoring mechanism does not take into consideration the duration.  She is fairly symptomatic with her palpitations knowing when her heart is racing and irregular and the heart monitor showed very brief less than 1% burden and  short duration of only 8 minutes.  We talked about anticoagulation and right now she is potentially changing insurance companies therefore she does not know the financial burden that the novel agents would cost her.  She is reluctant to warfarin at this time but would consider it.  After discussion we will hold off on anticoagulation for now as she is pretty symptomatic and has very brief and short duration of A-fib episodes.  Will continue to monitor and reassess anticoagulation with her next visit.  2.  Hypertension-her blood pressure is always elevated in clinic, however part of this may be an appropriate size of blood pressure cuff given her arm size.  She is not monitoring her blood pressure at home.  She does have a new wrist cuff which I would like her to use to kind of keep an eye on her blood pressure knowing that the wrist cuffs generally do read a bit higher.  Also her going to switch metoprolol to carvedilol in hopes that her blood pressure will be under better control    Please feel free to contact me with any questions given regards to her care           Today's clinic visit entailed:  Review of external notes as documented elsewhere in note  Review of the result(s) of each unique test - [place]  Prescription drug management    Provider  Link to [facility] Help Grid     The level of medical decision making during this visit was of moderate complexity.    No orders of the defined types were placed in this encounter.    Orders Placed This Encounter   Medications    carvedilol (COREG) 25 MG tablet     Sig: Take 1 tablet (25 mg) by mouth 2 times daily (with meals).     Dispense:  60 tablet     Refill:  11     Medications Discontinued During This Encounter   Medication Reason    metoprolol succinate ER (TOPROL XL) 100 MG 24 hr tablet          Encounter Diagnoses   Name Primary?    Paroxysmal supraventricular tachycardia     Morbid obesity (H) Yes    Type 2 diabetes mellitus with hyperglycemia, without long-term  current use of insulin (H)     Primary hypertension     PAF (paroxysmal atrial fibrillation) (H)        CURRENT MEDICATIONS:  Current Outpatient Medications   Medication Sig Dispense Refill    albuterol (PROVENTIL) (2.5 MG/3ML) 0.083% neb solution Take 1 vial (2.5 mg) by nebulization every 6 hours as needed for shortness of breath, wheezing or cough. 90 mL 0    blood glucose (NO BRAND SPECIFIED) lancets standard Use to test blood sugar 1 times daily or as directed. 100 each 3    blood glucose (NO BRAND SPECIFIED) test strip Use to test blood sugar 1 times daily or as directed. 100 strip 3    Blood Glucose Monitoring Suppl (BLOOD GLUCOSE MONITOR SYSTEM) w/Device KIT 1 kit daily 1 kit 0    carvedilol (COREG) 25 MG tablet Take 1 tablet (25 mg) by mouth 2 times daily (with meals). 60 tablet 11    Continuous Glucose Sensor (FREESTYLE MARTY 2 SENSOR) [facility] USE 1 DEVICE AND CHANGE EVERY 14 DAYS 6 each 3    ELDERBERRY PO Take 1 tablet by mouth daily      fluconazole (DIFLUCAN) 150 MG tablet Take 1 tablet (150 mg) by mouth daily. 6 tablet 1    ibuprofen (ADVIL/MOTRIN) 200 MG tablet Take 400 mg by mouth every 4 hours as needed for mild pain.      ipratropium (ATROVENT HFA) 17 MCG/ACT inhaler Inhale 2 puffs into the lungs every 6 hours as needed for wheezing. PRN 12.9 g 1    magnesium 250 MG tablet Take 1 tablet by mouth daily      metFORMIN (GLUCOPHAGE XR) 500 MG 24 hr tablet TAKE 1 TABLET(500 MG) BY MOUTH DAILY WITH DINNER 90 tablet 3    Probiotic Product (PROBIOTIC DAILY PO)       selenium 50 MCG TABS tablet Take 200 mcg by mouth daily.      VITAMIN D, CHOLECALCIFEROL, PO Take 5,000 Units by mouth daily 1000units with Vitamin C 500mg - 3 tabs am & 2 tabs pm      Zinc 50 MG CAPS       empagliflozin (JARDIANCE) 10 MG TABS tablet Take 1 tablet (10 mg) by mouth daily. (Patient not taking: Reported on 3/13/2025) 30 tablet 0       ALLERGIES     Allergies   Allergen Reactions    Percocet [Oxycodone-Acetaminophen]        PAST  "MEDICAL HISTORY:  Past Medical History:   Diagnosis Date    Abnormal glucose 2/1/17    \"mild DM\" per 2/1/17 lab note-Dr Cornell    Appendicitis 9/2015    Depression     Hepatitis A     HTN (hypertension)     Obese     Osteoarthritis of left knee     Plantar fasciitis     Shingles     SVT (supraventricular tachycardia) 8/15/2012    1/20/2010 SVT ablation    Uterine cancer (H) 3/2010    + washings    Venous dermatitis        PAST SURGICAL HISTORY:  Past Surgical History:   Procedure Laterality Date    ------------OTHER-------------  7-2011    cyst removal - back    CATHETER, ABLATION  1-2010    tachycardia ablation    D & C  3-2010    HYSTERECTOMY RADICAL  3-2010    LAPAROSCOPIC APPENDECTOMY  9/27/2013    Procedure: LAPAROSCOPIC APPENDECTOMY;  LAPAROSCOPIC APPENDECTOMY;  Surgeon: Noreen Brambila MD;  Location:  OR       FAMILY HISTORY:  Family History   Problem Relation Age of Onset    Heart Disease Mother         pacemaker, heart failure    Thyroid Disease Mother     Hypertension Mother     Cancer Father         kidney    Heart Disease Father     Hypertension Father     Thyroid Disease Sister        SOCIAL HISTORY:  Social History     Socioeconomic History    Marital status:      Spouse name: None    Number of children: None    Years of education: None    Highest education level: None   Tobacco Use    Smoking status: Never    Smokeless tobacco: Never   Substance and Sexual Activity    Alcohol use: No    Drug use: No   Other Topics Concern    Caffeine Concern Yes     Comment: 2-3 Diet Coke     Sleep Concern Yes    Stress Concern Yes    Weight Concern Yes    Back Care No    Exercise No    Seat Belt Yes     Social Drivers of Health     Financial Resource Strain: Low Risk  (2/20/2025)    Financial Resource Strain     Within the past 12 months, have you or your family members you live with been unable to get utilities (heat, electricity) when it was really needed?: No   Food Insecurity: Low Risk  (2/20/2025)    " "Food Insecurity     Within the past 12 months, did you worry that your food would run out before you got money to buy more?: No     Within the past 12 months, did the food you bought just not last and you didn t have money to get more?: No   Transportation Needs: Low Risk  (2/20/2025)    Transportation Needs     Within the past 12 months, has lack of transportation kept you from medical appointments, getting your medicines, non-medical meetings or appointments, work, or from getting things that you need?: No   Interpersonal Safety: Not At Risk (7/14/2023)    Received from [facility] and [facility]     IP Custom IPV     Do you feel UNSAFE in any of your personal relationships with your family members or any other acquaintances?: No   Housing Stability: Low Risk  (2/20/2025)    Housing Stability     Do you have housing? : Yes     Are you worried about losing your housing?: No       Review of Systems:  Skin:        Eyes:       ENT:       Respiratory:  Positive for dyspnea on exertion  Cardiovascular:  Negative, chest pain, syncope or near-syncope, cyanosis palpitations, Positive for, fatigue  Gastroenterology:      Genitourinary:       Musculoskeletal:       Neurologic:       Psychiatric:       Heme/Lymph/Imm:       Endocrine:  Positive for diabetes    Physical Exam:  Vitals: Pulse 68   Ht 1.613 m (5' 3.5\")   Wt (!) 179.9 kg (396 lb 11.2 oz)   SpO2 96%   BMI 69.17 kg/m      Constitutional:  cooperative, in no acute distress morbidly obese      Skin:  warm and dry to the touch          Head:  normocephalic        Eyes:  pupils equal and round        Lymph:      ENT:           Neck:  no carotid bruit        Respiratory:  clear to auscultation, normal symmetry         Cardiac: regular rhythm, no murmurs, gallops or rubs detected                  pulses below the femoral arteries are diminished                                      GI:  abdomen soft, no bruits obese      Extremities and Muscular " "Skeletal:      bilateral LE edema     severe lymphedema    Neurological:  no gross motor deficits, affect appropriate        Psych:  Alert and Oriented x 3        Recent Lab Results:  LIPID RESULTS:  Lab Results   Component Value Date    CHOL 121 03/13/2025    CHOL 122 11/10/2022    HDL 37 (A) 03/13/2025    HDL 50 11/10/2022    LDL 72 03/13/2025    LDL 52 11/10/2022    TRIG 60 03/13/2025    TRIG 107 11/10/2022    CHOLHDLRATIO 2.7 11/05/2015       LIVER ENZYME RESULTS:  Lab Results   Component Value Date    AST 24 12/09/2021    ALT 31 12/09/2021       CBC RESULTS:  Lab Results   Component Value Date    WBC 6.1 12/19/2019    WBC 7.5 01/20/2010    RBC 5.22 (A) 12/19/2019    RBC 4.93 01/20/2010    HGB 14.1 02/01/2022    HCT 43.9 02/01/2022    MCV 82 02/01/2022    MCH 26.3 (L) 02/01/2022    MCHC 32.1 02/01/2022    RDW 13.9 02/01/2022     12/19/2019       BMP RESULTS:  Lab Results   Component Value Date     01/04/2024     11/10/2022    POTASSIUM 4.3 01/04/2024    POTASSIUM 4.8 11/10/2022    CHLORIDE 104 01/04/2024    CHLORIDE 103 11/10/2022    CO2 26 01/04/2024    CO2 28 08/13/2020    ANIONGAP 8 01/04/2024    ANIONGAP 2 (L) 08/13/2020     (H) 01/04/2024    GLC 92 11/10/2022    BUN 23.7 (H) 01/04/2024    BUN 22 11/10/2022    BUN 30 (H) 11/10/2022    CR 0.84 01/04/2024    CR 0.74 11/10/2022    GFRESTIMATED 78 01/04/2024    GFRESTIMATED 76 08/13/2020    GFRESTBLACK 88 08/13/2020    ROBYN 9.1 01/04/2024    ROBYN 9.3 11/10/2022        A1C RESULTS:  Lab Results   Component Value Date    A1C 6.6 (A) 03/13/2025       INR RESULTS:  No results found for: \"INR\"        CC  Dennis Narayanan MD  [address] [place] [address]                "

## 2025-03-13 NOTE — LETTER
3/13/2025    Dennis Narayanan MD  [address]  Formerly named [facility] [id number]    RE: Donna Elmore       Dear Colleague,     I had the pleasure of seeing Donna Elmore in the [facility].  HPI and Plan:   Dnona Elmore is a 64 year old female who presents with history of hypertension, SVT with remote AVNRT ablation procedure,, morbid obesity and diabetes.  She had been having increased palpitations recently after starting a higher dose of Ozempic.  Her symptoms persisted despite reducing the dose and so we asked her to wear a heart monitor and follow-up today.  Zio patch monitor showed predominantly sinus rhythm with first-degree AV block but she did have short bursts of both atrial tachycardia and atrial fibrillation, the longest duration about 8 minutes.  She had gone back to her primary care provider and been taken off of Ozempic and started on Jardiance however she started to develop yeast infections with this and subsequently was taken off of Jardiance.  She is now going back to metformin which she is picking up today.  After stopping Ozempic completely she did notice a decrease in her palpitation symptoms except for on Monday she had recurrence.  We spent most of this appointment in education and counseling about atrial fibrillation and her risks giving a [id number] score of 4 and different management strategies  Summary    1.  New onset paroxysmal atrial fibrillation-we talked about potential triggers including increased caffeine intake, uncontrolled hypertension, sleep apnea etc.  I would like to try her on carvedilol for better blood pressure control while still staying on beta-blocker.  She is agreeable so I gave her a new prescription for 25 mg twice daily to take in place of metoprolol for now I cautioned her about the potential side effects.  Her chads Vascor is 4 indicating increased risk for cerebral embolic events related to atrial fibrillation.  However the scoring  mechanism does not take into consideration the duration.  She is fairly symptomatic with her palpitations knowing when her heart is racing and irregular and the heart monitor showed very brief less than 1% burden and short duration of only 8 minutes.  We talked about anticoagulation and right now she is potentially changing insurance companies therefore she does not know the financial burden that the novel agents would cost her.  She is reluctant to warfarin at this time but would consider it.  After discussion we will hold off on anticoagulation for now as she is pretty symptomatic and has very brief and short duration of A-fib episodes.  Will continue to monitor and reassess anticoagulation with her next visit.  2.  Hypertension-her blood pressure is always elevated in clinic, however part of this may be an appropriate size of blood pressure cuff given her arm size.  She is not monitoring her blood pressure at home.  She does have a new wrist cuff which I would like her to use to kind of keep an eye on her blood pressure knowing that the wrist cuffs generally do read a bit higher.  Also her going to switch metoprolol to carvedilol in hopes that her blood pressure will be under better control    Please feel free to contact me with any questions given regards to her care           Today's clinic visit entailed:  Review of external notes as documented elsewhere in note  Review of the result(s) of each unique test - [place]  Prescription drug management    Provider  Link to [facility] Help Grid     The level of medical decision making during this visit was of moderate complexity.    No orders of the defined types were placed in this encounter.    Orders Placed This Encounter   Medications     carvedilol (COREG) 25 MG tablet     Sig: Take 1 tablet (25 mg) by mouth 2 times daily (with meals).     Dispense:  60 tablet     Refill:  11     Medications Discontinued During This Encounter   Medication Reason     metoprolol  succinate ER (TOPROL XL) 100 MG 24 hr tablet          Encounter Diagnoses   Name Primary?     Paroxysmal supraventricular tachycardia      Morbid obesity (H) Yes     Type 2 diabetes mellitus with hyperglycemia, without long-term current use of insulin (H)      Primary hypertension      PAF (paroxysmal atrial fibrillation) (H)        CURRENT MEDICATIONS:  Current Outpatient Medications   Medication Sig Dispense Refill     albuterol (PROVENTIL) (2.5 MG/3ML) 0.083% neb solution Take 1 vial (2.5 mg) by nebulization every 6 hours as needed for shortness of breath, wheezing or cough. 90 mL 0     blood glucose (NO BRAND SPECIFIED) lancets standard Use to test blood sugar 1 times daily or as directed. 100 each 3     blood glucose (NO BRAND SPECIFIED) test strip Use to test blood sugar 1 times daily or as directed. 100 strip 3     Blood Glucose Monitoring Suppl (BLOOD GLUCOSE MONITOR SYSTEM) w/Device KIT 1 kit daily 1 kit 0     carvedilol (COREG) 25 MG tablet Take 1 tablet (25 mg) by mouth 2 times daily (with meals). 60 tablet 11     Continuous Glucose Sensor (FREESTYLE MARTY 2 SENSOR) [facility] [place] 1 DEVICE AND CHANGE EVERY 14 DAYS 6 each 3     ELDERBERRY PO Take 1 tablet by mouth daily       fluconazole (DIFLUCAN) 150 MG tablet Take 1 tablet (150 mg) by mouth daily. 6 tablet 1     ibuprofen (ADVIL/MOTRIN) 200 MG tablet Take 400 mg by mouth every 4 hours as needed for mild pain.       ipratropium (ATROVENT HFA) 17 MCG/ACT inhaler Inhale 2 puffs into the lungs every 6 hours as needed for wheezing. PRN 12.9 g 1     magnesium 250 MG tablet Take 1 tablet by mouth daily       metFORMIN (GLUCOPHAGE XR) 500 MG 24 hr tablet TAKE 1 TABLET(500 MG) BY MOUTH DAILY WITH DINNER 90 tablet 3     Probiotic Product (PROBIOTIC DAILY PO)        selenium 50 MCG TABS tablet Take 200 mcg by mouth daily.       VITAMIN D, CHOLECALCIFEROL, PO Take 5,000 Units by mouth daily 1000units with Vitamin C 500mg - 3 tabs am & 2 tabs pm       Zinc 50 MG CAPS     "    empagliflozin (JARDIANCE) 10 MG TABS tablet Take 1 tablet (10 mg) by mouth daily. (Patient not taking: Reported on 3/13/2025) 30 tablet 0       ALLERGIES     Allergies   Allergen Reactions     Percocet [Oxycodone-Acetaminophen]        PAST MEDICAL HISTORY:  Past Medical History:   Diagnosis Date     Abnormal glucose 2/1/17    \"mild DM\" per 2/1/17 lab note-Dr Cornell     Appendicitis 9/2015     Depression      Hepatitis A      HTN (hypertension)      Obese      Osteoarthritis of left knee      Plantar fasciitis      Shingles      SVT (supraventricular tachycardia) 8/15/2012    1/20/2010 SVT ablation     Uterine cancer (H) 3/2010    + washings     Venous dermatitis        PAST SURGICAL HISTORY:  Past Surgical History:   Procedure Laterality Date     ------------OTHER-------------  7-2011    cyst removal - back     CATHETER, ABLATION  1-2010    tachycardia ablation     D & C  3-2010     HYSTERECTOMY RADICAL  3-2010     LAPAROSCOPIC APPENDECTOMY  9/27/2013    Procedure: LAPAROSCOPIC APPENDECTOMY;  LAPAROSCOPIC APPENDECTOMY;  Surgeon: Noreen Brambila MD;  Location:  OR       FAMILY HISTORY:  Family History   Problem Relation Age of Onset     Heart Disease Mother         pacemaker, heart failure     Thyroid Disease Mother      Hypertension Mother      Cancer Father         kidney     Heart Disease Father      Hypertension Father      Thyroid Disease Sister        SOCIAL HISTORY:  Social History     Socioeconomic History     Marital status:      Spouse name: None     Number of children: None     Years of education: None     Highest education level: None   Tobacco Use     Smoking status: Never     Smokeless tobacco: Never   Substance and Sexual Activity     Alcohol use: No     Drug use: No   Other Topics Concern     Caffeine Concern Yes     Comment: 2-3 Diet Coke      Sleep Concern Yes     Stress Concern Yes     Weight Concern Yes     Back Care No     Exercise No     Seat Belt Yes     Social Drivers of Health " "    Financial Resource Strain: Low Risk  (2/20/2025)    Financial Resource Strain      Within the past 12 months, have you or your family members you live with been unable to get utilities (heat, electricity) when it was really needed?: No   Food Insecurity: Low Risk  (2/20/2025)    Food Insecurity      Within the past 12 months, did you worry that your food would run out before you got money to buy more?: No      Within the past 12 months, did the food you bought just not last and you didn t have money to get more?: No   Transportation Needs: Low Risk  (2/20/2025)    Transportation Needs      Within the past 12 months, has lack of transportation kept you from medical appointments, getting your medicines, non-medical meetings or appointments, work, or from getting things that you need?: No   Interpersonal Safety: Not At Risk (7/14/2023)    Received from [facility] and [facility]     IP Custom IPV      Do you feel UNSAFE in any of your personal relationships with your family members or any other acquaintances?: No   Housing Stability: Low Risk  (2/20/2025)    Housing Stability      Do you have housing? : Yes      Are you worried about losing your housing?: No       Review of Systems:  Skin:        Eyes:       ENT:       Respiratory:  Positive for dyspnea on exertion  Cardiovascular:  Negative, chest pain, syncope or near-syncope, cyanosis palpitations, Positive for, fatigue  Gastroenterology:      Genitourinary:       Musculoskeletal:       Neurologic:       Psychiatric:       Heme/Lymph/Imm:       Endocrine:  Positive for diabetes    Physical Exam:  Vitals: Pulse 68   Ht 1.613 m (5' 3.5\")   Wt (!) 179.9 kg (396 lb 11.2 oz)   SpO2 96%   BMI 69.17 kg/m      Constitutional:  cooperative, in no acute distress morbidly obese      Skin:  warm and dry to the touch          Head:  normocephalic        Eyes:  pupils equal and round        Lymph:      ENT:           Neck:  no carotid bruit    " "    Respiratory:  clear to auscultation, normal symmetry         Cardiac: regular rhythm, no murmurs, gallops or rubs detected                  pulses below the femoral arteries are diminished                                      GI:  abdomen soft, no bruits obese      Extremities and Muscular Skeletal:      bilateral LE edema     severe lymphedema    Neurological:  no gross motor deficits, affect appropriate        Psych:  Alert and Oriented x 3        Recent Lab Results:  LIPID RESULTS:  Lab Results   Component Value Date    CHOL 121 03/13/2025    CHOL 122 11/10/2022    HDL 37 (A) 03/13/2025    HDL 50 11/10/2022    LDL 72 03/13/2025    LDL 52 11/10/2022    TRIG 60 03/13/2025    TRIG 107 11/10/2022    CHOLHDLRATIO 2.7 11/05/2015       LIVER ENZYME RESULTS:  Lab Results   Component Value Date    AST 24 12/09/2021    ALT 31 12/09/2021       CBC RESULTS:  Lab Results   Component Value Date    WBC 6.1 12/19/2019    WBC 7.5 01/20/2010    RBC 5.22 (A) 12/19/2019    RBC 4.93 01/20/2010    HGB 14.1 02/01/2022    HCT 43.9 02/01/2022    MCV 82 02/01/2022    MCH 26.3 (L) 02/01/2022    MCHC 32.1 02/01/2022    RDW 13.9 02/01/2022     12/19/2019       BMP RESULTS:  Lab Results   Component Value Date     01/04/2024     11/10/2022    POTASSIUM 4.3 01/04/2024    POTASSIUM 4.8 11/10/2022    CHLORIDE 104 01/04/2024    CHLORIDE 103 11/10/2022    CO2 26 01/04/2024    CO2 28 08/13/2020    ANIONGAP 8 01/04/2024    ANIONGAP 2 (L) 08/13/2020     (H) 01/04/2024    GLC 92 11/10/2022    BUN 23.7 (H) 01/04/2024    BUN 22 11/10/2022    BUN 30 (H) 11/10/2022    CR 0.84 01/04/2024    CR 0.74 11/10/2022    GFRESTIMATED 78 01/04/2024    GFRESTIMATED 76 08/13/2020    GFRESTBLACK 88 08/13/2020    ROBYN 9.1 01/04/2024    ROBYN 9.3 11/10/2022        A1C RESULTS:  Lab Results   Component Value Date    A1C 6.6 (A) 03/13/2025       INR RESULTS:  No results found for: \"INR\"          Dennis Narayanan MD  [address] [place]  [place] [address]                  Thank you for allowing me to participate in the care of your patient.      Sincerely,     Radha Richardson [facility]  cc:   Dennis Narayanan MD  [address] [place] [address]

## 2025-03-25 ENCOUNTER — MYC MEDICAL ADVICE (OUTPATIENT)
Dept: FAMILY MEDICINE | Facility: CLINIC | Age: 65
End: 2025-03-25

## 2025-03-25 NOTE — TELEPHONE ENCOUNTER
Called patient and discussed her MyChart and constipation.   Okay to continue using the Miralax 1-2 times per day. May repeat the Dulcolax tabs today. Due to body habitus patient is unable to self administer suppository.   Denies pain, nausea or vomiting. States feels intestines moving things along so thinks will have success soon.   Encouraged walking, increasing water intake and more time for bowel meds to work. Discussed signs and symptoms of concern and to follow up if these present.   Alicia Brennan RN

## 2025-03-26 ENCOUNTER — TRANSFERRED RECORDS (OUTPATIENT)
Dept: FAMILY MEDICINE | Facility: CLINIC | Age: 65
End: 2025-03-26

## 2025-03-27 ENCOUNTER — HOSPITAL ENCOUNTER (INPATIENT)
Facility: CLINIC | Age: 65
LOS: 5 days | Discharge: HOME | End: 2025-04-02
Attending: EMERGENCY MEDICINE | Admitting: HOSPITALIST
Payer: COMMERCIAL

## 2025-03-27 ENCOUNTER — TELEPHONE (OUTPATIENT)
Dept: FAMILY MEDICINE | Facility: CLINIC | Age: 65
End: 2025-03-27

## 2025-03-27 DIAGNOSIS — K57.92 DIVERTICULITIS: ICD-10-CM

## 2025-03-27 PROCEDURE — 99285 EMERGENCY DEPT VISIT HI MDM: CPT

## 2025-03-27 ASSESSMENT — COLUMBIA-SUICIDE SEVERITY RATING SCALE - C-SSRS
6. HAVE YOU EVER DONE ANYTHING, STARTED TO DO ANYTHING, OR PREPARED TO DO ANYTHING TO END YOUR LIFE?: NO
1. IN THE PAST MONTH, HAVE YOU WISHED YOU WERE DEAD OR WISHED YOU COULD GO TO SLEEP AND NOT WAKE UP?: NO
2. HAVE YOU ACTUALLY HAD ANY THOUGHTS OF KILLING YOURSELF IN THE PAST MONTH?: NO

## 2025-03-27 ASSESSMENT — ACTIVITIES OF DAILY LIVING (ADL)
ADLS_ACUITY_SCORE: 41
ADLS_ACUITY_SCORE: 41

## 2025-03-28 ENCOUNTER — APPOINTMENT (OUTPATIENT)
Dept: GENERAL RADIOLOGY | Facility: CLINIC | Age: 65
End: 2025-03-28
Attending: EMERGENCY MEDICINE
Payer: COMMERCIAL

## 2025-03-28 ENCOUNTER — APPOINTMENT (OUTPATIENT)
Dept: CT IMAGING | Facility: CLINIC | Age: 65
End: 2025-03-28
Attending: EMERGENCY MEDICINE
Payer: COMMERCIAL

## 2025-03-28 PROBLEM — K57.92 DIVERTICULITIS: Status: ACTIVE | Noted: 2025-03-28

## 2025-03-28 LAB
ALBUMIN SERPL BCG-MCNC: 3.6 G/DL (ref 3.5–5.2)
ALP SERPL-CCNC: 99 U/L (ref 40–150)
ALT SERPL W P-5'-P-CCNC: 24 U/L (ref 0–50)
ANION GAP SERPL CALCULATED.3IONS-SCNC: 10 MMOL/L (ref 7–15)
AST SERPL W P-5'-P-CCNC: 20 U/L (ref 0–45)
BASE EXCESS BLDV CALC-SCNC: 1 MMOL/L (ref -3–3)
BASOPHILS # BLD AUTO: 0 10E3/UL (ref 0–0.2)
BASOPHILS NFR BLD AUTO: 0 %
BILIRUB SERPL-MCNC: 0.6 MG/DL
BUN SERPL-MCNC: 18.9 MG/DL (ref 8–23)
CALCIUM SERPL-MCNC: 8.6 MG/DL (ref 8.8–10.4)
CHLORIDE SERPL-SCNC: 99 MMOL/L (ref 98–107)
CREAT SERPL-MCNC: 0.75 MG/DL (ref 0.51–0.95)
EGFRCR SERPLBLD CKD-EPI 2021: 88 ML/MIN/1.73M2
EOSINOPHIL # BLD AUTO: 0 10E3/UL (ref 0–0.7)
EOSINOPHIL NFR BLD AUTO: 0 %
ERYTHROCYTE [DISTWIDTH] IN BLOOD BY AUTOMATED COUNT: 15.8 % (ref 10–15)
GLUCOSE BLDC GLUCOMTR-MCNC: 130 MG/DL (ref 70–99)
GLUCOSE BLDC GLUCOMTR-MCNC: 151 MG/DL (ref 70–99)
GLUCOSE BLDC GLUCOMTR-MCNC: 156 MG/DL (ref 70–99)
GLUCOSE SERPL-MCNC: 162 MG/DL (ref 70–99)
HCO3 BLDV-SCNC: 26 MMOL/L (ref 21–28)
HCO3 SERPL-SCNC: 23 MMOL/L (ref 22–29)
HCT VFR BLD AUTO: 40.5 % (ref 35–47)
HGB BLD-MCNC: 12.9 G/DL (ref 11.7–15.7)
IMM GRANULOCYTES # BLD: 0.1 10E3/UL
IMM GRANULOCYTES NFR BLD: 0 %
LACTATE BLD-SCNC: 1 MMOL/L (ref 0.7–2)
LIPASE SERPL-CCNC: 33 U/L (ref 13–60)
LYMPHOCYTES # BLD AUTO: 1.6 10E3/UL (ref 0.8–5.3)
LYMPHOCYTES NFR BLD AUTO: 11 %
MCH RBC QN AUTO: 25.6 PG (ref 26.5–33)
MCHC RBC AUTO-ENTMCNC: 31.9 G/DL (ref 31.5–36.5)
MCV RBC AUTO: 80 FL (ref 78–100)
MONOCYTES # BLD AUTO: 1.7 10E3/UL (ref 0–1.3)
MONOCYTES NFR BLD AUTO: 11 %
NEUTROPHILS # BLD AUTO: 11.5 10E3/UL (ref 1.6–8.3)
NEUTROPHILS NFR BLD AUTO: 77 %
NRBC # BLD AUTO: 0 10E3/UL
NRBC BLD AUTO-RTO: 0 /100
PCO2 BLDV: 41 MM HG (ref 40–50)
PH BLDV: 7.41 [PH] (ref 7.32–7.43)
PLAT MORPH BLD: ABNORMAL
PLATELET # BLD AUTO: ABNORMAL 10*3/UL
PO2 BLDV: 48 MM HG (ref 25–47)
POTASSIUM SERPL-SCNC: 4.3 MMOL/L (ref 3.4–5.3)
PROT SERPL-MCNC: 6.7 G/DL (ref 6.4–8.3)
RBC # BLD AUTO: 5.04 10E6/UL (ref 3.8–5.2)
RBC MORPH BLD: ABNORMAL
SAO2 % BLDV: 83 % (ref 70–75)
SODIUM SERPL-SCNC: 132 MMOL/L (ref 135–145)
TOXIC GRANULES BLD QL SMEAR: PRESENT
WBC # BLD AUTO: 15 10E3/UL (ref 4–11)

## 2025-03-28 PROCEDURE — 83605 ASSAY OF LACTIC ACID: CPT

## 2025-03-28 PROCEDURE — 258N000003 HC RX IP 258 OP 636: Performed by: HOSPITALIST

## 2025-03-28 PROCEDURE — 85004 AUTOMATED DIFF WBC COUNT: CPT | Performed by: EMERGENCY MEDICINE

## 2025-03-28 PROCEDURE — 250N000012 HC RX MED GY IP 250 OP 636 PS 637: Performed by: HOSPITALIST

## 2025-03-28 PROCEDURE — 250N000013 HC RX MED GY IP 250 OP 250 PS 637: Performed by: HOSPITALIST

## 2025-03-28 PROCEDURE — 250N000011 HC RX IP 250 OP 636: Performed by: EMERGENCY MEDICINE

## 2025-03-28 PROCEDURE — 83690 ASSAY OF LIPASE: CPT | Performed by: EMERGENCY MEDICINE

## 2025-03-28 PROCEDURE — 120N000001 HC R&B MED SURG/OB

## 2025-03-28 PROCEDURE — 74177 CT ABD & PELVIS W/CONTRAST: CPT

## 2025-03-28 PROCEDURE — 82803 BLOOD GASES ANY COMBINATION: CPT

## 2025-03-28 PROCEDURE — 36415 COLL VENOUS BLD VENIPUNCTURE: CPT | Performed by: EMERGENCY MEDICINE

## 2025-03-28 PROCEDURE — 99223 1ST HOSP IP/OBS HIGH 75: CPT | Performed by: HOSPITALIST

## 2025-03-28 PROCEDURE — 99207 PR APP CREDIT; MD BILLING SHARED VISIT: CPT | Mod: FS

## 2025-03-28 PROCEDURE — 80053 COMPREHEN METABOLIC PANEL: CPT | Performed by: EMERGENCY MEDICINE

## 2025-03-28 PROCEDURE — 250N000009 HC RX 250: Performed by: EMERGENCY MEDICINE

## 2025-03-28 PROCEDURE — 250N000011 HC RX IP 250 OP 636: Mod: JZ | Performed by: HOSPITALIST

## 2025-03-28 PROCEDURE — 99254 IP/OBS CNSLTJ NEW/EST MOD 60: CPT | Mod: FS | Performed by: COLON & RECTAL SURGERY

## 2025-03-28 PROCEDURE — 85014 HEMATOCRIT: CPT | Performed by: EMERGENCY MEDICINE

## 2025-03-28 PROCEDURE — 250N000009 HC RX 250

## 2025-03-28 PROCEDURE — 250N000013 HC RX MED GY IP 250 OP 250 PS 637

## 2025-03-28 PROCEDURE — 71045 X-RAY EXAM CHEST 1 VIEW: CPT

## 2025-03-28 RX ORDER — CEFTRIAXONE 2 G/1
2 INJECTION, POWDER, FOR SOLUTION INTRAMUSCULAR; INTRAVENOUS ONCE
Status: COMPLETED | OUTPATIENT
Start: 2025-03-28 | End: 2025-03-28

## 2025-03-28 RX ORDER — SIMETHICONE 80 MG
80 TABLET,CHEWABLE ORAL EVERY 6 HOURS PRN
Status: DISCONTINUED | OUTPATIENT
Start: 2025-03-28 | End: 2025-04-02 | Stop reason: HOSPADM

## 2025-03-28 RX ORDER — SCOPOLAMINE 1 MG/3D
1 PATCH, EXTENDED RELEASE TRANSDERMAL
Status: DISCONTINUED | OUTPATIENT
Start: 2025-03-28 | End: 2025-03-30

## 2025-03-28 RX ORDER — MULTIVITAMIN WITH IRON
250 TABLET ORAL AT BEDTIME
Status: DISCONTINUED | OUTPATIENT
Start: 2025-03-28 | End: 2025-03-28

## 2025-03-28 RX ORDER — METOPROLOL SUCCINATE 100 MG/1
100 TABLET, EXTENDED RELEASE ORAL DAILY
Status: DISCONTINUED | OUTPATIENT
Start: 2025-03-28 | End: 2025-04-02 | Stop reason: HOSPADM

## 2025-03-28 RX ORDER — LIDOCAINE 40 MG/G
CREAM TOPICAL
Status: DISCONTINUED | OUTPATIENT
Start: 2025-03-28 | End: 2025-04-02 | Stop reason: HOSPADM

## 2025-03-28 RX ORDER — ONDANSETRON 4 MG/1
4 TABLET, ORALLY DISINTEGRATING ORAL EVERY 6 HOURS PRN
Status: DISCONTINUED | OUTPATIENT
Start: 2025-03-28 | End: 2025-04-02 | Stop reason: HOSPADM

## 2025-03-28 RX ORDER — NICOTINE POLACRILEX 4 MG
15-30 LOZENGE BUCCAL
Status: DISCONTINUED | OUTPATIENT
Start: 2025-03-28 | End: 2025-04-02 | Stop reason: HOSPADM

## 2025-03-28 RX ORDER — SIMETHICONE 80 MG
80 TABLET,CHEWABLE ORAL EVERY 6 HOURS PRN
COMMUNITY

## 2025-03-28 RX ORDER — NALOXONE HYDROCHLORIDE 0.4 MG/ML
0.2 INJECTION, SOLUTION INTRAMUSCULAR; INTRAVENOUS; SUBCUTANEOUS
Status: DISCONTINUED | OUTPATIENT
Start: 2025-03-28 | End: 2025-04-02 | Stop reason: HOSPADM

## 2025-03-28 RX ORDER — METRONIDAZOLE 500 MG/100ML
500 INJECTION, SOLUTION INTRAVENOUS EVERY 12 HOURS
Status: DISCONTINUED | OUTPATIENT
Start: 2025-03-28 | End: 2025-03-30

## 2025-03-28 RX ORDER — IOPAMIDOL 755 MG/ML
135 INJECTION, SOLUTION INTRAVASCULAR ONCE
Status: COMPLETED | OUTPATIENT
Start: 2025-03-28 | End: 2025-03-28

## 2025-03-28 RX ORDER — HYDRALAZINE HYDROCHLORIDE 20 MG/ML
10 INJECTION INTRAMUSCULAR; INTRAVENOUS EVERY 4 HOURS PRN
Status: DISCONTINUED | OUTPATIENT
Start: 2025-03-28 | End: 2025-04-02 | Stop reason: HOSPADM

## 2025-03-28 RX ORDER — METRONIDAZOLE 500 MG/100ML
500 INJECTION, SOLUTION INTRAVENOUS ONCE
Status: COMPLETED | OUTPATIENT
Start: 2025-03-28 | End: 2025-03-28

## 2025-03-28 RX ORDER — HYDROMORPHONE HCL IN WATER/PF 6 MG/30 ML
0.2 PATIENT CONTROLLED ANALGESIA SYRINGE INTRAVENOUS
Status: DISCONTINUED | OUTPATIENT
Start: 2025-03-28 | End: 2025-04-02 | Stop reason: HOSPADM

## 2025-03-28 RX ORDER — AMOXICILLIN 250 MG
1 CAPSULE ORAL 2 TIMES DAILY PRN
Status: DISCONTINUED | OUTPATIENT
Start: 2025-03-28 | End: 2025-04-02 | Stop reason: HOSPADM

## 2025-03-28 RX ORDER — CEFTRIAXONE 2 G/1
2 INJECTION, POWDER, FOR SOLUTION INTRAMUSCULAR; INTRAVENOUS EVERY 24 HOURS
Status: DISCONTINUED | OUTPATIENT
Start: 2025-03-29 | End: 2025-03-30

## 2025-03-28 RX ORDER — CALCIUM CARBONATE 500 MG/1
1000 TABLET, CHEWABLE ORAL 4 TIMES DAILY PRN
Status: DISCONTINUED | OUTPATIENT
Start: 2025-03-28 | End: 2025-04-02 | Stop reason: HOSPADM

## 2025-03-28 RX ORDER — ONDANSETRON 2 MG/ML
4 INJECTION INTRAMUSCULAR; INTRAVENOUS EVERY 6 HOURS PRN
Status: DISCONTINUED | OUTPATIENT
Start: 2025-03-28 | End: 2025-04-02 | Stop reason: HOSPADM

## 2025-03-28 RX ORDER — NALOXONE HYDROCHLORIDE 0.4 MG/ML
0.4 INJECTION, SOLUTION INTRAMUSCULAR; INTRAVENOUS; SUBCUTANEOUS
Status: DISCONTINUED | OUTPATIENT
Start: 2025-03-28 | End: 2025-04-02 | Stop reason: HOSPADM

## 2025-03-28 RX ORDER — DEXTROSE MONOHYDRATE 25 G/50ML
25-50 INJECTION, SOLUTION INTRAVENOUS
Status: DISCONTINUED | OUTPATIENT
Start: 2025-03-28 | End: 2025-04-02 | Stop reason: HOSPADM

## 2025-03-28 RX ORDER — NICOTINE POLACRILEX 4 MG
15-30 LOZENGE BUCCAL
Status: DISCONTINUED | OUTPATIENT
Start: 2025-03-28 | End: 2025-03-28

## 2025-03-28 RX ORDER — DEXTROSE MONOHYDRATE 25 G/50ML
25-50 INJECTION, SOLUTION INTRAVENOUS
Status: DISCONTINUED | OUTPATIENT
Start: 2025-03-28 | End: 2025-03-28

## 2025-03-28 RX ORDER — HYDRALAZINE HYDROCHLORIDE 10 MG/1
10 TABLET, FILM COATED ORAL EVERY 4 HOURS PRN
Status: DISCONTINUED | OUTPATIENT
Start: 2025-03-28 | End: 2025-04-02 | Stop reason: HOSPADM

## 2025-03-28 RX ORDER — HYDROMORPHONE HCL IN WATER/PF 6 MG/30 ML
0.4 PATIENT CONTROLLED ANALGESIA SYRINGE INTRAVENOUS
Status: DISCONTINUED | OUTPATIENT
Start: 2025-03-28 | End: 2025-04-02 | Stop reason: HOSPADM

## 2025-03-28 RX ORDER — SODIUM CHLORIDE 9 MG/ML
INJECTION, SOLUTION INTRAVENOUS CONTINUOUS
Status: DISCONTINUED | OUTPATIENT
Start: 2025-03-28 | End: 2025-03-29

## 2025-03-28 RX ORDER — SIMETHICONE 80 MG
80 TABLET,CHEWABLE ORAL EVERY 6 HOURS PRN
Status: DISCONTINUED | OUTPATIENT
Start: 2025-03-28 | End: 2025-03-28

## 2025-03-28 RX ORDER — AMOXICILLIN 250 MG
2 CAPSULE ORAL 2 TIMES DAILY PRN
Status: DISCONTINUED | OUTPATIENT
Start: 2025-03-28 | End: 2025-04-02 | Stop reason: HOSPADM

## 2025-03-28 RX ORDER — METOPROLOL SUCCINATE 100 MG/1
100 TABLET, EXTENDED RELEASE ORAL DAILY
COMMUNITY

## 2025-03-28 RX ORDER — POLYETHYLENE GLYCOL 3350 17 G/17G
17 POWDER, FOR SOLUTION ORAL 2 TIMES DAILY
Status: DISCONTINUED | OUTPATIENT
Start: 2025-03-28 | End: 2025-03-30

## 2025-03-28 RX ADMIN — POLYETHYLENE GLYCOL 3350 17 G: 17 POWDER, FOR SOLUTION ORAL at 21:31

## 2025-03-28 RX ADMIN — IOPAMIDOL 135 ML: 755 INJECTION, SOLUTION INTRAVENOUS at 02:01

## 2025-03-28 RX ADMIN — METRONIDAZOLE 500 MG: 500 INJECTION, SOLUTION INTRAVENOUS at 04:12

## 2025-03-28 RX ADMIN — CALCIUM CARBONATE (ANTACID) CHEW TAB 500 MG 1000 MG: 500 CHEW TAB at 05:44

## 2025-03-28 RX ADMIN — METRONIDAZOLE 500 MG: 500 INJECTION, SOLUTION INTRAVENOUS at 17:37

## 2025-03-28 RX ADMIN — POLYETHYLENE GLYCOL 3350 17 G: 17 POWDER, FOR SOLUTION ORAL at 15:30

## 2025-03-28 RX ADMIN — SIMETHICONE 80 MG: 80 TABLET, CHEWABLE ORAL at 21:43

## 2025-03-28 RX ADMIN — ONDANSETRON 4 MG: 4 TABLET, ORALLY DISINTEGRATING ORAL at 05:44

## 2025-03-28 RX ADMIN — METOPROLOL SUCCINATE 100 MG: 100 TABLET, EXTENDED RELEASE ORAL at 13:55

## 2025-03-28 RX ADMIN — SCOPOLAMINE 1 PATCH: 1.5 PATCH, EXTENDED RELEASE TRANSDERMAL at 13:36

## 2025-03-28 RX ADMIN — HYDROMORPHONE HYDROCHLORIDE 0.4 MG: 0.2 INJECTION, SOLUTION INTRAMUSCULAR; INTRAVENOUS; SUBCUTANEOUS at 21:43

## 2025-03-28 RX ADMIN — SODIUM CHLORIDE 79 ML: 9 INJECTION, SOLUTION INTRAVENOUS at 02:01

## 2025-03-28 RX ADMIN — CEFTRIAXONE SODIUM 2 G: 2 INJECTION, POWDER, FOR SOLUTION INTRAMUSCULAR; INTRAVENOUS at 04:12

## 2025-03-28 RX ADMIN — SIMETHICONE 80 MG: 80 TABLET, CHEWABLE ORAL at 13:55

## 2025-03-28 RX ADMIN — MAGNESIUM HYDROXIDE 30 ML: 400 SUSPENSION ORAL at 13:55

## 2025-03-28 RX ADMIN — SODIUM CHLORIDE: 0.9 INJECTION, SOLUTION INTRAVENOUS at 11:20

## 2025-03-28 RX ADMIN — INSULIN ASPART 1 UNITS: 100 INJECTION, SOLUTION INTRAVENOUS; SUBCUTANEOUS at 17:43

## 2025-03-28 ASSESSMENT — ACTIVITIES OF DAILY LIVING (ADL)
ADLS_ACUITY_SCORE: 41
ADLS_ACUITY_SCORE: 41
ADLS_ACUITY_SCORE: 35
ADLS_ACUITY_SCORE: 42
ADLS_ACUITY_SCORE: 41
ADLS_ACUITY_SCORE: 41
ADLS_ACUITY_SCORE: 35
ADLS_ACUITY_SCORE: 41
ADLS_ACUITY_SCORE: 35
ADLS_ACUITY_SCORE: 41
ADLS_ACUITY_SCORE: 38
ADLS_ACUITY_SCORE: 41
ADLS_ACUITY_SCORE: 35
ADLS_ACUITY_SCORE: 38
ADLS_ACUITY_SCORE: 35
ADLS_ACUITY_SCORE: 41
ADLS_ACUITY_SCORE: 35
ADLS_ACUITY_SCORE: 35
ADLS_ACUITY_SCORE: 38
ADLS_ACUITY_SCORE: 41
ADLS_ACUITY_SCORE: 38
ADLS_ACUITY_SCORE: 41
ADLS_ACUITY_SCORE: 42

## 2025-03-28 NOTE — PROGRESS NOTES
RECEIVING UNIT ED HANDOFF REVIEW    ED Nurse Handoff Report was reviewed by: Paramjit Daniel RN on March 28, 2025 at 10:02 AM

## 2025-03-28 NOTE — ED PROVIDER NOTES
Emergency Department Note      History of Present Illness     Chief Complaint   Constipation and Fever    HPI   Donna Elmore is a 65 year old female with history of hypertension and type 2 diabetes who presents to the ED for evaluation of constipation and a fever. The patient reports that prior to today she was constipated for 10 days. She was seen at an ED in [place] yesterday and got a CT of the abdomen which showed some inflammation. Today, she's had a total of 3 loose bowel movements, with one of the episodes being in the waiting room. She also has had a lot of abdominal discomfort, flatulence, and spike a fever of 100.5 at home today. Because of these symptoms, she called her clinic who told her she had an elevated white count from her ED visit yesterday. Her clinic was concerned for colitis and advised the patient present to the ED for further evaluation.    Patient also provides additional information regarding her recent cardiology and primary visits. She states that she was on Ozempic for 3 years, but this caused palpitations. She saw her cardiologist, Dr. Richardson, who recommended she switched from Ozempic to Jardiance. Patient made the switch and was on Jardiance for a month, but got 2 yeast infections while taking this medication. She then saw her primary, Dr. Narayanan, in the middle of March who decided to stop the Jardiance and put the patient back on metformin. Her primary also prescribed Diflucan for the yeast infections and she finished this about a week ago. Denies dysuria, frequency, or urgency. She also reports that Dr. Richardson wanted to switch her blood pressure medication from metoprolol to carvedilol to see if that would help the patient's newly diagnosed a-fib before trying blood thinners. Patient started taking the carvedilol and had some side effects, so she stopped taking it. Notes that her a-fib comes and goes.    Independent Historian   None    Review of External Notes   None    Past  Medical History     Medical History and Problem List   Anemia  Appendicitis  Depression  Hepatitis A  Hypertension  Obesity  Osteoarthritis  Plantar fasciitis  Shingles  SVT  Type 2 diabetes   Uterine cancer  Venous dermatitis    Medications   Albuterol   Atrovent  Coreg   Cozaar   Deltasone   Diflucan  Glucophage   Lasix   Lopressor   Toprol   Zoloft     Surgical History   Cardiac ablation  Cyst removal, back  Dilation and curettage  EP study/ablation   Exploratory laparotomy, peritoneal cytology, modified radical hysterectomy, bilateral salpingo-oophorectomy, bilateral pelvic and paraaortic lymph node sampling   Laparoscopic appendectomy     Physical Exam     Patient Vitals for the past 24 hrs:   BP Temp Temp src Pulse Resp SpO2   03/28/25 0306 (!) 189/86 -- -- -- -- --   03/27/25 2145 (!) 176/84 99  F (37.2  C) Oral 82 18 95 %     Physical Exam  Eyes:  The pupils are equal and round    Conjunctivae and sclerae are normal  ENT:    The nose is normal    Pinnae are normal  CV:  Regular rate and rhythm     No edema  Resp:  Lungs are clear    Non-labored    No rales    No wheezing   GI:  Abdomen is soft with tenderness of the left abdomen, there is no rigidity    No distension    No rebound tenderness   MS:  Normal muscular tone    No asymmetric leg swelling  Skin:  No rash or acute skin lesions noted  Neuro:   Awake, alert.      Speech is normal and fluent.    Face is symmetric.     Moves all extremities    Diagnostics     Lab Results   Labs Ordered and Resulted from Time of ED Arrival to Time of ED Departure   COMPREHENSIVE METABOLIC PANEL - Abnormal       Result Value    Sodium 132 (*)     Potassium 4.3      Carbon Dioxide (CO2) 23      Anion Gap 10      Urea Nitrogen 18.9      Creatinine 0.75      GFR Estimate 88      Calcium 8.6 (*)     Chloride 99      Glucose 162 (*)     Alkaline Phosphatase 99      AST 20      ALT 24      Protein Total 6.7      Albumin 3.6      Bilirubin Total 0.6     CBC WITH PLATELETS AND  DIFFERENTIAL - Abnormal    WBC Count 15.0 (*)     RBC Count 5.04      Hemoglobin 12.9      Hematocrit 40.5      MCV 80      MCH 25.6 (*)     MCHC 31.9      RDW 15.8 (*)     Platelet Count        % Neutrophils 77      % Lymphocytes 11      % Monocytes 11      % Eosinophils 0      % Basophils 0      % Immature Granulocytes 0      NRBCs per 100 WBC 0      Absolute Neutrophils 11.5 (*)     Absolute Lymphocytes 1.6      Absolute Monocytes 1.7 (*)     Absolute Eosinophils 0.0      Absolute Basophils 0.0      Absolute Immature Granulocytes 0.1      Absolute NRBCs 0.0     ISTAT GASES LACTATE VENOUS POCT - Abnormal    Lactic Acid POCT 1.0      Bicarbonate Venous POCT 26      O2 Sat, Venous POCT 83 (*)     pCO2 Venous POCT 41      pH Venous POCT 7.41      pO2 Venous POCT 48 (*)     Base Excess/Deficit (+/-) POCT 1.0     RBC AND PLATELET MORPHOLOGY - Abnormal    RBC Morphology Confirmed RBC Indices      Platelet Assessment Platelets Clumped (*)     Toxic Neutrophils Present (*)    LIPASE - Normal    Lipase 33       Imaging   XR Chest Port 1 View   Final Result   IMPRESSION: Heart size at upper limits normal. Left lung is clear. Nodular opacity in the right lower lobe corresponding to clusters of partially calcified granulomas as seen on the exam from 4/27/2017. Left lung is clear. No pneumothorax.      CT Abdomen Pelvis w Contrast   Final Result   IMPRESSION:    1.  Interval development of mild changes of acute sigmoid diverticulitis, without secondary complications. Formed stool material and gas within normal-caliber redundant colon, more apparent in the rectosigmoid. No mechanical obstruction or free gas.    Interval appendectomy.      2.  Diffusely fatty infiltrated enlarged liver without discrete lesion, unchanged. Cholelithiasis without biliary dilatation or adjacent inflammation, interval enlargement of gallstones previously demonstrated.      3.  Partially visualized nodular right lower lobe infiltrate, likely  representing an infectious or an inflammatory process.          Independent Interpretation   None    ED Course      Medications Administered   Medications   iopamidol (ISOVUE-370) solution 135 mL (has no administration in time range)   Saline Flush - CT (has no administration in time range)     Procedures   Procedures     Discussion of Management   Admitting Hospitalist, Dr. Luz    ED Course   ED Course as of 03/28/25 0156   Fri Mar 28, 2025   0033 I obtained history and examined the patient as noted above.      Additional Documentation  None    Medical Decision Making / Diagnosis         MIPS       None    MDM   Donna Elmore is a 65 year old female who presents to the emergency department with abdominal pain.  She was seen and had CT scan performed yesterday which showed possible colitis.  She developed a fever today and so came here to the ER.  White blood cell count is elevated.  Lactic acid level is normal.  CT scan was repeated as she has a history of diabetes and has obesity.  CT scan shows diverticulitis today.  Given her age, obesity, and diabetes recommended admission with IV antibiotics in light of her fever.  She was comfortable with plan.  She is given antibiotics and plan for admission.  I spoke with the hospitalist who agreed to accept the patient.    Disposition   The patient was admitted to the hospital.     Diagnosis     ICD-10-CM    1. Diverticulitis  K57.92            Discharge Medications   New Prescriptions    No medications on file     Scribe Disclosure:  ELEN SANCHEZ, am serving as a scribe at 12:25 AM on 3/28/2025 to document services personally performed by Sea Medina MD based on my observations and the provider's statements to me.      Sea Medina MD  03/28/25 [id number]

## 2025-03-28 NOTE — PROGRESS NOTES
MD Notification    Notified Person: MD    Notified Person Name:Emily Giordano MD    Notification Date/Time:o3/28/2025 at 1447    Notification Interaction:Vocera    Purpose of Notification:Pt has moist, red folds and two small wounds on the inner right thigh, which have been bothering her.    Orders Received:WOC&Miconazole    Comments:

## 2025-03-28 NOTE — ED NOTES
[facility]  ED Nurse Handoff Report    ED Chief complaint: Constipation and Fever      ED Diagnosis:   Final diagnoses:   Diverticulitis       Code Status: Full Code    Allergies:   Allergies   Allergen Reactions    Percocet [Oxycodone-Acetaminophen]        Patient Story: Patient presents with constipation and fever.  She was seen in ED a few days ago and prescribed miralax and stool softeners.  She had a few small bowel movements, then developed a fever yesterday.  Focused Assessment:  CT shows diverticulitis.  She is afebrile in ED.  Abnormal Labs Resulted from Time of ED Arrival to Time of ED Departure   COMPREHENSIVE METABOLIC PANEL - Abnormal       Result Value    Sodium 132 (*)     Potassium 4.3      Carbon Dioxide (CO2) 23      Anion Gap 10      Urea Nitrogen 18.9      Creatinine 0.75      GFR Estimate 88      Calcium 8.6 (*)     Chloride 99      Glucose 162 (*)     Alkaline Phosphatase 99      AST 20      ALT 24      Protein Total 6.7      Albumin 3.6      Bilirubin Total 0.6     CBC WITH PLATELETS AND DIFFERENTIAL - Abnormal    WBC Count 15.0 (*)     RBC Count 5.04      Hemoglobin 12.9      Hematocrit 40.5      MCV 80      MCH 25.6 (*)     MCHC 31.9      RDW 15.8 (*)     Platelet Count        % Neutrophils 77      % Lymphocytes 11      % Monocytes 11      % Eosinophils 0      % Basophils 0      % Immature Granulocytes 0      NRBCs per 100 WBC 0      Absolute Neutrophils 11.5 (*)     Absolute Lymphocytes 1.6      Absolute Monocytes 1.7 (*)     Absolute Eosinophils 0.0      Absolute Basophils 0.0      Absolute Immature Granulocytes 0.1      Absolute NRBCs 0.0     ISTAT GASES LACTATE VENOUS POCT - Abnormal    Lactic Acid POCT 1.0      Bicarbonate Venous POCT 26      O2 Sat, Venous POCT 83 (*)     pCO2 Venous POCT 41      pH Venous POCT 7.41      pO2 Venous POCT 48 (*)     Base Excess/Deficit (+/-) POCT 1.0     RBC AND PLATELET MORPHOLOGY - Abnormal    RBC Morphology Confirmed RBC Indices       Platelet Assessment Platelets Clumped (*)     Toxic Neutrophils Present (*)          Treatments and/or interventions provided: IVF, IV antibiotics  Patient's response to treatments and/or interventions: Tolerated well.    To be done/followed up on inpatient unit:  Observation, antibiotics    Does this patient have any cognitive concerns?:  none    Activity level - Baseline/Home:  Independent and Cane  Activity Level - Current:   Independent and Cane    Patient's Preferred language: English   Needed?: No    Isolation: None  Infection: Not Applicable  Patient tested for COVID 19 prior to admission: NO  Bariatric?: Yes    Vital Signs:   Vitals:    03/27/25 2145 03/28/25 0306   BP: (!) 176/84 (!) 189/86   Pulse: 82    Resp: 18    Temp: 99  F (37.2  C)    TempSrc: Oral    SpO2: 95%        Cardiac Rhythm:     Was the PSS-3 completed:   Yes  What interventions are required if any?               Family Comments: None  OBS brochure/video discussed/provided to patient/family: No              Name of person given brochure if not patient: na              Relationship to patient: na    For the majority of the shift this patient's behavior was Green.   Behavioral interventions performed were None.    ED NURSE PHONE NUMBER: [phone]

## 2025-03-28 NOTE — PHARMACY-ADMISSION MEDICATION HISTORY
Pharmacist Admission Medication History    Admission medication history is complete. The information provided in this note is only as accurate as the sources available at the time of the update.    Information Source(s): Patient via in-person    Pertinent Information: Pt switching to carvedilol but is currently finishing her metoprolol rx before she does    Changes made to PTA medication list:  Added: metoprolol (temporarily), simethicone, nasal saline  Deleted: albuterol neb, jardiance, fluconazole (finished a week ago), selenium, vitamin C  Changed: metformin from dinner to breakfast    Allergies reviewed with patient and updates made in EHR: yes    Medication History Completed By: Frannie Feliciano [facility] 3/28/2025 8:58 AM    PTA Med List   Medication Sig Last Dose/Taking    ELDERBERRY PO Take 1 tablet by mouth daily 3/27/2025    ibuprofen (ADVIL/MOTRIN) 200 MG tablet Take 400 mg by mouth every 4 hours as needed for mild pain. Taking As Needed    ipratropium (ATROVENT HFA) 17 MCG/ACT inhaler Inhale 2 puffs into the lungs every 6 hours as needed for wheezing. PRN Taking As Needed    magnesium 250 MG tablet Take 1 tablet by mouth at bedtime. 3/26/2025    metFORMIN (GLUCOPHAGE XR) 500 MG 24 hr tablet TAKE 1 TABLET(500 MG) BY MOUTH DAILY WITH DINNER (Patient taking differently: Take 500 mg by mouth every morning.) 3/27/2025 Morning    metoprolol succinate ER (TOPROL XL) 100 MG 24 hr tablet Take 100 mg by mouth daily. Finishing Rx then will switch to carvedilol 3/27/2025 Morning    Probiotic Product (PROBIOTIC DAILY PO) Take 1 tablet by mouth every evening. 3/26/2025    simethicone (MYLICON) 80 MG chewable tablet Take 80 mg by mouth every 6 hours as needed for flatulence or cramping. Taking As Needed    sodium chloride (OCEAN) 0.65 % nasal spray Spray 1 spray into both nostrils daily as needed for congestion. Taking As Needed    VITAMIN D, CHOLECALCIFEROL, PO Take 1,000 Units by mouth daily. 3/27/2025    Zinc 50 MG  CAPS Take 1 tablet by mouth daily. 3/27/2025

## 2025-03-28 NOTE — H&P
"[facility]  Hospitalist History and Physical  Date of Admission:  3/27/2025    Primary Care Physician   Dennis Narayanan    Chief Complaint  Constipation and Fever    History obtained from the patient    History of Present Illness   Donna Elmore is a 65 year old female with a past medical history of htn, svt s/p ablation, paroxysmal afib, morbid obesity, diabetes presents to the hospital with fevers and constipation.  The patient reports that she has been constipated for approximately 10 days starting after she took some immodium for diarrhea. Since then she started to get distended and has felt the urge to defecate, but has not been able to pass stool. The patient tried miralax and senna without improvement of symptoms. She then went to the emergency room at an outside hospital and underwent CT which revealed moderate stool and a focal area of colitis. She was discharged home on mag citrate. On Thursday 3/27 the patient started having bowel movements again, but she the  developed a fever of 100.5F. She talked to her clinic who then advised her to present to the er.     Past Medical History    I have reviewed this patient's medical history and updated it with pertinent information if needed.   Past Medical History:   Diagnosis Date    Abnormal glucose 2/1/17    \"mild DM\" per 2/1/17 lab note-Dr Cornell    Appendicitis 9/2015    Depression     Hepatitis A     HTN (hypertension)     Obese     Osteoarthritis of left knee     Plantar fasciitis     Shingles     SVT (supraventricular tachycardia) 8/15/2012    1/20/2010 SVT ablation    Uterine cancer (H) 3/2010    + washings    Venous dermatitis        Past Surgical History   I have reviewed this patient's surgical history and updated it with pertinent information if needed.  Past Surgical History:   Procedure Laterality Date    ------------OTHER-------------  7-2011    cyst removal - back    CATHETER, ABLATION  1-2010    tachycardia ablation "    D & C  3-2010    HYSTERECTOMY RADICAL  3-2010    LAPAROSCOPIC APPENDECTOMY  9/27/2013    Procedure: LAPAROSCOPIC APPENDECTOMY;  LAPAROSCOPIC APPENDECTOMY;  Surgeon: Noreen Brambila MD;  Location:  OR       Allergies   Allergies   Allergen Reactions    Percocet [Oxycodone-Acetaminophen]        Social History   I have reviewed this patient's social history and updated it with pertinent information if needed. Donna Elmore  reports that she has never smoked. She has never used smokeless tobacco. She reports that she does not drink alcohol and does not use drugs.    Family History   I have reviewed this patient's family history and updated it with pertinent information if needed.   Family History   Problem Relation Age of Onset    Heart Disease Mother         pacemaker, heart failure    Thyroid Disease Mother     Hypertension Mother     Cancer Father         kidney    Heart Disease Father     Hypertension Father     Thyroid Disease Sister        Physical Exam   Temp: 99  F (37.2  C) Temp src: Oral BP: (!) 189/86 Pulse: 82   Resp: 18 SpO2: 95 % O2 Device: None (Room air)    Vital Signs with Ranges  Temp:  [99  F (37.2  C)] 99  F (37.2  C)  Pulse:  [82] 82  Resp:  [18] 18  BP: (176-189)/(84-86) 189/86  SpO2:  [95 %] 95 %  0 lbs 0 oz  Physical Exam  Vitals reviewed.   Constitutional:       Appearance: She is obese.      Comments: A very pleasant lady seen resting in bed comfortably in no apparent distress in the er.    HENT:      Head: Normocephalic and atraumatic.   Cardiovascular:      Rate and Rhythm: Normal rate and regular rhythm.   Pulmonary:      Effort: Pulmonary effort is normal.      Breath sounds: Normal breath sounds.   Abdominal:      General: Bowel sounds are normal.      Palpations: Abdomen is soft.      Tenderness: There is abdominal tenderness.      Comments: Periumbilical tenderness without rebound or guarding   Musculoskeletal:         General: Swelling present.      Comments: Bilateral lower  extremity swelling   Neurological:      General: No focal deficit present.      Mental Status: She is alert and oriented to person, place, and time. Mental status is at baseline.         Assessment & Plan   Donna Elmore is a 65 year old female with a past medical history of htn, svt s/p ablation, paroxysmal afib, morbid obesity, diabetes presents to the hospital with acute diverticulitis.     Acute sigmoid diverticulitis  Patient presenting with fevers and constipation found to have sigmoid diverticulitis on ct.   Ivf  C/w ceftriaxone and flagyl  Colorectal surgery consult    Right lung granulomas  Previously seen on imaging.  Outpatient followup    DM2  A1c 6.6% 3/2025. on pta metformin  Insulin sliding scale  Hypoglycemia protocol    HTN  SVT s/p ablation  Paroxysmal afib  Has been having palpitations recently and was found to be having episodes of afib on a ziopatch. Not on anticoagulation due to the short length of her afib episodes. She attributed her symptoms to ozempic which has been discontinued and her palpitations have improved    Hx of uterine ca  S/p hysterectomy    Morbid obesity  Bmi of 63. She is at high risk for adverse medical outcomes    DVT ppx: scd  Code Status: full code  Medically Ready for Discharge: Anticipated in 2-4 Days    Medical Decision Making       80 MINUTES SPENT BY ME on the date of service doing chart review, history, exam, documentation & further activities per the note.      Mae Luz MD  Hospitalist Medicine Service  Pager# [phone]

## 2025-03-28 NOTE — PROGRESS NOTES
Not billing as admitted on same day      [facility]    Medicine Progress Note - Hospitalist Service    Date of Admission:  3/27/2025    Assessment & Plan       Donna Elmore is a 65 year old female with a past medical history of htn, svt s/p ablation, paroxysmal afib, morbid obesity, diabetes presents to the hospital with fevers and constipation and recently had diarrhea for which she takes Imodium and after that she became constipated and had a CT done at outside hospital which revealed stool and colitis and she was discharged on mag citrate and on 3/27 started having bowel movement but developed a fever of 100.5 and came to the ED and admitted on 3/28/2025      Acute sigmoid diverticulitis  Recent history of constipation  -Patient apparently recently had constipation for which she received mag citrate but started developing fevers of 101 few days back  -WBC count on admit elevated at 15 with hemoglobin of 12.9, lactic acid normal at 1, lipase of 33  -CT abdomen and pelvis 3/27-changes consistent with acute sigmoid diverticulitis, no mechanical obstruction, free gas and stool material and gas within the normal caliber redundant colon, diffusely fatty infiltrated enlarged liver without discrete lesion and partially with generalized nodular right lower lobe infiltrate and can be infectious or intermittent  -Chest x-ray revealed that nodularity in the right lower lobe corresponds to clusters of partial calcified granuloma as seen on prior exam  -In the ER patient was given IV fluids and was started on ceftriaxone and Flagyl and colorectal was consulted  -Seen by colorectal and they have started her on clear liquid diet and recommended to continue with antibiotics and monitor closely  -Continue with ceftriaxone, Flagyl and IV fluids  -As needed medications available for nausea and vomiting and as needed pain medications      Mild hyponatremia  -Sodium is 132 and can be due to dehydration  "and continue with IV fluids  -Repeat labs in the morning    X-ray finding of right lower lobe nodularity corresponding to partially calcified granuloma  -Noted and compared to prior    DM2  -A1c 6.6% 3/2025. on pta metformin  -Insulin sliding scale  -Hypoglycemia protocol     HTN  SVT s/p ablation  Paroxysmal afib  -Has been having palpitations recently and was found to be having episodes of afib on a ziopatch. Not on anticoagulation due to the short length of her afib episodes. She attributed her symptoms to ozempic which has been discontinued and her palpitations have improved.  -Continue with metoprolol succinate 100 mg daily  -Will order as needed hydralazine for now      Hx of uterine ca  -S/p hysterectomy     Morbid obesity  -Bmi of 63. She is at high risk for adverse medical outcomes            Diet: NPO for Medical/Clinical Reasons Except for: Ice Chips    DVT Prophylaxis: Pneumatic Compression Devices  Joiner Catheter: Not present  Lines: None     Cardiac Monitoring: None  Code Status:  Full code and will discuss with patient    Clinically Significant Risk Factors Present on Admission         # Hyponatremia: Lowest Na = 132 mmol/L in last 2 days, will monitor as appropriate   # Hypocalcemia: Lowest Ca = 8.6 mg/dL in last 2 days, will monitor and replace as appropriate         # Hypertension: Noted on problem list          # DMII: A1C = 6.6 % (Ref range: 4.0 - 6.0 %) within past 6 months    # Severe Obesity: Estimated body mass index is 69.17 kg/m  as calculated from the following:    Height as of 3/13/25: 1.613 m (5' 3.5\").    Weight as of 3/13/25: 179.9 kg (396 lb 11.2 oz).              Social Drivers of Health            Disposition Plan     Medically Ready for Discharge: Anticipated in 2-4 Days, depending on clinical response and improvement in electrolytes             Emily Giordano MD  Hospitalist Service  [facility]  Securely message with [email or web address] (more info)  Text page via [employer]ets[employer] " Paging/Directory     This note was completed in part using dictation via the Dragon voice recognition software. Some word and grammatical errors may occur and must be interpreted in the appropriate clinical context. If there are any questions pertaining to this issue, please contact me for further clarification.    ______________________________________________________________________    Interval History     Seen this afternoon and discussed the results of the labs and CT scan and patient is feeling little better and did tell her that colorectal has ordered clear diet      Physical Exam   Vital Signs: Temp: 99  F (37.2  C) Temp src: Oral BP: (!) 189/86 Pulse: 82   Resp: 18 SpO2: 96 % O2 Device: None (Room air)    Weight: 0 lbs 0 oz        General: Patient appears comfortable and in no acute distress.  HEENT: Head is atraumatic, normocephalic.  Pupils are equal, round and reactive to light.  No scleral icterus. Oral mucosa is moist   Neck: Neck is supple and No Lymphadenopathy   Respiratory: Lungs are clear to auscultation bilaterally with no wheeze or crackles   Cardiovascular: Regular rate , S1 and S2 normal with no murmer or rubs or gallops  Abdomen:   soft , non tender , non distended and bowel sound present   Skin: No skin rashes or lesions to inspection or palpation.  Neurologic: Higher functions are within normal limits. No obvious defects in speech, language and memory. No facial droop  Musculoskeletal: Normal Range of motion over upper and lower extremities bilaterally   Psychiatric: cooperative      Medical Decision Making             Data     I have personally reviewed the following data over the past 24 hrs:    15.0 (H)  \   12.9   / N/A     132 (L) 99 18.9 /  162 (H)   4.3 23 0.75 \     ALT: 24 AST: 20 AP: 99 TBILI: 0.6   ALB: 3.6 TOT PROTEIN: 6.7 LIPASE: 33     Procal: N/A CRP: N/A Lactic Acid: 1.0         Imaging results reviewed over the past 24 hrs:   Recent Results (from the past 24 hours)   CT  Abdomen Pelvis w Contrast    Narrative    EXAM: CT ABDOMEN PELVIS W CONTRAST  LOCATION: [facility]  DATE: 3/28/2025    INDICATION: Abdominal pain, constipation, fever.  COMPARISON: CT abdomen and pelvis with IV contrast 9/27/2013.  TECHNIQUE: CT scan of the abdomen and pelvis was performed following injection of IV contrast. Multiplanar reformats were obtained. Dose reduction techniques were used.  CONTRAST: 135 mL [id number].    FINDINGS:   LOWER CHEST: Interval development of nodular right lower lobe infiltrate (images 1-6, series 4), likely representing an infectious or an inflammatory process. No infiltrate on the left. No pleural effusion on either side. Normal cardiac size. No   pericardial effusion.    HEPATOBILIARY: Diffusely fatty infiltrated enlarged liver, length of the right lobe measures approximately 22 cm (image 69, series 5), unchanged. No discrete hepatic lesion. Patent hepatic and portal veins. Cholelithiasis, without biliary dilatation or   adjacent inflammation, interval enlargement of gallstones demonstrated previously.    PANCREAS: Normal.    SPLEEN: Normal.    ADRENAL GLANDS: Normal.    KIDNEYS/BLADDER: No urinary tract calculi. Both kidneys are well perfused without hydronephrosis or hydroureter. Partially distended urinary bladder.    BOWEL: Interval appendectomy. New onset mild soft tissue stranding adjacent to the sigmoid colon representing mild changes of acute sigmoid diverticulitis (images 162-188, series 4, images 63-84, series 6, images 40-67, series 5). No mechanical   obstruction, perforation, abscess or fistula formation. Formed stool material and scattered gas within normal-caliber redundant colon, more apparent in the rectosigmoid.    LYMPH NODES: No suspicious abdominopelvic adenopathy. Shotty subcentimeter retroperitoneal nodes adjacent to the aorta.    VASCULATURE: Normal-caliber abdominal aorta and the IVC.    PELVIC ORGANS: Interval development of  acute sigmoid diverticulitis. Hysterectomy. No adenopathy or free fluid.    MUSCULOSKELETAL: Small fat-containing ventral umbilical hernia. Degenerative spine and joints of the pelvis. Mild left convex thoracolumbar curve.      Impression    IMPRESSION:   1.  Interval development of mild changes of acute sigmoid diverticulitis, without secondary complications. Formed stool material and gas within normal-caliber redundant colon, more apparent in the rectosigmoid. No mechanical obstruction or free gas.   Interval appendectomy.    2.  Diffusely fatty infiltrated enlarged liver without discrete lesion, unchanged. Cholelithiasis without biliary dilatation or adjacent inflammation, interval enlargement of gallstones previously demonstrated.    3.  Partially visualized nodular right lower lobe infiltrate, likely representing an infectious or an inflammatory process.   XR Chest Port 1 View    Narrative    EXAM: XR CHEST PORT 1 VIEW  LOCATION: [facility]  DATE: 3/28/2025    INDICATION: possible infiltrate on CT scan of abdomen  COMPARISON: CT abdomen/pelvis from same date. CT chest and chest radiographs from 4/27/2017.      Impression    IMPRESSION: Heart size at upper limits normal. Left lung is clear. Nodular opacity in the right lower lobe corresponding to clusters of partially calcified granulomas as seen on the exam from 4/27/2017. Left lung is clear. No pneumothorax.

## 2025-03-28 NOTE — ED TRIAGE NOTES
Patient coming in with constipation.  Last BM was 10 days ago until today.  Pt called clinic and started taking miralax and Doculax on Monday.  Was taking it 3 times a day with no relief.  Went to urgent care yesterday and had CT done.  Tried enema yesterday with no relief.  Took milk of magnesia and magnesium citrate to add on to the meds.  Today had a small amount of BM.  Pt started getting a fever tonight and clinic recommended she come in for concerns of colitis.  Took ibuprofen around 2000.

## 2025-03-28 NOTE — CONSULTS
[facility]  Colon and Rectal Surgery Consult Note  Name: Donna Elmore    MRN: [id number]  YOB: 1960    Age: 65 year old  Date of admission: 3/27/2025  Primary care provider: Dennis Narayanan     Requesting Physician:  Dr. Luz   Reason for consult:  Diverticulitis           History of Present Illness:   Donna Elmore is a 65 year old female with a PMH of HTN, SVT s/p ablation, paroxysmal A-fib not on anticoagulation, obesity, DM, seen at the request of Dr. Luz , who presents with constipation and fevers. The patient reports that she began a new diet that was high in vegetables and fiber about 2 weeks ago.  This prompted frequent, loose stools so she took some Imodium.  This made her extremely constipated.  She began taking MiraLAX daily but still did not have a bowel movement after 7 days.  She her PCP and was advised to increase MiraLAX and begin taking Dulcolax.  This still did not result in a bowel movement.  2 days ago, she went to an outside ED where CT scan was notable for moderate stool burden and wall thickening of the sigmoid, suspicious for colitis.  Leukocytosis to 12.3.  She was discharged with magnesium citrate and milk of magnesia.  With this, she had a couple small bowel movements later that evening.  Throughout this time, she has been having lower abdominal cramping/discomfort but no acute pain.  She also endorses intermittent nausea but no vomiting.  Yesterday, she developed a fever of 100.5 at home and was advised to present to the ED.      In the ED, she was hypertensive but afebrile.  Abdominal exam was benign but notable tenderness in the left quadrants.  Labs demonstrated WBC 15, glucose 162, otherwise unremarkable.  Lactic acid was normal.  CT abdomen/pelvis with contrast demonstrated interval development of mild changes of acute sigmoid diverticulitis without evidence of free air or abscess.  She was admitted to the hospitalist for IV  "antibiotics and further management.  CRS was thus consulted.    Today, the patient reports that her overall symptoms are unchanged.  She continues to endorse lower abdominal discomfort.  Her nausea is somewhat worse but attributes this to the antibiotics and not eating.  She did have another bowel movement in the waiting room in the ED last night.  She has never had anything like this before and denies a history of diverticulitis.  She also denies family history of colorectal cancer or IBD.  At home, she typically has a regular, daily bowel movement and does not usually struggle with constipation.      Colonoscopy History: Patient reports last colonoscopy was more than 10 years ago, the prep well so has been hesitant to repeat ever since.    Past abdominal surgery: laparoscopic appendectomy 2013, hysterectomy 2010            Past Medical History:     Past Medical History:   Diagnosis Date    Abnormal glucose 2/1/17    \"mild DM\" per 2/1/17 lab note-Dr Cornell    Appendicitis 9/2015    Depression     Hepatitis A     HTN (hypertension)     Obese     Osteoarthritis of left knee     Plantar fasciitis     Shingles     SVT (supraventricular tachycardia) 8/15/2012    1/20/2010 SVT ablation    Uterine cancer (H) 3/2010    + washings    Venous dermatitis              Past Surgical History:     Past Surgical History:   Procedure Laterality Date    ------------OTHER-------------  7-2011    cyst removal - back    CATHETER, ABLATION  1-2010    tachycardia ablation    D & C  3-2010    HYSTERECTOMY RADICAL  3-2010    LAPAROSCOPIC APPENDECTOMY  9/27/2013    Procedure: LAPAROSCOPIC APPENDECTOMY;  LAPAROSCOPIC APPENDECTOMY;  Surgeon: Noreen Brambila MD;  Location:  OR               Social History:     Social History     Tobacco Use    Smoking status: Never    Smokeless tobacco: Never   Substance Use Topics    Alcohol use: No             Family History:     Family History   Problem Relation Age of Onset    Heart Disease Mother         " pacemaker, heart failure    Thyroid Disease Mother     Hypertension Mother     Cancer Father         kidney    Heart Disease Father     Hypertension Father     Thyroid Disease Sister              Allergies:     Allergies   Allergen Reactions    Percocet [Oxycodone-Acetaminophen]              Medications:     Current Facility-Administered Medications   Medication Dose Route Frequency Provider Last Rate Last Admin    [START ON 3/29/2025] cefTRIAXone (ROCEPHIN) 2 g vial to attach to  ml bag for ADULTS or NS 50 ml bag for PEDS  2 g Intravenous Q24H Mae Luz MD        insulin aspart (NovoLOG) injection (RAPID ACTING)  1-4 Units Subcutaneous Q4H Mae Luz MD        metroNIDAZOLE (FLAGYL) infusion 500 mg  500 mg Intravenous Q12H Mae Luz MD                 Review of Systems:   A comprehensive greater than 10 system review of systems was carried out.  Pertinent positives and negatives are noted above.  Otherwise negative for contributory info.            Physical Exam:     Blood pressure (!) 189/86, pulse 82, temperature 99  F (37.2  C), temperature source Oral, resp. rate 18, SpO2 95%, not currently breastfeeding.  No intake or output data in the 24 hours ending 03/28/25 0818  Exam:  General - Awake alert and oriented, appears stated age  Pulm - Non-labored breathing with normal respiratory effort  CVS - reg rate and rhythm, no peripheral edema  Abd - soft, non-tender, mildly distended, obese.  No guarding, rigidity or peritoneal signs.   Neuro - CN II-XII grossly intact  Musculoskeletal - extremities with no clubbing, cyanosis or edema; able to ambulate  Psych - responsive, alert, cooperative; oriented x3; appropriate mood and affect  External/skin - inspection reveals no rashes, lesions or ulcers, normal coloring             Data Reviewed:     Recent Results (from the past 48 hours)   CT Abdomen Pelvis w Contrast    Narrative    PROCEDURE: CT ABDOMEN PELVIS W IV  CONTRAST    HISTORY:  Bowel obstruction suspected.    TECHNIQUE: CT imaging of the abdomen and pelvis utilizing 3 mm axial slices and  intravenous contrast. Coronal and sagittal reformats provided.    Contrast: 100 mL of Omnipaque 350 was administered intravenously. No immediate  complication.    CT DLP DOSE: 1827 (mGy.cm).    COMPARISON: Chest, 2 views 3/8/2016.    FINDINGS:    Imaged lower chest: There are a cluster of calcified pulmonary nodules in the  right lower lobe, which are indicative of healed granulomatous disease, and are  stable when compared with 3/8/2016. There is no basilar pneumothorax. Linear  atelectasis in the inferior segment of the lingula. Heart size is normal.    Liver: There is diffuse hepatic steatosis with hepatomegaly. The liver measures  a maximum of 22.7 cm in dimension. No focal liver lesion.  Biliary: Cholelithiasis. No right upper quadrant inflammatory changes. Normal  caliber biliary tree. No radiopaque common bile duct stone or mass.  Pancreas: There is no pancreatic mass, pancreatic duct dilation, or glandular  atrophy.  Spleen: Spleen size is normal. No focal splenic lesion.  Adrenals: No adrenal mass.    Kidneys: The nephrograms are symmetric. There is no solid renal mass. There is  no perinephric fluid. 2 mm stone in the lower pole, right kidney, image 65,  series 2.  Urinary tracts: There is no obstructive urolith. No hydroureter.  Urinary bladder: Urinary bladder/perivesical fat are normal.  Reproductive: Uterus appears absent. No adnexal mass.    Colon: The sigmoid colon demonstrates mural thickening. This finding suggest  colitis. There is minimal pericolonic edema. No diverticula. There is no  transition point, and no evidence for pneumatosis coli.  Small bowel: The small bowel is normal in caliber. There is no transition point.  There is no evidence for pneumatosis intestinalis.  Appendix: Not identified. Anastomotic sutures are seen potentially on image 99,  series 2,  suggestive of prior appendectomy.  Stomach: There is no gastric wall thickening or evidence for gastric outlet  obstruction.    Peritoneal cavity: No drainable fluid collection. No evidence for  pneumoperitoneum.  Lymph nodes: No inguinal or pelvic sidewall lymphadenopathy. The retroperitoneum  and gastrohepatic ligament are normal. Missael hepatis demonstrates nonenlarged  lymph nodes.  Vasculature: No abdominal aortic aneurysm. Patent visceral artery branches.  Portal vein, splenic vein, and SMV are patent.    Body wall: There is no abdominal wall mass or fluid collection. Small umbilical  hernia containing fat.  Osseous: There are no suspicious bone lesions. There is degenerative disc and  apophyseal joint disease. The vertebral body heights are maintained.    IMPRESSION:  1.  No evidence on CT for a small bowel or colonic obstruction.  2.  Moderate stool material in the colon, with a segment of wall thickening of  the sigmoid. Paucity of diverticula at this location. Infectious colitis is  suspected.  3.  There is no evidence for pneumatosis coli, and visceral artery branches are  widely patent.  4.  No drainable fluid collection or extraluminal gas.  5.  Cluster of pulmonary micronodules, calcified, right lower lobe, stable when  compared with 3/2016. These are considered statistically benign.    Please note that all CT scans at this facility use dose modulation, iterative  reconstruction, and/or weight-based dosing when appropriate to reduce radiation  dose to as low as reasonably achievable.      Electronically signed by Refugio Huynh MD   Report Date: 3/26/2025 3:04 PM   CT Abdomen Pelvis w Contrast    Narrative    EXAM: CT ABDOMEN PELVIS W CONTRAST  LOCATION: [facility]  DATE: 3/28/2025    INDICATION: Abdominal pain, constipation, fever.  COMPARISON: CT abdomen and pelvis with IV contrast 9/27/2013.  TECHNIQUE: CT scan of the abdomen and pelvis was performed following injection of IV  contrast. Multiplanar reformats were obtained. Dose reduction techniques were used.  CONTRAST: 135 mL Isovue-370.    FINDINGS:   LOWER CHEST: Interval development of nodular right lower lobe infiltrate (images 1-6, series 4), likely representing an infectious or an inflammatory process. No infiltrate on the left. No pleural effusion on either side. Normal cardiac size. No   pericardial effusion.    HEPATOBILIARY: Diffusely fatty infiltrated enlarged liver, length of the right lobe measures approximately 22 cm (image 69, series 5), unchanged. No discrete hepatic lesion. Patent hepatic and portal veins. Cholelithiasis, without biliary dilatation or   adjacent inflammation, interval enlargement of gallstones demonstrated previously.    PANCREAS: Normal.    SPLEEN: Normal.    ADRENAL GLANDS: Normal.    KIDNEYS/BLADDER: No urinary tract calculi. Both kidneys are well perfused without hydronephrosis or hydroureter. Partially distended urinary bladder.    BOWEL: Interval appendectomy. New onset mild soft tissue stranding adjacent to the sigmoid colon representing mild changes of acute sigmoid diverticulitis (images 162-188, series 4, images 63-84, series 6, images 40-67, series 5). No mechanical   obstruction, perforation, abscess or fistula formation. Formed stool material and scattered gas within normal-caliber redundant colon, more apparent in the rectosigmoid.    LYMPH NODES: No suspicious abdominopelvic adenopathy. Shotty subcentimeter retroperitoneal nodes adjacent to the aorta.    VASCULATURE: Normal-caliber abdominal aorta and the IVC.    PELVIC ORGANS: Interval development of acute sigmoid diverticulitis. Hysterectomy. No adenopathy or free fluid.    MUSCULOSKELETAL: Small fat-containing ventral umbilical hernia. Degenerative spine and joints of the pelvis. Mild left convex thoracolumbar curve.      Impression    IMPRESSION:   1.  Interval development of mild changes of acute sigmoid diverticulitis, without  secondary complications. Formed stool material and gas within normal-caliber redundant colon, more apparent in the rectosigmoid. No mechanical obstruction or free gas.   Interval appendectomy.    2.  Diffusely fatty infiltrated enlarged liver without discrete lesion, unchanged. Cholelithiasis without biliary dilatation or adjacent inflammation, interval enlargement of gallstones previously demonstrated.    3.  Partially visualized nodular right lower lobe infiltrate, likely representing an infectious or an inflammatory process.   XR Chest Port 1 View    Narrative    EXAM: XR CHEST PORT 1 VIEW  LOCATION: [facility]  DATE: 3/28/2025    INDICATION: possible infiltrate on CT scan of abdomen  COMPARISON: CT abdomen/pelvis from same date. CT chest and chest radiographs from 4/27/2017.      Impression    IMPRESSION: Heart size at upper limits normal. Left lung is clear. Nodular opacity in the right lower lobe corresponding to clusters of partially calcified granulomas as seen on the exam from 4/27/2017. Left lung is clear. No pneumothorax.       Recent Labs   Lab 03/28/25  0154   WBC 15.0*   HGB 12.9   HCT 40.5   MCV 80          Lab Results   Component Value Date     03/28/2025     01/04/2024     11/10/2022     12/09/2021     09/03/2021    Lab Results   Component Value Date    CHLORIDE 99 03/28/2025    CHLORIDE 104 01/04/2024    CHLORIDE 103 11/10/2022    CHLORIDE 98 12/09/2021    CHLORIDE 100 09/03/2021    Lab Results   Component Value Date    BUN 18.9 03/28/2025    BUN 23.7 01/04/2024    BUN 22 11/10/2022    BUN 30 11/10/2022    BUN 16 12/09/2021    BUN 16 12/09/2021    BUN 19 09/03/2021    BUN 26 09/03/2021      Lab Results   Component Value Date    POTASSIUM 4.3 03/28/2025    POTASSIUM 4.3 01/04/2024    POTASSIUM 4.8 11/10/2022    POTASSIUM 4.8 12/09/2021    POTASSIUM 4.4 09/03/2021    Lab Results   Component Value Date    CO2 23 03/28/2025    CO2 26 01/04/2024     CO2 28 08/13/2020    CO2 29 09/27/2013    CO2 30 01/20/2010    Lab Results   Component Value Date    CR 0.75 03/28/2025    CR 0.84 01/04/2024    CR 0.74 11/10/2022    CR 0.98 12/09/2021    CR 0.72 09/03/2021        Recent Labs   Lab 03/28/25  0101   LACT 1.0         Assessment and Plan:   Donna Elmore is a 65 year old female with a PMH of HTN, SVT s/p ablation, paroxysmal A-fib not on anticoagulation, obesity, DM  who presents with constipation and fevers.  She did not have a bowel movement for 10 days after taking Imodium for loose stools caused by a new high-fiber diet.  She had ongoing lower abdominal discomfort, nausea, and developed a fever of 100.5 yesterday which prompted ED presentation.  She was afebrile in the ED and had leukocytosis to 15.1.  No elevated lactic acid.  CT demonstrated stool burden throughout colon, most apparent in sigmoid, and acute, uncomplicated sigmoid diverticulitis without evidence of abscess, perforation, or free air.  Today, patient reports that she continues to have some nausea and lower abdominal discomfort.  She did have a bowel movement in the ED last night.    No acute surgical intervention.  Continue with conservative management at this point.  It is unclear whether the constipation precipitated the diverticulitis is a symptom of the latter.  This is due to her recent fever and elevated white count, continue with IV antibiotics, IV fluids, bowel rest, and serial abdominal exams.  Due to her constipation, we will order a dose of milk of magnesia and begin MiraLAX twice daily.  We discussed that although her current state does not warrant surgical intervention at this point, if her pain worsens, fever persists, or leukocytosis worsens, she may need surgery this admission.  That would entail an open sigmoid colectomy with creation of loop ileostomy or end colostomy.  This is temporary and would be able to be reversed in 6 to 12 months.  She will definitely need a  colonoscopy in 6 to 8 weeks following this acute episode.  As she did not tolerate the prep well, she may do well with prophylactic antinausea medications and/or an extended prep.  CRS will continue to follow.      Plan:  Admit to hospitalist  Surgery: No indication for urgent surgery at this time.  Diet: Clear liquids  Bowel meds: milk of magnesia once today. Miralax bid.  IV Fluids: Continue  Antibiotics: Agree, continue  Medications: Per medicine  I&O s:  strict I&O s   Labs:   - Reviewed: Yes  - Ordered: none   Imaging:   - Dr. Juárez and myself have personally viewed: CT abd/pelvis from 3/26 and 3/27  - Ordered:  none  Activity:  OOB, ambulate as able  DVT prophylaxis: SCD s  This plan has been discussed with Dr. Juárez    Patient specific identified risk factors considered as part of today s evaluation include: BMI > 63, T2DM, A-fib not on anticoagulation    Additional history obtained from patient, review of previous ED visit.  Time spent on consultation: 55 minutes, greater than 50% spent on counseling and/or coordination of care.      Bridgette Rudd PA-C  Physician Assistant    [facility]  [address] [facility] [address]  [place] [address]  T: [phone]  F: [phone]

## 2025-03-28 NOTE — PROGRESS NOTES
Shift Summary 0700-1930    Admitting Diagnosis: Diverticulitis [K57.92]   Vitals:Baseline  Pain:PRN  A&Ox4  Voiding:BR  Mobility:IND  Tele:NA  CMS:Intact  Lung Sounds:Clear  IV-NS 75l/hr infusing  GI:No BM during thi shift  Dressing:NA    Orders Placed This Encounter      Clear Liquid Diet       Other: LE dryness, edema and redness, ABD folds redness Miconazole powder is available.Pt has small open skin on inner right thigh WOC consulted

## 2025-03-28 NOTE — TELEPHONE ENCOUNTER
Patient reports ongoing abdominal pain with constipation for the past 9 days. States she has tried colace and Miralax without relief. Then presented yesterday to [facility] and had a CT done that showed that the sigmoid colon demonstrated mural thickening suggestive of colitis and mild leukocytosis. Was given an enema without relief. Sent home to instructions to start Milk of mag, mag citrate and continue miralax with plenty of fluids. Patient reports he has had two small bowel movements but now has a fever. Discussed recommendation for follow up in the ER for further evaluation given fever. Patient verbalized understanding and agreeable to plan. All questions answered.

## 2025-03-29 LAB
ANION GAP SERPL CALCULATED.3IONS-SCNC: 8 MMOL/L (ref 7–15)
BUN SERPL-MCNC: 15.4 MG/DL (ref 8–23)
CALCIUM SERPL-MCNC: 8.5 MG/DL (ref 8.8–10.4)
CHLORIDE SERPL-SCNC: 99 MMOL/L (ref 98–107)
CREAT SERPL-MCNC: 0.72 MG/DL (ref 0.51–0.95)
EGFRCR SERPLBLD CKD-EPI 2021: >90 ML/MIN/1.73M2
ERYTHROCYTE [DISTWIDTH] IN BLOOD BY AUTOMATED COUNT: 14.2 % (ref 10–15)
GLUCOSE BLDC GLUCOMTR-MCNC: 101 MG/DL (ref 70–99)
GLUCOSE BLDC GLUCOMTR-MCNC: 133 MG/DL (ref 70–99)
GLUCOSE SERPL-MCNC: 116 MG/DL (ref 70–99)
HCO3 SERPL-SCNC: 27 MMOL/L (ref 22–29)
HCT VFR BLD AUTO: 41.6 % (ref 35–47)
HGB BLD-MCNC: 13 G/DL (ref 11.7–15.7)
MCH RBC QN AUTO: 25.5 PG (ref 26.5–33)
MCHC RBC AUTO-ENTMCNC: 31.3 G/DL (ref 31.5–36.5)
MCV RBC AUTO: 82 FL (ref 78–100)
PLATELET # BLD AUTO: 241 10E3/UL (ref 150–450)
POTASSIUM SERPL-SCNC: 4.6 MMOL/L (ref 3.4–5.3)
RBC # BLD AUTO: 5.1 10E6/UL (ref 3.8–5.2)
SODIUM SERPL-SCNC: 134 MMOL/L (ref 135–145)
WBC # BLD AUTO: 11.1 10E3/UL (ref 4–11)

## 2025-03-29 PROCEDURE — 120N000001 HC R&B MED SURG/OB

## 2025-03-29 PROCEDURE — 85027 COMPLETE CBC AUTOMATED: CPT | Performed by: HOSPITALIST

## 2025-03-29 PROCEDURE — 80048 BASIC METABOLIC PNL TOTAL CA: CPT | Performed by: HOSPITALIST

## 2025-03-29 PROCEDURE — 36415 COLL VENOUS BLD VENIPUNCTURE: CPT | Performed by: HOSPITALIST

## 2025-03-29 PROCEDURE — 99231 SBSQ HOSP IP/OBS SF/LOW 25: CPT | Performed by: COLON & RECTAL SURGERY

## 2025-03-29 PROCEDURE — 258N000003 HC RX IP 258 OP 636: Performed by: HOSPITALIST

## 2025-03-29 PROCEDURE — 250N000013 HC RX MED GY IP 250 OP 250 PS 637: Performed by: HOSPITALIST

## 2025-03-29 PROCEDURE — 250N000011 HC RX IP 250 OP 636: Mod: JZ | Performed by: HOSPITALIST

## 2025-03-29 PROCEDURE — 99232 SBSQ HOSP IP/OBS MODERATE 35: CPT | Performed by: HOSPITALIST

## 2025-03-29 PROCEDURE — 250N000013 HC RX MED GY IP 250 OP 250 PS 637

## 2025-03-29 RX ADMIN — CEFTRIAXONE SODIUM 2 G: 2 INJECTION, POWDER, FOR SOLUTION INTRAMUSCULAR; INTRAVENOUS at 06:32

## 2025-03-29 RX ADMIN — SIMETHICONE 80 MG: 80 TABLET, CHEWABLE ORAL at 21:22

## 2025-03-29 RX ADMIN — METRONIDAZOLE 500 MG: 500 INJECTION, SOLUTION INTRAVENOUS at 05:24

## 2025-03-29 RX ADMIN — MICONAZOLE NITRATE: 2 POWDER TOPICAL at 09:42

## 2025-03-29 RX ADMIN — POLYETHYLENE GLYCOL 3350 17 G: 17 POWDER, FOR SOLUTION ORAL at 09:42

## 2025-03-29 RX ADMIN — METRONIDAZOLE 500 MG: 500 INJECTION, SOLUTION INTRAVENOUS at 17:41

## 2025-03-29 RX ADMIN — METOPROLOL SUCCINATE 100 MG: 100 TABLET, EXTENDED RELEASE ORAL at 09:42

## 2025-03-29 RX ADMIN — SIMETHICONE 80 MG: 80 TABLET, CHEWABLE ORAL at 12:18

## 2025-03-29 RX ADMIN — SODIUM CHLORIDE: 0.9 INJECTION, SOLUTION INTRAVENOUS at 02:45

## 2025-03-29 ASSESSMENT — ACTIVITIES OF DAILY LIVING (ADL)
ADLS_ACUITY_SCORE: 38
ADLS_ACUITY_SCORE: 38
ADLS_ACUITY_SCORE: 42
ADLS_ACUITY_SCORE: 42
ADLS_ACUITY_SCORE: 38
ADLS_ACUITY_SCORE: 43
ADLS_ACUITY_SCORE: 43
ADLS_ACUITY_SCORE: 42
ADLS_ACUITY_SCORE: 42
ADLS_ACUITY_SCORE: 38
ADLS_ACUITY_SCORE: 38
ADLS_ACUITY_SCORE: 43
ADLS_ACUITY_SCORE: 38
ADLS_ACUITY_SCORE: 42
ADLS_ACUITY_SCORE: 38
ADLS_ACUITY_SCORE: 42
ADLS_ACUITY_SCORE: 38

## 2025-03-29 NOTE — PROGRESS NOTES
Shift Summary 0700-1930     Admitting Diagnosis: Diverticulitis [K57.92]   Vitals:Baseline  Pain:PRN, per pt request PRN Simethicone given for abd discomfort   A&Ox4  Voiding:BR  Mobility:IND  Tele:NA  CMS:Intact  Lung Sounds:Clear  IV-SL intermittent abx  GI:Pt had loose bowel movements this morning.   Dressing:NA  Diet:Modcarb     Other: LE dryness, edema and redness, ABD folds redness Miconazole powder applied.Pt has small open skin on inner right thigh WOC consulted.  Discharge: Anticipated Tomorrow

## 2025-03-29 NOTE — PROGRESS NOTES
COLON & RECTAL SURGERY  PROGRESS NOTE    March 29, 2025    SUBJECTIVE: Having loose bowel movements this morning.  Abdominal pain and nausea have improved.  Did tolerate milk of magnesia last night.    OBJECTIVE:  Temp:  [98.7  F (37.1  C)-99.1  F (37.3  C)] 99.1  F (37.3  C)  Pulse:  [71-77] 71  Resp:  [18] 18  BP: (125-156)/(67-74) 156/72  SpO2:  [96 %-98 %] 96 %  No intake or output data in the 24 hours ending 03/29/25 0756    GENERAL:  Awake, alert, no acute distress  EXTREMITIES: Warm and well perfused, no edema   ABDOMEN:  Soft, non tender, non-distended. No guarding, rigidity, or peritoneal signs.       LABS:  Lab Results   Component Value Date    WBC 15.0 03/28/2025    WBC 6.1 12/19/2019    WBC 7.5 01/20/2010     Lab Results   Component Value Date    HGB 12.9 03/28/2025    HGB 14.1 02/01/2022     Lab Results   Component Value Date    HCT 40.5 03/28/2025    HCT 43.9 02/01/2022     Lab Results   Component Value Date    PLT  03/28/2025      Comment:      Platelets clumped. Platelet count not available.     12/19/2019     Last Basic Metabolic Panel:  Lab Results   Component Value Date     03/28/2025     11/10/2022      Lab Results   Component Value Date    POTASSIUM 4.3 03/28/2025    POTASSIUM 4.8 11/10/2022     Lab Results   Component Value Date    CHLORIDE 99 03/28/2025    CHLORIDE 103 11/10/2022     Lab Results   Component Value Date    ROBYN 8.6 03/28/2025    ROBYN 9.3 11/10/2022     Lab Results   Component Value Date    CO2 23 03/28/2025    CO2 28 08/13/2020     Lab Results   Component Value Date    BUN 18.9 03/28/2025    BUN 22 11/10/2022    BUN 30 11/10/2022     Lab Results   Component Value Date    CR 0.75 03/28/2025    CR 0.74 11/10/2022     Lab Results   Component Value Date     03/28/2025    GLC 92 11/10/2022       ASSESSMENT/PLAN: Donna Elmore is a 65 year old female who presented for evaluation of abdominal pain, constipation and fevers.  Her nausea has resolved, and she  "has had return of bowel function.    Okay to advance to diabetic diet as tolerated from a colorectal surgery standpoint.  Recommend a 1 week course of antibiotics for her acute diverticulitis.  Can transition to oral Augmentin if tolerates diet advancement.  Would recommend a colonoscopy in 6 to 8 weeks for luminal assessment.  We discussed continuing on a laxative regimen at discharge for 5 to 7 days, increasing water intake to 64 more ounces daily, and then eventually transitioning to a high-fiber diet by increasing dietary fiber intake by 5 g/day.  If she tolerates diet advancement, she is okay for discharge from a colorectal surgery standpoint.    Clinically Significant Risk Factors         # Hyponatremia: Lowest Na = 132 mmol/L in last 2 days, will monitor as appropriate   # Hypocalcemia: Lowest Ca = 8.6 mg/dL in last 2 days, will monitor and replace as appropriate         # Hypertension: Noted on problem list           # DMII: A1C = 6.6 % (Ref range: 4.0 - 6.0 %) within past 6 months, PRESENT ON ADMISSION  # Severe Obesity: Estimated body mass index is 69.17 kg/m  as calculated from the following:    Height as of 3/13/25: 1.613 m (5' 3.5\").    Weight as of 3/13/25: 179.9 kg (396 lb 11.2 oz)., PRESENT ON ADMISSION          Total time spent on today's visit is 27 minutes.    For questions/paging, please contact the CRS office at [phone].    Milagro Juárez MD  Colorectal Surgery    [facility]  [address] [address]  T: [phone]  F: [phone]      "

## 2025-03-29 NOTE — PLAN OF CARE
Goal Outcome Evaluation:    Shift Summary 1900-0700    Admitting Diagnosis: Diverticulitis [K57.92]   Vitals WNL ex BP  Pain 7/10. Taking IV dilaudid PRN. Last dose 2143  A&Ox4  Voiding: Continent  Mobility: Ax1/SBA w/ cane to BR  Tele: N/A  CMS Intact ex swelling & redness to BLE  Lung Sounds clear/diminished on RA   GI: Continent. 1x BM this shift.  Drain/Skin/Dressing: PIV infusing NS @ 75 ml/hr & intermittent IV abx. BLE redness, dryness, swelling. Abd folds, inner R thigh wound ALEX.     Orders Placed This Encounter      Mod consistent carb diet. BG checked, 2 AM BG was 101.        Plan: Colorectal surgery following.

## 2025-03-29 NOTE — PROGRESS NOTES
[facility]    Medicine Progress Note - Hospitalist Service    Date of Admission:  3/27/2025    Assessment & Plan       Donna Elmore is a 65 year old female with a past medical history of htn, svt s/p ablation, paroxysmal afib, morbid obesity, diabetes presents to the hospital with fevers and constipation and recently had diarrhea for which she takes Imodium and after that she became constipated and had a CT done at outside hospital which revealed stool and colitis and she was discharged on mag citrate and on 3/27 started having bowel movement but developed a fever of 100.5 and came to the ED and admitted on 3/28/2025      Acute sigmoid diverticulitis  Recent history of constipation  -Patient apparently recently had constipation for which she received mag citrate but started developing fevers of 101 few days back  -WBC count on admit elevated at 15 with hemoglobin of 12.9, lactic acid normal at 1, lipase of 33  -CT abdomen and pelvis 3/27-changes consistent with acute sigmoid diverticulitis, no mechanical obstruction, free gas and stool material and gas within the normal caliber redundant colon, diffusely fatty infiltrated enlarged liver without discrete lesion and partially with generalized nodular right lower lobe infiltrate and can be infectious or intermittent  -Chest x-ray revealed that nodularity in the right lower lobe corresponds to clusters of partial calcified granuloma as seen on prior exam  -In the ER patient was given IV fluids and was started on ceftriaxone and Flagyl and colorectal was consulted  -On exam patient has minimal tenderness and clinically has improved significantly  -As per colorectal advance diet to diabetic  -CRS recommend 1 week course of antibiotics for her acute diverticulitis and can transition to oral Augmentin if she tolerates the diet  -Will need colonoscopy in 6 to 8 weeks for luminal assessment  -Continue laxative regimen at discharge for 5 to 7  "days, increased water intake to 64 ounces and then transition to high-fiber diet by increasing dietary fiber intake by 5 g/day  -Continue with ceftriaxone and Flagyl  -As needed antiemetics and antinausea medicine available  -Will see how she does with today with plan for discharge  in am if stable and she aggress      Mild hyponatremia  -Sodium is 132 and can be due to dehydration and  was given IV fluids and will stop  -Repeat labs in the morning is pending    X-ray finding of right lower lobe nodularity corresponding to partially calcified granuloma  -Noted and compared to prior    DM2  -A1c 6.6% 3/2025. on pta metformin  -Insulin sliding scale  -Hypoglycemia protocol     HTN  SVT s/p ablation  Paroxysmal afib  -Has been having palpitations recently and was found to be having episodes of afib on a ziopatch. Not on anticoagulation due to the short length of her afib episodes. She attributed her symptoms to ozempic which has been discontinued and her palpitations have improved.  -Continue with PTA metoprolol succinate 100 mg daily     Hx of uterine ca  -S/p hysterectomy     Morbid obesity  -Bmi of 63. She is at high risk for adverse medical outcomes            Diet: Moderate Consistent Carb (60 g CHO per Meal) Diet    DVT Prophylaxis: Pneumatic Compression Devices, ambulate  Joiner Catheter: Not present  Lines: None     Cardiac Monitoring: None  Code Status: Full CodeFull code and will discuss with patient    Clinically Significant Risk Factors         # Hyponatremia: Lowest Na = 132 mmol/L in last 2 days, will monitor as appropriate   # Hypocalcemia: Lowest Ca = 8.6 mg/dL in last 2 days, will monitor and replace as appropriate         # Hypertension: Noted on problem list           # DMII: A1C = 6.6 % (Ref range: 4.0 - 6.0 %) within past 6 months, PRESENT ON ADMISSION  # Severe Obesity: Estimated body mass index is 69.17 kg/m  as calculated from the following:    Height as of 3/13/25: 1.613 m (5' 3.5\").    Weight " as of 3/13/25: 179.9 kg (396 lb 11.2 oz)., PRESENT ON ADMISSION            Social Drivers of Health            Disposition Plan     Medically Ready for Discharge: Anticipated Tomorrow, depending on clinical response and improvement in electrolytes and how she tolerates the diet             Emily Giordano MD  Hospitalist Service  [facility]  Securely message with [employer] (more info)  Text page via [employer] Paging/Directory     This note was completed in part using dictation via the Dragon voice recognition software. Some word and grammatical errors may occur and must be interpreted in the appropriate clinical context. If there are any questions pertaining to this issue, please contact me for further clarification.    ______________________________________________________________________    Interval History     -Reviewed events and patient is clinically feeling much better-and has had a bowel movement and nausea has improved significantly  -Denies any shortness of breath or chest discomfort  - want to see how she does with diet by am which is reasonable  -discussed x ray results with the patient   - d/w maria teresa finley  - pt will update her family      Physical Exam   Vital Signs: Temp: 99.2  F (37.3  C) Temp src: Oral BP: (!) 146/65 Pulse: 63   Resp: 16 SpO2: 96 % O2 Device: None (Room air)    Weight: 0 lbs 0 oz        General: aox3  Respiratory: ctla  Cardiovascular: Regular rate , S1 and S2 normal with no murmer or rubs or gallops  Abdomen:   soft , non tender , non distended and bowel sound present   Neurologic: No facial droop  Skin: chronic changes in both legs with no local signs of infection  Musculoskeletal: Normal Range of motion over upper and lower extremities bilaterally   Psychiatric: cooperative      Medical Decision Making         Time spent in care of patient is 45 minutes and I reviewed labs and discussed plan of care in detail with the patient and the nursing staff    Data          Imaging results reviewed over the past 24 hrs:   No results found for this or any previous visit (from the past 24 hours).

## 2025-03-30 LAB
ANION GAP SERPL CALCULATED.3IONS-SCNC: 9 MMOL/L (ref 7–15)
BUN SERPL-MCNC: 16.3 MG/DL (ref 8–23)
CALCIUM SERPL-MCNC: 8.7 MG/DL (ref 8.8–10.4)
CHLORIDE SERPL-SCNC: 100 MMOL/L (ref 98–107)
CREAT SERPL-MCNC: 0.78 MG/DL (ref 0.51–0.95)
EGFRCR SERPLBLD CKD-EPI 2021: 84 ML/MIN/1.73M2
ERYTHROCYTE [DISTWIDTH] IN BLOOD BY AUTOMATED COUNT: 14.1 % (ref 10–15)
GLUCOSE BLDC GLUCOMTR-MCNC: 119 MG/DL (ref 70–99)
GLUCOSE BLDC GLUCOMTR-MCNC: 123 MG/DL (ref 70–99)
GLUCOSE BLDC GLUCOMTR-MCNC: 128 MG/DL (ref 70–99)
GLUCOSE BLDC GLUCOMTR-MCNC: 135 MG/DL (ref 70–99)
GLUCOSE BLDC GLUCOMTR-MCNC: 141 MG/DL (ref 70–99)
GLUCOSE SERPL-MCNC: 153 MG/DL (ref 70–99)
HCO3 SERPL-SCNC: 24 MMOL/L (ref 22–29)
HCT VFR BLD AUTO: 40 % (ref 35–47)
HGB BLD-MCNC: 12.7 G/DL (ref 11.7–15.7)
MCH RBC QN AUTO: 25.6 PG (ref 26.5–33)
MCHC RBC AUTO-ENTMCNC: 31.8 G/DL (ref 31.5–36.5)
MCV RBC AUTO: 81 FL (ref 78–100)
PLATELET # BLD AUTO: 267 10E3/UL (ref 150–450)
POTASSIUM SERPL-SCNC: 4.4 MMOL/L (ref 3.4–5.3)
RBC # BLD AUTO: 4.97 10E6/UL (ref 3.8–5.2)
SODIUM SERPL-SCNC: 133 MMOL/L (ref 135–145)
WBC # BLD AUTO: 9.9 10E3/UL (ref 4–11)

## 2025-03-30 PROCEDURE — 80048 BASIC METABOLIC PNL TOTAL CA: CPT | Performed by: HOSPITALIST

## 2025-03-30 PROCEDURE — 250N000013 HC RX MED GY IP 250 OP 250 PS 637: Performed by: HOSPITALIST

## 2025-03-30 PROCEDURE — 250N000013 HC RX MED GY IP 250 OP 250 PS 637: Performed by: INTERNAL MEDICINE

## 2025-03-30 PROCEDURE — 120N000001 HC R&B MED SURG/OB

## 2025-03-30 PROCEDURE — 85027 COMPLETE CBC AUTOMATED: CPT | Performed by: HOSPITALIST

## 2025-03-30 PROCEDURE — 36415 COLL VENOUS BLD VENIPUNCTURE: CPT | Performed by: HOSPITALIST

## 2025-03-30 PROCEDURE — 250N000011 HC RX IP 250 OP 636: Mod: JZ | Performed by: HOSPITALIST

## 2025-03-30 PROCEDURE — 99232 SBSQ HOSP IP/OBS MODERATE 35: CPT | Performed by: HOSPITALIST

## 2025-03-30 RX ORDER — POLYETHYLENE GLYCOL 3350 17 G/17G
17 POWDER, FOR SOLUTION ORAL DAILY
Status: DISCONTINUED | OUTPATIENT
Start: 2025-03-31 | End: 2025-04-01

## 2025-03-30 RX ORDER — LOPERAMIDE HYDROCHLORIDE 2 MG/1
2 CAPSULE ORAL
Status: COMPLETED | OUTPATIENT
Start: 2025-03-30 | End: 2025-03-30

## 2025-03-30 RX ADMIN — HYDROMORPHONE HYDROCHLORIDE 0.4 MG: 0.2 INJECTION, SOLUTION INTRAMUSCULAR; INTRAVENOUS; SUBCUTANEOUS at 06:14

## 2025-03-30 RX ADMIN — METOPROLOL SUCCINATE 100 MG: 100 TABLET, EXTENDED RELEASE ORAL at 09:09

## 2025-03-30 RX ADMIN — LOPERAMIDE HYDROCHLORIDE 2 MG: 2 CAPSULE ORAL at 22:09

## 2025-03-30 RX ADMIN — CEFTRIAXONE SODIUM 2 G: 2 INJECTION, POWDER, FOR SOLUTION INTRAMUSCULAR; INTRAVENOUS at 09:11

## 2025-03-30 RX ADMIN — SIMETHICONE 80 MG: 80 TABLET, CHEWABLE ORAL at 06:14

## 2025-03-30 RX ADMIN — AMOXICILLIN AND CLAVULANATE POTASSIUM 1 TABLET: 875; 125 TABLET, FILM COATED ORAL at 19:10

## 2025-03-30 RX ADMIN — METRONIDAZOLE 500 MG: 500 INJECTION, SOLUTION INTRAVENOUS at 06:02

## 2025-03-30 RX ADMIN — INSULIN ASPART 1 UNITS: 100 INJECTION, SOLUTION INTRAVENOUS; SUBCUTANEOUS at 10:05

## 2025-03-30 RX ADMIN — MICONAZOLE NITRATE: 2 POWDER TOPICAL at 10:06

## 2025-03-30 ASSESSMENT — ACTIVITIES OF DAILY LIVING (ADL)
ADLS_ACUITY_SCORE: 43
ADLS_ACUITY_SCORE: 50
ADLS_ACUITY_SCORE: 43

## 2025-03-30 NOTE — PLAN OF CARE
Goal Outcome Evaluation:    Shift Summary 1900-0700     Admitting Diagnosis: Diverticulitis [K57.92]   Vitals WNL  Pain 7/10. Taking IV Dilaudid PRN. Last dose 0614  A&Ox4  Voiding: Continent  Mobility: Ind w/ cane to BR  Tele: N/A  CMS Intact ex edema to BLE  Lung Sounds clear/diminished on RA   GI: Continent. Had a couple of loose stools this shift.ABD & gas discomfort.  Drain/Skin/Dressing: PIV SL w/ intermittent IV abx. BLE redness, dryness, swelling. Abd folds, inner R thigh wound ALEX.      Orders Placed This Encounter      Mod consistent carb diet. BG checked      Plan: Colorectal surgery following.

## 2025-03-30 NOTE — PROGRESS NOTES
[facility]    Medicine Progress Note - Hospitalist Service    Date of Admission:  3/27/2025    Assessment & Plan       Donna Elmore is a 65 year old female with a past medical history of htn, svt s/p ablation, paroxysmal afib, morbid obesity, diabetes presents to the hospital with fevers and constipation and recently had diarrhea for which she takes Imodium and after that she became constipated and had a CT done at outside hospital which revealed stool and colitis and she was discharged on mag citrate and on 3/27 started having bowel movement but developed a fever of 100.5 and came to the ED and admitted on 3/28/2025      Acute sigmoid diverticulitis  Recent history of constipation  -Patient apparently recently had constipation for which she received mag citrate but started developing fevers of 101 few days back  -WBC count on admit elevated at 15 with hemoglobin of 12.9, lactic acid normal at 1, lipase of 33  -CT abdomen and pelvis 3/27-changes consistent with acute sigmoid diverticulitis, no mechanical obstruction, free gas and stool material and gas within the normal caliber redundant colon, diffusely fatty infiltrated enlarged liver without discrete lesion and partially with generalized nodular right lower lobe infiltrate and can be infectious or intermittent  -Chest x-ray revealed that nodularity in the right lower lobe corresponds to clusters of partial calcified granuloma as seen on prior exam  -In the ER patient was given IV fluids and was started on ceftriaxone and Flagyl and colorectal was consulted  -On exam patient has minimal tenderness and clinically has improved significantly  -As per colorectal advance diet to diabetic  -CRS recommend 1 week course of antibiotics for her acute diverticulitis and can transition to oral Augmentin if she tolerates the diet  -Will need colonoscopy in 6 to 8 weeks for luminal assessment  -Continue laxative regimen at discharge for 5 to 7  days, increased water intake to 64 ounces and then transition to high-fiber diet by increasing dietary fiber intake by 5 g/day  -Of note patient only take MiraLAX 1 time yesterday and is having significant diarrhea and can be due to MiraLAX also can be due to underlying diverticulitis and ceftriaxone and 3% of the patient can cause diarrhea and she had already received the morning dose  -Will switch to Augmentin twice daily in the evening  -Will change to MiraLAX once a day from tomorrow  -As needed antiemetics and antinausea medicine available  -Discussed with patient and she wants to wait until tomorrow as she is having significant diarrhea as she is fearful that she may not get to the bathroom at her house which is reasonable      Mild hyponatremia improving-  -Sodium on presentation was 132 and was given IV fluids and it has improved to 134 in a.m. labs pending    X-ray finding of right lower lobe nodularity corresponding to partially calcified granuloma  -Noted and compared to prior    DM2  -A1c 6.6% 3/2025. on pta metformin  -Insulin sliding scale  -Hypoglycemia protocol     HTN  SVT s/p ablation  Paroxysmal afib  -Has been having palpitations recently and was found to be having episodes of afib on a ziopatch. Not on anticoagulation due to the short length of her afib episodes. She attributed her symptoms to ozempic which has been discontinued and her palpitations have improved.  -Of note I did inquire with patient and she mentioned that she has spoken with her cardiologist few weeks back and they were going to put her on Coreg but lately patient took few doses of Coreg and then did not feel well and thought it was side effects and she has not taken Coreg.  As far as discharge is concerned patient wants to be on metoprolol for now and Coreg will be stopped and her MAR and she will discuss with her cardiologist at future visit  --Continue with PTA metoprolol succinate 100 mg daily     Hx of uterine ca  -S/p  "hysterectomy     Morbid obesity  -Bmi of 63. She is at high risk for adverse medical outcomes            Diet: Moderate Consistent Carb (60 g CHO per Meal) Diet  Diet    DVT Prophylaxis: Pneumatic Compression Devices, ambulate  Joiner Catheter: Not present  Lines: None     Cardiac Monitoring: None  Code Status: Full CodeFull code and will discuss with patient    Clinically Significant Risk Factors         # Hyponatremia: Lowest Na = 134 mmol/L in last 2 days, will monitor as appropriate           # Hypertension: Noted on problem list           # DMII: A1C = 6.6 % (Ref range: 4.0 - 6.0 %) within past 6 months, PRESENT ON ADMISSION  # Severe Obesity: Estimated body mass index is 69.17 kg/m  as calculated from the following:    Height as of 3/13/25: 1.613 m (5' 3.5\").    Weight as of 3/13/25: 179.9 kg (396 lb 11.2 oz)., PRESENT ON ADMISSION            Social Drivers of Health            Disposition Plan     Medically Ready for Discharge: Anticipated Tomorrow, depending on how her diarrhea is and labs are pending             Emily Giordano MD  Hospitalist Service  [facility]  Securely message with [employer] (more info)  Text page via [employer] Paging/Directory     This note was completed in part using dictation via the Dragon voice recognition software. Some word and grammatical errors may occur and must be interpreted in the appropriate clinical context. If there are any questions pertaining to this issue, please contact me for further clarification.    ______________________________________________________________________    Interval History     -Reviewed events and patient mentioned that she does not have any shortness of breath, chest discomfort but is having significant diarrhea without any presence of blood  -No abdominal discomfort but at times can feel some gas  -Denied any shortness of breath or chest discomfort  -She did tell me that she is little scared that given her diarrhea she may not be " able to reach her bathroom given the distance between her bedroom and the bathroom    Physical Exam   Vital Signs: Temp: 98.3  F (36.8  C) Temp src: Oral BP: 120/59 Pulse: 71   Resp: 17 SpO2: 98 % O2 Device: None (Room air)    Weight: 0 lbs 0 oz        General: aox3  Respiratory: ctla  Cardiovascular: Regular rate , S1 and S2 normal   Abdomen:   soft , non tender , non distended and bowel sound present   Neurologic: No facial droop  Skin: chronic changes in both legs with no local signs of infection  Musculoskeletal: Normal Range of motion over upper and lower extremities bilaterally   Psychiatric: cooperative      Medical Decision Making         Time spent in care of patient is 42 minutes and I reviewed labs and discussed plan of care in detail with the patient and the nursing staff    Data     I have personally reviewed the following data over the past 24 hrs:    11.1 (H)  \   13.0   / 241     134 (L) 99 15.4 /  135 (H)   4.6 27 0.72 \       Imaging results reviewed over the past 24 hrs:   No results found for this or any previous visit (from the past 24 hours).

## 2025-03-30 NOTE — PROGRESS NOTES
Shift Summary [id number]     Admitting Diagnosis: Diverticulitis [K57.92]   Vitals:Baseline, RA denies SOB  Pain:No PRN given during this shift  A&Ox4  Voiding:BR  Mobility:IND,uses cane for baseline  Tele:NA  CMS:Intact  Lung Sounds:Clear  IV-SL   GI:Pt had loose bowel movements this morning.   Dressing:NA  Diet:Modcarb     Other: LE dryness, edema and redness.Pt has small open skin on inner right thigh WOC consulted.ABD folds redness Miconazole powder applied.Pt was having loose BM.  depending on how her diarrhea resolves.perineal cream  provided.Discharge: Anticipated Tomorrow.

## 2025-03-31 LAB
ANION GAP SERPL CALCULATED.3IONS-SCNC: 11 MMOL/L (ref 7–15)
BUN SERPL-MCNC: 13.9 MG/DL (ref 8–23)
CALCIUM SERPL-MCNC: 9.2 MG/DL (ref 8.8–10.4)
CHLORIDE SERPL-SCNC: 101 MMOL/L (ref 98–107)
CREAT SERPL-MCNC: 0.74 MG/DL (ref 0.51–0.95)
EGFRCR SERPLBLD CKD-EPI 2021: 90 ML/MIN/1.73M2
ERYTHROCYTE [DISTWIDTH] IN BLOOD BY AUTOMATED COUNT: 14.1 % (ref 10–15)
GLUCOSE BLDC GLUCOMTR-MCNC: 127 MG/DL (ref 70–99)
GLUCOSE BLDC GLUCOMTR-MCNC: 142 MG/DL (ref 70–99)
GLUCOSE BLDC GLUCOMTR-MCNC: 150 MG/DL (ref 70–99)
GLUCOSE BLDC GLUCOMTR-MCNC: 160 MG/DL (ref 70–99)
GLUCOSE SERPL-MCNC: 168 MG/DL (ref 70–99)
HCO3 SERPL-SCNC: 24 MMOL/L (ref 22–29)
HCT VFR BLD AUTO: 43.3 % (ref 35–47)
HGB BLD-MCNC: 14 G/DL (ref 11.7–15.7)
MCH RBC QN AUTO: 25.7 PG (ref 26.5–33)
MCHC RBC AUTO-ENTMCNC: 32.3 G/DL (ref 31.5–36.5)
MCV RBC AUTO: 80 FL (ref 78–100)
PLATELET # BLD AUTO: 314 10E3/UL (ref 150–450)
POTASSIUM SERPL-SCNC: 4.4 MMOL/L (ref 3.4–5.3)
RBC # BLD AUTO: 5.44 10E6/UL (ref 3.8–5.2)
SODIUM SERPL-SCNC: 136 MMOL/L (ref 135–145)
WBC # BLD AUTO: 8.4 10E3/UL (ref 4–11)

## 2025-03-31 PROCEDURE — 99231 SBSQ HOSP IP/OBS SF/LOW 25: CPT | Performed by: PHYSICIAN ASSISTANT

## 2025-03-31 PROCEDURE — 99232 SBSQ HOSP IP/OBS MODERATE 35: CPT | Performed by: HOSPITALIST

## 2025-03-31 PROCEDURE — 82310 ASSAY OF CALCIUM: CPT | Performed by: HOSPITALIST

## 2025-03-31 PROCEDURE — 36415 COLL VENOUS BLD VENIPUNCTURE: CPT | Performed by: HOSPITALIST

## 2025-03-31 PROCEDURE — 250N000013 HC RX MED GY IP 250 OP 250 PS 637: Performed by: PHYSICIAN ASSISTANT

## 2025-03-31 PROCEDURE — 80048 BASIC METABOLIC PNL TOTAL CA: CPT | Performed by: HOSPITALIST

## 2025-03-31 PROCEDURE — 120N000001 HC R&B MED SURG/OB

## 2025-03-31 PROCEDURE — 250N000013 HC RX MED GY IP 250 OP 250 PS 637: Performed by: HOSPITALIST

## 2025-03-31 PROCEDURE — 85027 COMPLETE CBC AUTOMATED: CPT | Performed by: HOSPITALIST

## 2025-03-31 RX ORDER — ACETAMINOPHEN 325 MG/1
650 TABLET ORAL EVERY 6 HOURS PRN
Status: DISCONTINUED | OUTPATIENT
Start: 2025-03-31 | End: 2025-04-02 | Stop reason: HOSPADM

## 2025-03-31 RX ADMIN — AMOXICILLIN AND CLAVULANATE POTASSIUM 1 TABLET: 875; 125 TABLET, FILM COATED ORAL at 20:58

## 2025-03-31 RX ADMIN — MICONAZOLE NITRATE: 2 POWDER TOPICAL at 21:07

## 2025-03-31 RX ADMIN — INSULIN ASPART 1 UNITS: 100 INJECTION, SOLUTION INTRAVENOUS; SUBCUTANEOUS at 08:02

## 2025-03-31 RX ADMIN — INSULIN ASPART 1 UNITS: 100 INJECTION, SOLUTION INTRAVENOUS; SUBCUTANEOUS at 12:49

## 2025-03-31 RX ADMIN — MICONAZOLE NITRATE: 2 POWDER TOPICAL at 08:13

## 2025-03-31 RX ADMIN — ACETAMINOPHEN 650 MG: 325 TABLET ORAL at 22:47

## 2025-03-31 RX ADMIN — METOPROLOL SUCCINATE 100 MG: 100 TABLET, EXTENDED RELEASE ORAL at 08:03

## 2025-03-31 RX ADMIN — AMOXICILLIN AND CLAVULANATE POTASSIUM 1 TABLET: 875; 125 TABLET, FILM COATED ORAL at 09:50

## 2025-03-31 RX ADMIN — ACETAMINOPHEN 650 MG: 325 TABLET ORAL at 12:28

## 2025-03-31 ASSESSMENT — ACTIVITIES OF DAILY LIVING (ADL)
ADLS_ACUITY_SCORE: 50
ADLS_ACUITY_SCORE: 51
ADLS_ACUITY_SCORE: 50
ADLS_ACUITY_SCORE: 51
ADLS_ACUITY_SCORE: 50
ADLS_ACUITY_SCORE: 51
ADLS_ACUITY_SCORE: 51
ADLS_ACUITY_SCORE: 50
ADLS_ACUITY_SCORE: 51
ADLS_ACUITY_SCORE: 50
ADLS_ACUITY_SCORE: 51
ADLS_ACUITY_SCORE: 50
ADLS_ACUITY_SCORE: 50
ADLS_ACUITY_SCORE: 51
ADLS_ACUITY_SCORE: 51
ADLS_ACUITY_SCORE: 50

## 2025-03-31 NOTE — PLAN OF CARE
Goal Outcome Evaluation:    Shift Summary 1900-0700     Admitting Diagnosis: Diverticulitis [K57.92]   Vitals WNL  Pain: Denies  A&Ox4  Voiding: Continent  Mobility: Ind w/ cane to BR  Tele: N/A  CMS Intact ex edema to BLE  Lung Sounds clear/diminished on RA   GI: Continent. ABD discomfort. Loose stools managed with Imodium once  Drain/Skin/Dressing: PIV SL / ABD folds & coccyx redness. BLE dryness, swelling. Inner R thigh wound ALEX.      Orders Placed This Encounter      Mod consistent carb diet. BG checked      Plan: Colorectal surgery following. Pending discharge.

## 2025-03-31 NOTE — PROGRESS NOTES
[facility]    Medicine Progress Note - Hospitalist Service    Date of Admission:  3/27/2025    Assessment & Plan       Donna Elmore is a 65 year old female with a past medical history of htn, svt s/p ablation, paroxysmal afib, morbid obesity, diabetes presents to the hospital with fevers and constipation and recently had diarrhea for which she takes Imodium and after that she became constipated and had a CT done at outside hospital which revealed stool and colitis and she was discharged on mag citrate and on 3/27 started having bowel movement but developed a fever of 100.5 and came to the ED and admitted on 3/28/2025      Acute sigmoid diverticulitis  Recent history of constipation  Diarrhea  -Patient apparently recently had constipation for which she received mag citrate but started developing fevers of 101 few days back  -WBC count on admit elevated at 15 with hemoglobin of 12.9, lactic acid normal at 1, lipase of 33  -CT abdomen and pelvis 3/27-changes consistent with acute sigmoid diverticulitis, no mechanical obstruction, free gas and stool material and gas within the normal caliber redundant colon, diffusely fatty infiltrated enlarged liver without discrete lesion and partially with generalized nodular right lower lobe infiltrate and can be infectious or intermittent  -Chest x-ray revealed that nodularity in the right lower lobe corresponds to clusters of partial calcified granuloma as seen on prior exam  -In the ER patient was given IV fluids and was started on ceftriaxone and Flagyl and colorectal was consulted  -On exam patient has minimal tenderness and clinically has improved significantly  -As per colorectal advance diet to diabetic  -CRS recommend 1 week course of antibiotics for her acute diverticulitis and can transition to oral Augmentin if she tolerates the diet  -Will need colonoscopy in 6 to 8 weeks for luminal assessment  -Continue laxative regimen at discharge  for 5 to 7 days, increased water intake to 64 ounces and then transition to high-fiber diet by increasing dietary fiber intake by 5 g/day    -3/30-tolerated the diet but the patient developed significant diarrhea and she was on ceftriaxone and Flagyl and ceftriaxone can cause diarrhea and 3% of the patient's and MiraLAX was held and ceftriaxone was changed to only Augmentin    -3/31-still having significant nonbloody diarrhea and patient wanted antibiotic to be changed and reviewed her recent course and imaging with the colorectal surgery and as per them patient had significant stool burden and could have been that she is starting to clear out and they saw the patient and I also discussed with the patient and she is willing to continue with the Augmentin    -Less concern for C. difficile given that she has not been on any antibiotics in the last 90 days and not has been hospital  -Avoid Imodium as one of the complication of acute diverticulitis can be obstruction  -Will see how her diarrhea is by a.m.      Mild hyponatremia -resolved    X-ray finding of right lower lobe nodularity corresponding to partially calcified granuloma  -Noted and compared to prior    DM2  -A1c 6.6% 3/2025. on pta metformin  -Insulin sliding scale  -Hypoglycemia protocol     HTN  SVT s/p ablation  Paroxysmal afib  -Has been having palpitations recently and was found to be having episodes of afib on a ziopatch. Not on anticoagulation due to the short length of her afib episodes. She attributed her symptoms to ozempic which has been discontinued and her palpitations have improved.  -Of note I did inquire with patient and she mentioned that she has spoken with her cardiologist few weeks back and they were going to put her on Coreg but lately patient took few doses of Coreg and then did not feel well and thought it was side effects and she has not taken Coreg.  As far as discharge is concerned patient wants to be on metoprolol for now and Coreg  "will be stopped and her MAR and she will discuss with her cardiologist at future visit  --Continue with PTA metoprolol succinate 100 mg daily     Hx of uterine ca  -S/p hysterectomy     Morbid obesity  -Bmi of 63. She is at high risk for adverse medical outcomes            Diet: Moderate Consistent Carb (60 g CHO per Meal) Diet  Diet    DVT Prophylaxis: Pneumatic Compression Devices, ambulate  Joiner Catheter: Not present  Lines: None     Cardiac Monitoring: None  Code Status: Full CodeFull code and will discuss with patient    Clinically Significant Risk Factors         # Hyponatremia: Lowest Na = 133 mmol/L in last 2 days, will monitor as appropriate           # Hypertension: Noted on problem list           # DMII: A1C = 6.6 % (Ref range: 4.0 - 6.0 %) within past 6 months, PRESENT ON ADMISSION  # Severe Obesity: Estimated body mass index is 69.17 kg/m  as calculated from the following:    Height as of 3/13/25: 1.613 m (5' 3.5\").    Weight as of 3/13/25: 179.9 kg (396 lb 11.2 oz)., PRESENT ON ADMISSION            Social Drivers of Health            Disposition Plan     Medically Ready for Discharge: Anticipated Tomorrow, depending on how her diarrhea              Mantori Giordano MD  Hospitalist Service  [facility]  Securely message with [employer] (more info)  Text page via [employer] Paging/Directory     This note was completed in part using dictation via the Dragon voice recognition software. Some word and grammatical errors may occur and must be interpreted in the appropriate clinical context. If there are any questions pertaining to this issue, please contact me for further clarification.      I am off service from tomorrow morning and her care will be taken over by one of my colleagues from hospital medicine team  ______________________________________________________________________    Interval History     -Reviewed events and patient mentioned that after she took Augmentin she had significant " diarrhea which has been nonbloody  -No evidence of any fever or chill  -Abdominal pain is much better and almost negligible  -As needed Tylenol was ordered and her IV came out and discussed with nursing staff about importance of IV    Physical Exam   Vital Signs: Temp: 98.8  F (37.1  C) Temp src: Oral BP: (!) 161/81 Pulse: 65   Resp: 18 SpO2: 96 % O2 Device: None (Room air)    Weight: 0 lbs 0 oz        General: aox3  Respiratory: ctla  Cardiovascular: Regular rate , S1 and S2 normal   Abdomen:   soft , non tender , non distended and bowel sound present   Neurologic: No facial droop  Skin: chronic changes in both legs with no local signs of infection  Musculoskeletal: Normal Range of motion over upper and lower extremities bilaterally   Psychiatric: cooperative      Medical Decision Making         Time spent in care of patient is 45 minutes and I reviewed labs and discussed plan of care in detail with the patient and the nursing staff and also discussed with the colorectal team    Data     I have personally reviewed the following data over the past 24 hrs:    8.4  \   14.0   / 314     136 101 13.9 /  168 (H)   4.4 24 0.74 \       Imaging results reviewed over the past 24 hrs:   No results found for this or any previous visit (from the past 24 hours).

## 2025-03-31 NOTE — PROGRESS NOTES
[facility]  PROGRESS NOTE    March 31, 2025    SUBJECTIVE:  Asked to see patient again due to concerns for diarrhea and antibiotic side effects. She is feeling better this AM. Minimal abdominal pain. Had frequent diarrhea yesterday, got imodium x 1 last night and stopped augmentin, BM less loose today. Tolerating diet no n/v. AVSS.     OBJECTIVE:  Temp:  [98.6  F (37  C)-99  F (37.2  C)] 99  F (37.2  C)  Pulse:  [63-69] 69  Resp:  [18] 18  BP: (128-131)/(60-65) 131/65  SpO2:  [98 %-99 %] 98 %    Intake/Output Summary (Last 24 hours) at 3/31/2025 0953  Last data filed at 3/30/2025 1530  Gross per 24 hour   Intake 240 ml   Output --   Net 240 ml       GENERAL:  Awake, alert, no acute distress  HEAD: Normocephalic atraumatic  SCLERA: Anicteric  EXTREMITIES: Warm and well perfused  ABDOMEN:  Soft, minimally tender, non-distended. No guarding, rigidity, or peritoneal signs.     LABS:  Lab Results   Component Value Date    WBC 8.4 03/31/2025    WBC 6.1 12/19/2019    WBC 7.5 01/20/2010     Lab Results   Component Value Date    HGB 14.0 03/31/2025    HGB 14.1 02/01/2022     Lab Results   Component Value Date    HCT 43.3 03/31/2025    HCT 43.9 02/01/2022     Lab Results   Component Value Date     03/31/2025     12/19/2019     Last Basic Metabolic Panel:  Lab Results   Component Value Date     03/30/2025     11/10/2022      Lab Results   Component Value Date    POTASSIUM 4.4 03/30/2025    POTASSIUM 4.8 11/10/2022     Lab Results   Component Value Date    CHLORIDE 100 03/30/2025    CHLORIDE 103 11/10/2022     Lab Results   Component Value Date    ROBYN 8.7 03/30/2025    ROBYN 9.3 11/10/2022     Lab Results   Component Value Date    CO2 24 03/30/2025    CO2 28 08/13/2020     Lab Results   Component Value Date    BUN 16.3 03/30/2025    BUN 22 11/10/2022    BUN 30 11/10/2022     Lab Results   Component Value Date    CR 0.78 03/30/2025    CR 0.74 11/10/2022     Lab Results   Component Value Date      03/31/2025    GLC 92 11/10/2022       ASSESSMENT/PLAN: Donna Elmore is a 65 year old female who presented for evaluation of abdominal pain, constipation and fevers. CT showed mild uncomplicated diverticulitis and moderate stool burden. Her nausea has resolved, and she has had return of bowel function.     Reviewed today that diarrhea is likely due to a combination of the diverticulitis itself, stool burden clearing out after stool softeners/laxatives, liquid diet, and possible Abx side effects.     - Diabetic diet. Discussed gradually increasing fiber at home, keeping up with water intake.  - Patient is on board with trying augmentin again. If she tolerates better today can continue at discharge to complete 7 day total course. If not tolerating would try cipro/flagyl, which could also contribute to diarrhea.  - Hold bowel regimen for now given loose stools, can restart miralax as needed if feeling constipated again. Would avoid imodium.   - PRN pain meds  - Colonoscopy in 6-8 weeks, our office will call her to schedule  - Ok to discharge from our standpoint    Discussed with Dr. Giordano & Dr. Gamez.    For questions/paging, please contact the CRS office at [phone].    Lai Gomez PA-C  Physician Assistant    [facility]  [address] [address]  T: [phone]  F: [phone]

## 2025-03-31 NOTE — PLAN OF CARE
Goal Outcome Evaluation: Progressing    3/27/25 admit, constipation  3/31/25, 7 a to 3 p    Orientation: Oriented x4 , pleasant    Vitals/Tele: VSS on Ra, Tylenol for chronic back pain    IV Access/drains: VAT consulted, wants the IV access on upper arm    Diet: Mod carb, BG checks, tolerating    Mobility: Independent with cane in room    GI/: Continent, reported at elast 2 soft bowel movements today    Wound/Skin: Edema, BLE, Wound R thigh, raw on groin and perineum area, skin brrier independently applied by patient    Consults: Hospitalist following    Discharge Plan: To home possibly tomorrow      See Flow sheets for assessment

## 2025-03-31 NOTE — PROGRESS NOTES
IV team came to see patient but patient was in the bathroom. Will reconnect with IV team once patient is available or call float resource. Report provided to incoming RN.

## 2025-04-01 LAB
GLUCOSE BLDC GLUCOMTR-MCNC: 130 MG/DL (ref 70–99)
GLUCOSE BLDC GLUCOMTR-MCNC: 131 MG/DL (ref 70–99)
GLUCOSE BLDC GLUCOMTR-MCNC: 157 MG/DL (ref 70–99)
GLUCOSE BLDC GLUCOMTR-MCNC: 161 MG/DL (ref 70–99)
GLUCOSE BLDC GLUCOMTR-MCNC: 164 MG/DL (ref 70–99)

## 2025-04-01 PROCEDURE — 250N000013 HC RX MED GY IP 250 OP 250 PS 637: Performed by: PHYSICIAN ASSISTANT

## 2025-04-01 PROCEDURE — 99232 SBSQ HOSP IP/OBS MODERATE 35: CPT | Performed by: STUDENT IN AN ORGANIZED HEALTH CARE EDUCATION/TRAINING PROGRAM

## 2025-04-01 PROCEDURE — G0463 HOSPITAL OUTPT CLINIC VISIT: HCPCS

## 2025-04-01 PROCEDURE — 250N000013 HC RX MED GY IP 250 OP 250 PS 637: Performed by: HOSPITALIST

## 2025-04-01 PROCEDURE — 120N000001 HC R&B MED SURG/OB

## 2025-04-01 PROCEDURE — 250N000013 HC RX MED GY IP 250 OP 250 PS 637: Performed by: STUDENT IN AN ORGANIZED HEALTH CARE EDUCATION/TRAINING PROGRAM

## 2025-04-01 RX ORDER — LACTOBACILLUS RHAMNOSUS GG 10B CELL
1 CAPSULE ORAL 2 TIMES DAILY
Status: DISCONTINUED | OUTPATIENT
Start: 2025-04-01 | End: 2025-04-02 | Stop reason: HOSPADM

## 2025-04-01 RX ADMIN — Medication 1 CAPSULE: at 12:53

## 2025-04-01 RX ADMIN — AMOXICILLIN AND CLAVULANATE POTASSIUM 1 TABLET: 875; 125 TABLET, FILM COATED ORAL at 08:37

## 2025-04-01 RX ADMIN — ACETAMINOPHEN 650 MG: 325 TABLET ORAL at 21:57

## 2025-04-01 RX ADMIN — METOPROLOL SUCCINATE 100 MG: 100 TABLET, EXTENDED RELEASE ORAL at 08:37

## 2025-04-01 RX ADMIN — MICONAZOLE NITRATE: 2 POWDER TOPICAL at 21:59

## 2025-04-01 RX ADMIN — INSULIN ASPART 1 UNITS: 100 INJECTION, SOLUTION INTRAVENOUS; SUBCUTANEOUS at 12:53

## 2025-04-01 RX ADMIN — ACETAMINOPHEN 650 MG: 325 TABLET ORAL at 07:25

## 2025-04-01 RX ADMIN — INSULIN ASPART 1 UNITS: 100 INJECTION, SOLUTION INTRAVENOUS; SUBCUTANEOUS at 08:37

## 2025-04-01 RX ADMIN — AMOXICILLIN AND CLAVULANATE POTASSIUM 1 TABLET: 875; 125 TABLET, FILM COATED ORAL at 21:58

## 2025-04-01 RX ADMIN — Medication 1 CAPSULE: at 21:58

## 2025-04-01 ASSESSMENT — ACTIVITIES OF DAILY LIVING (ADL)
ADLS_ACUITY_SCORE: 51

## 2025-04-01 NOTE — PLAN OF CARE
Trauma/Ortho/Medical (Choose one) Medical    Diagnosis: Diverticulitis  Mental Status: A&O x4  Activity/dangle Independent with cane  Diet: Mod carb  Pain: Denies  Joiner/Voiding: Voiding in bathroom  Tele/Restraints/Iso: NA  02/LDA:RA/ No iv access  D/C Date:TBD  Other Info: Edema and redness on BLE . Skin barrier applied independently. BG checks.

## 2025-04-01 NOTE — PLAN OF CARE
Shift: 1098-3171    Cognition/Mentation/Neuro: Aox4, calm cooperative   VS: VSS on RA   Cardiac: No tele, denies chest pain   GI/: Continent, up to bathroom to void. 3 episodes of diarrhea this morning. Endorses some abdominal cramping.   Pulmonary: LS clear, denies cough/ SOB   Pain: Prn tylenol for mild back pain  Drains/Lines: SL PIV  Skin: Redness to groin folds   Activity: Independent  Diet: Mod carb  Discharge: Likely tomorrow.     Shift summary: Nutrition consult and probiotic added.

## 2025-04-01 NOTE — PLAN OF CARE
Goal Outcome Evaluation:       Shift Summary [id number]    Admitting Diagnosis: Diverticulitis [K57.92]   Vitals VSS on RA   Pain by scheduled and PRN tylenol for chronic back pain  A&Ox4  Voiding BR  Mobility Ind W/Cane  CMS Intact  GI No BM @ this shift.  Dressing Edema, BLE, Wound R thigh, raw on groin and perineum area, skin brrier independently applied by patient  IV SL, BG checks   Orders Placed This Encounter      Moderate Consistent Carb (60 g CHO per Meal) Diet      Diet     Plan: discharge maybe today.

## 2025-04-01 NOTE — CONSULTS
Care Management Initial Consult    General Information  Assessment completed with: Patient, VM-chart review,    Type of CM/SW Visit: Initial Assessment    Primary Care Provider verified and updated as needed: Yes   Readmission within the last 30 days: no previous admission in last 30 days      Reason for Consult: community resources (for private pay home care needs)  Advance Care Planning: Advance Care Planning Reviewed: no concerns identified          Communication Assessment  Patient's communication style: spoken language (English or Bilingual)    Hearing Difficulty or Deaf: yes   Wear Glasses or Blind: yes    Cognitive  Cognitive/Neuro/Behavioral: WDL                      Living Environment:   People in home: alone     Current living Arrangements: condominium      Able to return to prior arrangements: yes       Family/Social Support:  Care provided by: self  Provides care for: no one     Support system:            Description of Support System:           Current Resources:   Patient receiving home care services: No        Community Resources:    Equipment currently used at home:    Supplies currently used at home: Diabetic Supplies    Employment/Financial:  Employment Status:          Financial Concerns: none           Does the patient's insurance plan have a 3 day qualifying hospital stay waiver?  No    Lifestyle & Psychosocial Needs:  Social Drivers of Health     Food Insecurity: Low Risk  (2/20/2025)    Food Insecurity     Within the past 12 months, did you worry that your food would run out before you got money to buy more?: No     Within the past 12 months, did the food you bought just not last and you didn t have money to get more?: No   Depression: Not at risk (12/30/2024)    PHQ-2     PHQ-2 Score: 0   Housing Stability: Low Risk  (2/20/2025)    Housing Stability     Do you have housing? : Yes     Are you worried about losing your housing?: No   Tobacco Use: Low Risk  (3/13/2025)    Patient History      Smoking Tobacco Use: Never     Smokeless Tobacco Use: Never     Passive Exposure: Not on file   Financial Resource Strain: Low Risk  (2/20/2025)    Financial Resource Strain     Within the past 12 months, have you or your family members you live with been unable to get utilities (heat, electricity) when it was really needed?: No   Alcohol Use: Not on file   Transportation Needs: Low Risk  (2/20/2025)    Transportation Needs     Within the past 12 months, has lack of transportation kept you from medical appointments, getting your medicines, non-medical meetings or appointments, work, or from getting things that you need?: No   Physical Activity: Not on file   Interpersonal Safety: Not At Risk (3/26/2025)    Received from [facility] and [facility]     Do you feel UNSAFE in any of your personal relationships with your family members or any other acquaintances?: No   Stress: Not on file   Social Connections: Not on file   Health Literacy: Not on file       Functional Status:  Prior to admission patient needed assistance:              Mental Health Status:          Chemical Dependency Status:                Values/Beliefs:  Spiritual, Cultural Beliefs, Holiness Practices, Values that affect care:                 Discussed  Partnership in Safe Discharge Planning  document with patient/family: No    Additional Information:  Met patient at bedside; introduced self and explained role in discharge planning.  Consulted for her request for information for help at home post discharge.    Patient admitted with diarrhea and diverticulitis.      Patient lives independently in her condominium.  She is independent at baseline for ADL/IADL's.  She drove here and her car is in the handicapped parking area; security had assisted her in parking it there at admission.    She had concerns for needing help at home for household chores and personal assistance.  She was provided a list of private pay  home health care agencies and explained process.  She is not sure if she will be discharged today or tomorrow.  She was accepting of calling one of the agencies to inquire about services.    She also would like security to retrieve her coat from her vehicle. Writer called security and they can retrieve her coat and her vehicle at time of discharge.  Discharging nurse can call them for this assistance.  Bedside nurse updated and also added to sticky note.    Patient had no other concerns.    Next Steps: No further care management intervention anticipated at this time.  Please re-consult if further needs arise.  Care management signing off.        Anabel Rush RN  Inpatient Float Care Coordinator  [facility]  [email or web address]

## 2025-04-01 NOTE — CONSULTS
"[facility]  WOC Nurse Inpatient Assessment     Consulted for: Wound folds, right inner thigh     Summary: patient with POA right inner thigh injury and skin folds irritation, initial woc visit 4/1 noted purple intact skin to right inner thigh and pink intact groin folds without satellite lesions, woc team signing off at this time     WOC nurse follow-up plan: signing off    Patient History (according to provider note(s):      \"65 year old female with a past medical history of htn, svt s/p ablation, paroxysmal afib, morbid obesity, diabetes presents to the hospital with fevers and constipation and recently had diarrhea for which she takes Imodium and after that she became constipated and had a CT done at outside hospital which revealed stool and colitis and she was discharged on mag citrate and on 3/27 started having bowel movement but developed a fever of 100.5 and came to the ED and admitted on 3/28/2025 \"    Assessment:      Areas visualized during today's visit: Focused: and Skin folds      location: right posterior thigh     Last photo: 4/1  Wound due to: Trauma patient reports pinched area between toilet seat when at home prior to admission   Wound history/plan of care: open to air   Wound base:  Non-blanchable, Maroon, Purple, and Epidermis,      Palpation of the wound bed: normal      Drainage: none     Description of drainage: none     Measurements (length x width x depth, in cm):none     Tunneling: N/A     Undermining: N/A  Periwound skin: Intact      Color: normal and consistent with surrounding tissue      Temperature: normal   Odor: none  Pain: denies ,   Pain interventions prior to dressing change: N/A  Treatment goal: Maintain (prevention of deterioration)  STATUS: initial assessment  Supplies ordered: discussed with patient      location: groin    Last photo: 4/1  Wound due to: none   Wound history/plan of care: found with antifungal powder in use   Wound base: Epidermis,     "  Palpation of the wound bed: normal      Drainage: none     Description of drainage: none     Measurements (length x width x depth, in cm): none     Tunneling: N/A     Undermining: N/A  Periwound skin: Intact      Color: normal and consistent with surrounding tissue      Temperature: normal   Odor: none  Pain: denies , none  Pain interventions prior to dressing change: N/A  Treatment goal: Maintain (prevention of deterioration)  STATUS: initial assessment  Supplies ordered: discussed with patient        Treatment Plan:     03/28/25 1450  miconazole (MICATIN) 2 % powder  Topical,   2 TIMES DAILY            Orders: Reviewed    RECOMMEND PRIMARY TEAM ORDER: None, at this time  Education provided: plan of care  Discussed plan of care with: Patient and Nurse  Notify [facility] if wound(s) deteriorate.  Nursing to notify the Provider(s) and re-consult the [facility] Nurse if new skin concern.    DATA:     Current support surface: Standard  Standard gel mattress (Isoflex)  Containment of urine/stool: Continent of bladder and Continent of bowel  BMI: There is no height or weight on file to calculate BMI.   Active diet order: Orders Placed This Encounter      Moderate Consistent Carb (60 g CHO per Meal) Diet      Diet     Output: No intake/output data recorded.     Labs:   Recent Labs   Lab 03/31/25  0909 03/28/25  0154 03/28/25  0058   ALBUMIN  --   --  3.6   HGB 14.0   < >  --    WBC 8.4   < >  --     < > = values in this interval not displayed.     Pressure injury risk assessment:   Sensory Perception: 3-->slightly limited  Moisture: 3-->occasionally moist  Activity: 3-->walks occasionally  Mobility: 3-->slightly limited  Nutrition: 3-->adequate  Friction and Shear: 2-->potential problem  Obed Score: 17    [place] CWOCN   1st choice: Securely message with [employer] ([employer] [facility] [employer])   (2nd option: [facility] Office Phone [phone], messages checked periodically Mon-Fri 8a-4p)

## 2025-04-01 NOTE — PLAN OF CARE
Date/Time: 4/1/25 1530 - 1900  Diagnosis: Acute sigmoid diverticulitis  Mental Status: A&O x4  Activity/dangle: Independent in room, pt uses personal cane  Diet: Mod Carb diet  Pain: Denies pain  Joiner/Voiding: Voiding in the bathroom  Tele/Restraints/Iso: N/A  02/LDA: Room air. IV saline locked  D/C Date: TBD  Other Info: BG Check w/ insulin coverage, insulin not needed for dinner.

## 2025-04-01 NOTE — PROGRESS NOTES
[facility]    Medicine Progress Note - Hospitalist Service    Date of Admission:  3/27/2025    Assessment & Plan   Donna Elmore is a 65 year old female with a past medical history of htn, svt s/p ablation, paroxysmal afib, morbid obesity, diabetes presents to the hospital with fevers and constipation and recently had diarrhea for which she takes Imodium and after that she became constipated and had a CT done at outside hospital which revealed stool and colitis and she was discharged on mag citrate and on 3/27 started having bowel movement but developed a fever of 100.5 and came to the ED and admitted on 3/28/2025    Acute sigmoid diverticulitis  Recent history of constipation  Diarrhea  -Patient apparently recently had constipation for which she received mag citrate but started developing fevers of 101 few days back  -WBC count on admit elevated at 15 with hemoglobin of 12.9, lactic acid normal at 1, lipase of 33  -CT abdomen and pelvis 3/27-changes consistent with acute sigmoid diverticulitis, no mechanical obstruction, free gas and stool material and gas within the normal caliber redundant colon, diffusely fatty infiltrated enlarged liver without discrete lesion and partially with generalized nodular right lower lobe infiltrate and can be infectious or intermittent  -Chest x-ray revealed that nodularity in the right lower lobe corresponds to clusters of partial calcified granuloma as seen on prior exam  -In the ER patient was given IV fluids and was started on ceftriaxone and Flagyl and colorectal was consulted  -On exam patient has minimal tenderness and clinically has improved significantly  -As per colorectal advance diet to diabetic  -CRS recommend 1 week course of antibiotics for her acute diverticulitis and can transition to oral Augmentin if she tolerates the diet  -Will need colonoscopy in 6 to 8 weeks for luminal assessment  -Continue laxative regimen at discharge for 5 to  7 days, increased water intake to 64 ounces and then transition to high-fiber diet by increasing dietary fiber intake by 5 g/day    - diarrhea improving however 3 stools today and patient has a hard time keeping herself clean and managing at home. Will monitor another day in the hospital. Nutrition consult at patient request  - SW consulted to provide resources for help after discharge    Mild hyponatremia -resolved    X-ray finding of right lower lobe nodularity corresponding to partially calcified granuloma  -Noted and compared to prior    DM2  -A1c 6.6% 3/2025. on pta metformin  -Insulin sliding scale  -Hypoglycemia protocol     HTN  SVT s/p ablation  Paroxysmal afib  -Has been having palpitations recently and was found to be having episodes of afib on a ziopatch. Not on anticoagulation due to the short length of her afib episodes. She attributed her symptoms to ozempic which has been discontinued and her palpitations have improved.  -Of note I did inquire with patient and she mentioned that she has spoken with her cardiologist few weeks back and they were going to put her on Coreg but lately patient took few doses of Coreg and then did not feel well and thought it was side effects and she has not taken Coreg.  As far as discharge is concerned patient wants to be on metoprolol for now and Coreg will be stopped and her MAR and she will discuss with her cardiologist at future visit  --Continue with PTA metoprolol succinate 100 mg daily     Hx of uterine ca  -S/p hysterectomy     Morbid obesity  -Bmi of 63. She is at high risk for adverse medical outcomes            Diet: Moderate Consistent Carb (60 g CHO per Meal) Diet  Diet    DVT Prophylaxis: Pneumatic Compression Devices, ambulate  Joiner Catheter: Not present  Lines: None     Cardiac Monitoring: None  Code Status: Full CodeFull code and will discuss with patient    Clinically Significant Risk Factors         # Hyponatremia: Lowest Na = 133 mmol/L in last 2  "days, will monitor as appropriate           # Hypertension: Noted on problem list           # DMII: A1C = 6.6 % (Ref range: 4.0 - 6.0 %) within past 6 months   # Severe Obesity: Estimated body mass index is 69.17 kg/m  as calculated from the following:    Height as of 3/13/25: 1.613 m (5' 3.5\").    Weight as of 3/13/25: 179.9 kg (396 lb 11.2 oz).      # Financial/Environmental Concerns: none         Social Drivers of Health            Disposition Plan     Medically Ready for Discharge: Anticipated Tomorrow, depending on how her diarrhea              Lovely Carter, DO  Hospitalist Service  [employer]  Securely message with [employer] (more info)  Text page via [employer] Paging/Directory     ______________________________________________________________________    Interval History   Patient up in her chair. Overall she is much better than admission. NO diarrhea over the night but she had 1 stool already and an upset stomach thus far. She spoke about her recent health decline and difficulty at home managing herself. Discussed with KEMAR Nurse update later in the day with more stools    Physical Exam   Vital Signs: Temp: 97.5  F (36.4  C) Temp src: Oral BP: (!) 142/59 Pulse: 69   Resp: 18 SpO2: 96 % O2 Device: None (Room air)    Weight: 0 lbs 0 oz    General: aox3  Neurologic: moving all extremities spontaneously and on demand  Skin: chronic changes in both legs with no local signs of infection  Musculoskeletal: Normal Range of motion over upper and lower extremities bilaterally   Psychiatric: cooperative      Medical Decision Making         Time spent in care of patient is 49 minutes and I reviewed labs and discussed plan of care in detail with the patient and the nursing staff and also discussed with the colorectal team    Data         Imaging results reviewed over the past 24 hrs:   No results found for this or any previous visit (from the past 24 hours).    "

## 2025-04-02 VITALS
DIASTOLIC BLOOD PRESSURE: 75 MMHG | TEMPERATURE: 98.4 F | RESPIRATION RATE: 16 BRPM | SYSTOLIC BLOOD PRESSURE: 156 MMHG | HEART RATE: 66 BPM | OXYGEN SATURATION: 98 %

## 2025-04-02 LAB
GLUCOSE BLDC GLUCOMTR-MCNC: 152 MG/DL (ref 70–99)
GLUCOSE BLDC GLUCOMTR-MCNC: 166 MG/DL (ref 70–99)

## 2025-04-02 PROCEDURE — 99239 HOSP IP/OBS DSCHRG MGMT >30: CPT | Performed by: STUDENT IN AN ORGANIZED HEALTH CARE EDUCATION/TRAINING PROGRAM

## 2025-04-02 PROCEDURE — 250N000013 HC RX MED GY IP 250 OP 250 PS 637: Performed by: HOSPITALIST

## 2025-04-02 PROCEDURE — 250N000013 HC RX MED GY IP 250 OP 250 PS 637: Performed by: STUDENT IN AN ORGANIZED HEALTH CARE EDUCATION/TRAINING PROGRAM

## 2025-04-02 PROCEDURE — 250N000013 HC RX MED GY IP 250 OP 250 PS 637: Performed by: PHYSICIAN ASSISTANT

## 2025-04-02 RX ADMIN — Medication 1 CAPSULE: at 08:34

## 2025-04-02 RX ADMIN — METOPROLOL SUCCINATE 100 MG: 100 TABLET, EXTENDED RELEASE ORAL at 08:34

## 2025-04-02 RX ADMIN — INSULIN ASPART 1 UNITS: 100 INJECTION, SOLUTION INTRAVENOUS; SUBCUTANEOUS at 08:35

## 2025-04-02 RX ADMIN — ACETAMINOPHEN 650 MG: 325 TABLET ORAL at 06:39

## 2025-04-02 RX ADMIN — MICONAZOLE NITRATE: 2 POWDER TOPICAL at 08:33

## 2025-04-02 RX ADMIN — AMOXICILLIN AND CLAVULANATE POTASSIUM 1 TABLET: 875; 125 TABLET, FILM COATED ORAL at 08:34

## 2025-04-02 ASSESSMENT — ACTIVITIES OF DAILY LIVING (ADL)
ADLS_ACUITY_SCORE: 51
ADLS_ACUITY_SCORE: 44
ADLS_ACUITY_SCORE: 51
ADLS_ACUITY_SCORE: 51
ADLS_ACUITY_SCORE: 44
ADLS_ACUITY_SCORE: 51
ADLS_ACUITY_SCORE: 44

## 2025-04-02 NOTE — PLAN OF CARE
Goal Outcome Evaluation:       Shift Summary [id number]     Admitting Diagnosis: Diverticulitis [K57.92]   Vitals VSS on RA   Pain by scheduled and PRN tylenol for chronic back pain  A&Ox4  Voiding BR  Mobility Ind W/Cane  CMS Intact  GI No BM @ this shift.  Dressing Edema, BLE, Wound R thigh, raw on groin and perineum area, skin brrier independently applied by patient  IV SL, BG checks   Moderate Consistent Carb (60 g CHO per Meal) Diet     Plan: TBD.

## 2025-04-02 NOTE — PROGRESS NOTES
"DATE & TIME: 3/1/25-3/2/25 2182-4234    Cognitive Concerns/ Orientation : A&O x4   BEHAVIOR & AGGRESSION TOOL COLOR: Green  CIWA SCORE: NA   ABNL VS/O2: VSS on RA  MOBILITY: Independent in the room with cane  PAIN MANAGMENT: Denies pain  DIET: Mod carb diet, 60 gram, denies nausea  BOWEL/BLADDER: Continent of Bladder, wears pads for occasional incontinence of stool  DRAIN/DEVICES: PIV/saline lock  TELEMETRY RHYTHM: NA  SKIN: scattered bruises, Pt refuses writer to check thigh wound, Pt states \"WOC saw it today and states that it is healed.\" WOC has signed off.  D/C DATE: TBD  Pt had 1 soft stool this shift       "

## 2025-04-02 NOTE — PROGRESS NOTES
hift Summary [id number]     Admitting Diagnosis: Diverticulitis [K57.92]   Vitals:Baseline, RA denies SOB  Pain:No PRN given during this shift  A&Ox4  Voiding:BR  Mobility:IND,uses cane for baseline  Tele:NA  CMS:Intact  Lung Sounds:Clear  IV-SL   GI:per pt BM diarrhea is better morning.   Dressing:NA  Diet:Modcarb     Other: LE dryness, edema and redness.Pt has small open skin on inner right thigh WOC signed off.ABD folds redness Miconazole powder applied.Pt discharged home with all belongings.

## 2025-04-02 NOTE — DISCHARGE SUMMARY
"[facility]  Hospitalist Discharge Summary      Date of Admission:  3/27/2025  Date of Discharge:  4/2/2025  Discharging Provider: Lovely Carter DO  Discharge Service: Hospitalist Service    Discharge Diagnoses   See below    Clinically Significant Risk Factors     # DMII: A1C = 6.6 % (Ref range: 4.0 - 6.0 %) within past 6 months  # Severe Obesity: Estimated body mass index is 69.17 kg/m  as calculated from the following:    Height as of 3/13/25: 1.613 m (5' 3.5\").    Weight as of 3/13/25: 179.9 kg (396 lb 11.2 oz).       Follow-ups Needed After Discharge   Follow-up Appointments       Hospital Follow-up with Existing Primary Care Provider (PCP)      Please see details below         Schedule Primary Care visit within: 7 Days   Recommended labs and Imaging (to be ordered by Primary Care Provider): cbc and basic       Follow Up      Follow up with colorectal surgery or gastroenterology in 6 to 8 weeks for interval colonoscopy              Discharge Disposition   Discharged to home  Condition at discharge: Stable    Hospital Course   Donna Elmore is a 65 year old female with a past medical history of htn, svt s/p ablation, paroxysmal afib, morbid obesity, diabetes presents to the hospital with fevers and constipation and recently had diarrhea for which she takes Imodium and after that she became constipated and had a CT done at outside hospital which revealed stool and colitis and she was discharged on mag citrate and on 3/27 started having bowel movement but developed a fever of 100.5 and came to the ED and admitted on 3/28/2025. She was found to have acute diverticulitis and she improved with IV and oral antibiotics. Will discharge home to complete oral course of antibiotics     Acute sigmoid diverticulitis  Recent history of constipation  Diarrhea  -Patient apparently recently had constipation for which she received mag citrate but started developing fevers of 101 few days back  -WBC " count on admit elevated at 15 with hemoglobin of 12.9, lactic acid normal at 1, lipase of 33  -CT abdomen and pelvis 3/27-changes consistent with acute sigmoid diverticulitis, no mechanical obstruction, free gas and stool material and gas within the normal caliber redundant colon, diffusely fatty infiltrated enlarged liver without discrete lesion and partially with generalized nodular right lower lobe infiltrate and can be infectious or intermittent  -Chest x-ray revealed that nodularity in the right lower lobe corresponds to clusters of partial calcified granuloma as seen on prior exam  -In the ER patient was given IV fluids and was started on ceftriaxone and Flagyl and colorectal was consulted  -On exam patient has minimal tenderness and clinically has improved significantly  -As per colorectal advance diet to diabetic    -CRS recommend 1 week course of antibiotics for her acute diverticulitis and can transition to oral Augmentin if she tolerates the diet  -Will need colonoscopy in 6 to 8 weeks for luminal assessment    Mild hyponatremia -resolved     X-ray finding of right lower lobe nodularity corresponding to partially calcified granuloma  -Noted and compared to prior     DM2  -A1c 6.6% 3/2025. on pta metformin  -Insulin sliding scale  -Hypoglycemia protocol     HTN  SVT s/p ablation  Paroxysmal afib  -Has been having palpitations recently and was found to be having episodes of afib on a ziopatch. Not on anticoagulation due to the short length of her afib episodes. She attributed her symptoms to ozempic which has been discontinued and her palpitations have improved.  -Of note I did inquire with patient and she mentioned that she has spoken with her cardiologist few weeks back and they were going to put her on Coreg but lately patient took few doses of Coreg and then did not feel well and thought it was side effects and she has not taken Coreg.  As far as discharge is concerned patient wants to be on metoprolol  for now and Coreg will be stopped and her MAR and she will discuss with her cardiologist at future visit  --Continue with PTA metoprolol succinate 100 mg daily     Hx of uterine ca  -S/p hysterectomy     Morbid obesity  -Bmi of 63. She is at high risk for adverse medical outcomes    Consultations This Hospital Stay   COLORECTAL SURGERY IP CONSULT  WOUND OSTOMY CONTINENCE NURSE  IP CONSULT  WOUND OSTOMY CONTINENCE NURSE  IP CONSULT  NURSING TO CONSULT FOR VASCULAR ACCESS CARE IP CONSULT  CARE MANAGEMENT / SOCIAL WORK IP CONSULT  NUTRITION SERVICES ADULT IP CONSULT    Code Status   Prior    Time Spent on this Encounter   ILovely DO, personally saw the patient today and spent greater than 30 minutes discharging this patient.       Lovely Carter DO  [facility]  [address] [address]  Phone: [phone]  Fax: [phone]  ______________________________________________________________________    Physical Exam   Vital Signs: Temp: 98.4  F (36.9  C) Temp src: Oral BP: (!) 156/75 Pulse: 66   Resp: 16 SpO2: 98 % O2 Device: None (Room air)    Weight: 0 lbs 0 oz         Primary Care Physician   Dennis Narayanan    Discharge Orders      Primary Care - Care Coordination Referral      Reason for your hospital stay    diverticlutis     Activity    Your activity upon discharge: activity as tolerated     Discharge Instructions    If you start to develop fever or chills or worsening abdominal pain or start developing nausea and vomiting then call 911 and go to the nearest ER     Follow Up    Follow up with colorectal surgery or gastroenterology in 6 to 8 weeks for interval colonoscopy     Diet    Follow this diet upon discharge: Current Diet:Orders Placed This Encounter      Moderate Consistent Carb (60 g CHO per Meal) Diet     Hospital Follow-up with Existing Primary Care Provider (PCP)    Please see details below            Significant Results and Procedures    Results for orders placed or performed during the hospital encounter of 03/27/25   CT Abdomen Pelvis w Contrast    Narrative    EXAM: CT ABDOMEN PELVIS W CONTRAST  LOCATION: [facility]  DATE: 3/28/2025    INDICATION: Abdominal pain, constipation, fever.  COMPARISON: CT abdomen and pelvis with IV contrast 9/27/2013.  TECHNIQUE: CT scan of the abdomen and pelvis was performed following injection of IV contrast. Multiplanar reformats were obtained. Dose reduction techniques were used.  CONTRAST: 135 mL Isovue-370.    FINDINGS:   LOWER CHEST: Interval development of nodular right lower lobe infiltrate (images 1-6, series 4), likely representing an infectious or an inflammatory process. No infiltrate on the left. No pleural effusion on either side. Normal cardiac size. No   pericardial effusion.    HEPATOBILIARY: Diffusely fatty infiltrated enlarged liver, length of the right lobe measures approximately 22 cm (image 69, series 5), unchanged. No discrete hepatic lesion. Patent hepatic and portal veins. Cholelithiasis, without biliary dilatation or   adjacent inflammation, interval enlargement of gallstones demonstrated previously.    PANCREAS: Normal.    SPLEEN: Normal.    ADRENAL GLANDS: Normal.    KIDNEYS/BLADDER: No urinary tract calculi. Both kidneys are well perfused without hydronephrosis or hydroureter. Partially distended urinary bladder.    BOWEL: Interval appendectomy. New onset mild soft tissue stranding adjacent to the sigmoid colon representing mild changes of acute sigmoid diverticulitis (images 162-188, series 4, images 63-84, series 6, images 40-67, series 5). No mechanical   obstruction, perforation, abscess or fistula formation. Formed stool material and scattered gas within normal-caliber redundant colon, more apparent in the rectosigmoid.    LYMPH NODES: No suspicious abdominopelvic adenopathy. Shotty subcentimeter retroperitoneal nodes adjacent to the aorta.    VASCULATURE:  Normal-caliber abdominal aorta and the IVC.    PELVIC ORGANS: Interval development of acute sigmoid diverticulitis. Hysterectomy. No adenopathy or free fluid.    MUSCULOSKELETAL: Small fat-containing ventral umbilical hernia. Degenerative spine and joints of the pelvis. Mild left convex thoracolumbar curve.      Impression    IMPRESSION:   1.  Interval development of mild changes of acute sigmoid diverticulitis, without secondary complications. Formed stool material and gas within normal-caliber redundant colon, more apparent in the rectosigmoid. No mechanical obstruction or free gas.   Interval appendectomy.    2.  Diffusely fatty infiltrated enlarged liver without discrete lesion, unchanged. Cholelithiasis without biliary dilatation or adjacent inflammation, interval enlargement of gallstones previously demonstrated.    3.  Partially visualized nodular right lower lobe infiltrate, likely representing an infectious or an inflammatory process.   XR Chest Port 1 View    Narrative    EXAM: XR CHEST PORT 1 VIEW  LOCATION: [facility]  DATE: 3/28/2025    INDICATION: possible infiltrate on CT scan of abdomen  COMPARISON: CT abdomen/pelvis from same date. CT chest and chest radiographs from 4/27/2017.      Impression    IMPRESSION: Heart size at upper limits normal. Left lung is clear. Nodular opacity in the right lower lobe corresponding to clusters of partially calcified granulomas as seen on the exam from 4/27/2017. Left lung is clear. No pneumothorax.       Discharge Medications   Discharge Medication List as of 4/2/2025 11:32 AM        START taking these medications    Details   amoxicillin-clavulanate (AUGMENTIN) 875-125 MG tablet Take 1 tablet by mouth every 12 hours for 2 days., Disp-4 tablet, R-0, E-Prescribe           CONTINUE these medications which have NOT CHANGED    Details   ELDERBERRY PO Take 1 tablet by mouth daily, Historical      ipratropium (ATROVENT HFA) 17 MCG/ACT inhaler  Inhale 2 puffs into the lungs every 6 hours as needed for wheezing. PRN, Disp-12.9 g, R-1, E-Prescribe      magnesium 250 MG tablet Take 1 tablet by mouth at bedtime., Historical      metFORMIN (GLUCOPHAGE XR) 500 MG 24 hr tablet TAKE 1 TABLET(500 MG) BY MOUTH DAILY WITH DINNER, Disp-90 tablet, R-3, E-Prescribe**Patient requests 90 days supply**      metoprolol succinate ER (TOPROL XL) 100 MG 24 hr tablet Take 100 mg by mouth daily. Finishing Rx then will switch to carvedilol, Historical      Probiotic Product (PROBIOTIC DAILY PO) Take 1 tablet by mouth every evening., Historical      simethicone (MYLICON) 80 MG chewable tablet Take 80 mg by mouth every 6 hours as needed for flatulence or cramping., Historical      sodium chloride (OCEAN) 0.65 % nasal spray Spray 1 spray into both nostrils daily as needed for congestion., Historical      VITAMIN D, CHOLECALCIFEROL, PO Take 1,000 Units by mouth daily., Historical      Zinc 50 MG CAPS Take 1 tablet by mouth daily., Historical      blood glucose (NO BRAND SPECIFIED) lancets standard Use to test blood sugar 1 times daily or as directed.Disp-100 each, [id number] to substitute formulary alternative.      blood glucose (NO BRAND SPECIFIED) test strip Use to test blood sugar 1 times daily or as directed., Disp-100 strip, R-3, E-PrescribeOkay to substitute formulary alternative.      Blood Glucose Monitoring Suppl (BLOOD GLUCOSE MONITOR SYSTEM) w/Device KIT 1 kit daily, Disp-1 kit, R-0, E-PrescribeOkay to substitute formulary alternative.      Continuous Glucose Sensor (FREESTYLE MARTY 2 SENSOR) [facility] USE 1 DEVICE AND CHANGE EVERY 14 DAYS, Disp-6 each, R-3, E-Prescribe           STOP taking these medications       carvedilol (COREG) 25 MG tablet Comments:   Reason for Stopping:         ibuprofen (ADVIL/MOTRIN) 200 MG tablet Comments:   Reason for Stopping:             Allergies   Allergies   Allergen Reactions    Percocet [Oxycodone-Acetaminophen] Nausea and Vomiting

## 2025-04-09 NOTE — PROGRESS NOTES
Assessment & Plan     Diverticulitis  Patient presents for hospital follow up. Admitted from 3/27/25 to 4/2/25 with diverticulitis. CT abdomen and pelvis 3/27 with changes consistent with acute sigmoid diverticulitis. Given IV antibiotics then discharged with oral antibiotic course of Augmentin. Symptoms improved. Planning follow up with GI in 6-8 weeks to schedule a colonoscopy. Reviewed diet recommendations including adequate fiber and hydration. Discussed trial of fiber supplement or Miralax as needed. Red flags that warrant emergent evaluation discussed. Follow up as needed for new or worsening symptoms or if symptoms fail to improve. Patient agreeable to plan. All questions answered.   - Basic metabolic panel  - CBC with Diff/Plt ([facility])  - VENOUS COLLECTION  - Basic metabolic panel    Primary hypertension  BP slightly above goal today. Taking Metoprolol. Following with cardiology. Recommendations include monitoring blood pressures over the next 1-2 weeks and if blood pressure continues to be elevated over 140/90 recommend follow up for a blood pressure management visit. Discussed importance of a healthy weight, along with diet and exercise. Continue current medication regimen and treatment plan. Patient agreeable to plan. All questions answered.   - Basic metabolic panel  - CBC with Diff/Plt ([facility])  - VENOUS COLLECTION  - Basic metabolic panel          MED REC REQUIRED  Post Medication Reconciliation Status: discharge medications reconciled and changed, per note/orders    Work on weight loss  Regular exercise  See Patient Instructions    Return if symptoms worsen or fail to improve, for Follow up.    Ashok Thompson is a 65 year old, presenting for the following health issues:  Hospital F/U (Discharged on 4/2/25, dx with diverticulitis, started on Augmentin, feeling better)    History of Present Illness       Reason for visit:  Follow up after hospitalization   She is taking medications regularly.      Bowel  movements still somewhat loose. Not taking anything now. Thinking of daily dose of Miralax to get more regular. GI doctors. Working on getting colonoscopy scheduled.     Blood pressure tends to run high. Taking Metoprolol. Following with cardiology and switched to carvedilol but hasn't really started yet. Cardiology monitoring heart rhythm as well with possible a-fib.   BP Readings from Last 6 Encounters:   04/10/25 (!) 142/86   04/02/25 (!) 156/75   03/13/25 (!) 171/75   02/20/25 (!) 157/67   02/05/25 (!) 157/91   10/30/24 118/78        Hospital Follow-up Visit:    Hospital/Nursing Home/IP Rehab Facility: [facility]  Most Recent Admission Date: 3/27/2025   Most Recent Admission Diagnosis: Diverticulitis - K57.92     Most Recent Discharge Date: 4/2/2025   Most Recent Discharge Diagnosis: Diverticulitis - K57.92   Was the patient in the ICU or did the patient experience delirium during hospitalization?  No  Do you have any other stressors you would like to discuss with your provider? No    Problems taking medications regularly:  None  Medication changes since discharge:   START taking these medications     Details   amoxicillin-clavulanate (AUGMENTIN) 875-125 MG tablet Take 1 tablet by mouth every 12 hours for 2 days., Disp-4 tablet, R-0, E-Prescribe     Problems adhering to non-medication therapy:  None    Summary of hospitalization:  [facility] discharge summary reviewed  Diagnostic Tests/Treatments reviewed.  Follow up needed: GI for scheduling colonoscopy 6-8 weeks following  Other Healthcare Providers Involved in Patient s Care:         Specialist appointment - [facility]   Update since discharge: improved.       Plan of care communicated with patient           Review of Systems  Constitutional, HEENT, cardiovascular, pulmonary, GI, , musculoskeletal, neuro, skin, endocrine and psych systems are negative, except as otherwise noted.       Objective    BP (!) 142/86   Pulse 65   Wt (!) 175.4 kg (386 lb 9.6 oz)   SpO2 95%   BMI 67.41 kg/m    Body mass index is 67.41 kg/m .  Physical Exam   GENERAL: alert and no distress  RESP: lungs clear to auscultation - no rales, rhonchi or wheezes  CV: regular rate and rhythm, normal S1 S2, no S3 or S4, no murmur, click or rub, no peripheral edema  ABDOMEN: soft, nontender and bowel sounds normal  PSYCH: mentation appears normal, affect normal/bright    No results found for this or any previous visit (from the past 24 hours).        Signed Electronically by: RICHMOND Anderson CNP

## 2025-04-10 ENCOUNTER — OFFICE VISIT (OUTPATIENT)
Dept: FAMILY MEDICINE | Facility: CLINIC | Age: 65
End: 2025-04-10

## 2025-04-10 VITALS
OXYGEN SATURATION: 95 % | WEIGHT: 293 LBS | HEART RATE: 65 BPM | DIASTOLIC BLOOD PRESSURE: 86 MMHG | SYSTOLIC BLOOD PRESSURE: 142 MMHG | BODY MASS INDEX: 67.41 KG/M2

## 2025-04-10 DIAGNOSIS — K57.92 DIVERTICULITIS: Primary | ICD-10-CM

## 2025-04-10 DIAGNOSIS — I10 PRIMARY HYPERTENSION: ICD-10-CM

## 2025-04-10 LAB
% GRANULOCYTES: 73.2 % (ref 42.2–75.2)
ANION GAP SERPL CALCULATED.3IONS-SCNC: 12 MMOL/L (ref 7–15)
BUN SERPL-MCNC: 25.2 MG/DL (ref 8–23)
CALCIUM SERPL-MCNC: 9.4 MG/DL (ref 8.8–10.4)
CHLORIDE SERPL-SCNC: 102 MMOL/L (ref 98–107)
CREAT SERPL-MCNC: 0.74 MG/DL (ref 0.51–0.95)
EGFRCR SERPLBLD CKD-EPI 2021: 90 ML/MIN/1.73M2
FASTING STATUS PATIENT QL REPORTED: ABNORMAL
GLUCOSE SERPL-MCNC: 126 MG/DL (ref 70–99)
HCO3 SERPL-SCNC: 24 MMOL/L (ref 22–29)
HCT VFR BLD AUTO: 41.2 % (ref 35–46)
HEMOGLOBIN: 13.6 G/DL (ref 11.8–15.5)
LYMPHOCYTES NFR BLD AUTO: 20.3 % (ref 20.5–51.1)
MCH RBC QN AUTO: 25.8 PG (ref 27–31)
MCHC RBC AUTO-ENTMCNC: 33 G/DL (ref 33–37)
MCV RBC AUTO: 78.2 FL (ref 80–100)
MONOCYTES NFR BLD AUTO: 6.5 % (ref 1.7–9.3)
PLATELET # BLD AUTO: 253 K/UL (ref 140–450)
POTASSIUM SERPL-SCNC: 4.3 MMOL/L (ref 3.4–5.3)
RBC # BLD AUTO: 5.26 X10/CMM (ref 3.7–5.2)
SODIUM SERPL-SCNC: 138 MMOL/L (ref 135–145)
WBC # BLD AUTO: 6.6 X10/CMM (ref 3.8–11)

## 2025-04-10 PROCEDURE — 36415 COLL VENOUS BLD VENIPUNCTURE: CPT

## 2025-04-10 PROCEDURE — 85025 COMPLETE CBC W/AUTO DIFF WBC: CPT

## 2025-04-10 PROCEDURE — 80048 BASIC METABOLIC PNL TOTAL CA: CPT | Mod: ORL

## 2025-04-10 PROCEDURE — 99495 TRANSJ CARE MGMT MOD F2F 14D: CPT

## 2025-04-10 NOTE — PATIENT INSTRUCTIONS
- Aim for 1-2 soft (like toothpaste) bowel movements per day  - Drink plenty of water (aim for 1.5 L per day)  - Continue eating a high fiber diet  - Consider adding a daily over counter fiber supplement (such as Metamucil or Citrucel). Use one tablespoon in eight ounces of liquid by mouth twice a day. Adjust as needed to maintain 1-2 soft bowel movements per day.  - If you are not able to tolerate a fiber supplement use Miralax; 17 g (capful) in 8 oz water orally, twice daily. Adjust as needed to maintain 1-2 soft bowel movements per day.

## 2025-05-09 ENCOUNTER — MYC REFILL (OUTPATIENT)
Dept: FAMILY MEDICINE | Facility: CLINIC | Age: 65
End: 2025-05-09

## 2025-05-09 DIAGNOSIS — I10 PRIMARY HYPERTENSION: Primary | ICD-10-CM

## 2025-05-09 NOTE — TELEPHONE ENCOUNTER
Med: metoprolol    LOV (related): 04/10/2025    Last Lab: 04/10/2025      Due for F/U around: PRN if BP is elevated, due for CPX    Next Appt: None scheduled        BP Readings from Last 3 Encounters:   04/10/25 (!) 142/86   04/02/25 (!) 156/75   03/13/25 (!) 171/75       Last Comprehensive Metabolic Panel:  Lab Results   Component Value Date     04/10/2025    POTASSIUM 4.3 04/10/2025    CHLORIDE 102 04/10/2025    CO2 24 04/10/2025    ANIONGAP 12 04/10/2025     (H) 04/10/2025    BUN 25.2 (H) 04/10/2025    CR 0.74 04/10/2025    GFRESTIMATED 90 04/10/2025    ROBYN 9.4 04/10/2025

## 2025-05-11 RX ORDER — METOPROLOL SUCCINATE 100 MG/1
100 TABLET, EXTENDED RELEASE ORAL DAILY
Qty: 90 TABLET | Refills: 3 | Status: SHIPPED | OUTPATIENT
Start: 2025-05-11

## 2025-06-15 ENCOUNTER — HEALTH MAINTENANCE LETTER (OUTPATIENT)
Age: 65
End: 2025-06-15

## 2025-06-23 ENCOUNTER — MYC MEDICAL ADVICE (OUTPATIENT)
Dept: FAMILY MEDICINE | Facility: CLINIC | Age: 65
End: 2025-06-23

## 2025-06-23 DIAGNOSIS — B37.31 YEAST INFECTION OF THE VAGINA: Primary | ICD-10-CM

## 2025-06-23 RX ORDER — FLUCONAZOLE 150 MG/1
TABLET ORAL
Qty: 2 TABLET | Refills: 1 | Status: SHIPPED | OUTPATIENT
Start: 2025-06-23

## 2025-06-23 NOTE — TELEPHONE ENCOUNTER
See [employer] message requesting rx for yeast infection triggered by Augmentin x 7 days prescribed at 6/20 visit with Nicole Woody NP or dental infection.   Last related visit: 3/13/25 with Dr. Narayanan -  noted patient reporting having lot of yeast infections patient believed to be due to Jardiance.     Fluconazole fill history:   3/13/25 #6 tabs  3/11/25 #2  2/20/25 #2  1/14/25 #2    Plan: okay per Dr. Narayanan to send fluconazole 150mg #2 with 1 refill. Patient to follow up if symptoms fail to resolve.   Alicia Brennan RN